# Patient Record
Sex: FEMALE | Race: BLACK OR AFRICAN AMERICAN | Employment: UNEMPLOYED | URBAN - METROPOLITAN AREA
[De-identification: names, ages, dates, MRNs, and addresses within clinical notes are randomized per-mention and may not be internally consistent; named-entity substitution may affect disease eponyms.]

---

## 2017-05-17 ENCOUNTER — APPOINTMENT (OUTPATIENT)
Dept: GENERAL RADIOLOGY | Age: 52
End: 2017-05-17
Payer: MEDICARE

## 2017-05-17 ENCOUNTER — HOSPITAL ENCOUNTER (EMERGENCY)
Age: 52
Discharge: HOME | End: 2017-05-17
Attending: EMERGENCY MEDICINE
Payer: MEDICARE

## 2017-05-17 VITALS
TEMPERATURE: 98.3 F | BODY MASS INDEX: 33.86 KG/M2 | OXYGEN SATURATION: 100 % | HEIGHT: 62 IN | DIASTOLIC BLOOD PRESSURE: 94 MMHG | RESPIRATION RATE: 17 BRPM | HEART RATE: 100 BPM | SYSTOLIC BLOOD PRESSURE: 154 MMHG | WEIGHT: 184 LBS

## 2017-05-17 DIAGNOSIS — S62.366A CLOSED NONDISPLACED FRACTURE OF NECK OF FIFTH METACARPAL BONE OF RIGHT HAND, INITIAL ENCOUNTER: Primary | ICD-10-CM

## 2017-05-17 PROCEDURE — 99284 EMERGENCY DEPT VISIT MOD MDM: CPT

## 2017-05-17 PROCEDURE — 29125 APPL SHORT ARM SPLINT STATIC: CPT

## 2017-05-17 PROCEDURE — 6370000000 HC RX 637 (ALT 250 FOR IP): Performed by: EMERGENCY MEDICINE

## 2017-05-17 PROCEDURE — 96372 THER/PROPH/DIAG INJ SC/IM: CPT

## 2017-05-17 PROCEDURE — 73130 X-RAY EXAM OF HAND: CPT

## 2017-05-17 PROCEDURE — 6360000002 HC RX W HCPCS: Performed by: EMERGENCY MEDICINE

## 2017-05-17 RX ORDER — ACETAMINOPHEN 325 MG/1
650 TABLET ORAL ONCE
Status: DISCONTINUED | OUTPATIENT
Start: 2017-05-17 | End: 2017-05-17

## 2017-05-17 RX ORDER — ACETAMINOPHEN 500 MG
500 TABLET ORAL EVERY 6 HOURS PRN
Qty: 120 TABLET | Refills: 3 | Status: SHIPPED | OUTPATIENT
Start: 2017-05-17 | End: 2017-12-02 | Stop reason: ALTCHOICE

## 2017-05-17 RX ORDER — FENTANYL CITRATE 50 UG/ML
50 INJECTION, SOLUTION INTRAMUSCULAR; INTRAVENOUS ONCE
Status: COMPLETED | OUTPATIENT
Start: 2017-05-17 | End: 2017-05-17

## 2017-05-17 RX ORDER — TRAMADOL HYDROCHLORIDE 50 MG/1
50 TABLET ORAL EVERY 8 HOURS PRN
Qty: 20 TABLET | Refills: 0 | Status: SHIPPED | OUTPATIENT
Start: 2017-05-17 | End: 2017-05-22 | Stop reason: ALTCHOICE

## 2017-05-17 RX ORDER — TRAMADOL HYDROCHLORIDE 50 MG/1
50 TABLET ORAL ONCE
Status: COMPLETED | OUTPATIENT
Start: 2017-05-17 | End: 2017-05-17

## 2017-05-17 RX ADMIN — TRAMADOL HYDROCHLORIDE 50 MG: 50 TABLET, FILM COATED ORAL at 15:41

## 2017-05-17 RX ADMIN — FENTANYL CITRATE 50 MCG: 50 INJECTION, SOLUTION INTRAMUSCULAR; INTRAVENOUS at 16:42

## 2017-05-17 ASSESSMENT — ENCOUNTER SYMPTOMS
BLOOD IN STOOL: 0
CHEST TIGHTNESS: 0
EYE DISCHARGE: 0
CONSTIPATION: 0
EYE PAIN: 0
ABDOMINAL DISTENTION: 0
ABDOMINAL PAIN: 0
RHINORRHEA: 0
STRIDOR: 0
SORE THROAT: 0
DIARRHEA: 0
CHOKING: 0
VOICE CHANGE: 0
BACK PAIN: 0
COUGH: 0
NAUSEA: 0
TROUBLE SWALLOWING: 0
PHOTOPHOBIA: 0
VOMITING: 0
APNEA: 0
SHORTNESS OF BREATH: 0
WHEEZING: 0
COLOR CHANGE: 0
EYE REDNESS: 0

## 2017-05-17 ASSESSMENT — PAIN DESCRIPTION - ORIENTATION
ORIENTATION: RIGHT
ORIENTATION: RIGHT

## 2017-05-17 ASSESSMENT — PAIN SCALES - GENERAL
PAINLEVEL_OUTOF10: 8
PAINLEVEL_OUTOF10: 7
PAINLEVEL_OUTOF10: 8
PAINLEVEL_OUTOF10: 10

## 2017-05-17 ASSESSMENT — PAIN DESCRIPTION - PAIN TYPE
TYPE: ACUTE PAIN
TYPE: ACUTE PAIN

## 2017-05-17 ASSESSMENT — PAIN DESCRIPTION - LOCATION
LOCATION: HAND
LOCATION: HAND

## 2017-05-22 ENCOUNTER — OFFICE VISIT (OUTPATIENT)
Dept: ORTHOPEDIC SURGERY | Age: 52
End: 2017-05-22
Payer: MEDICARE

## 2017-05-22 VITALS — HEIGHT: 62 IN | WEIGHT: 188.49 LBS | BODY MASS INDEX: 34.69 KG/M2

## 2017-05-22 DIAGNOSIS — S62.339A FRACTURE OF METACARPAL NECK OF RIGHT HAND, CLOSED, INITIAL ENCOUNTER: Primary | ICD-10-CM

## 2017-05-22 PROCEDURE — 3017F COLORECTAL CA SCREEN DOC REV: CPT | Performed by: ORTHOPAEDIC SURGERY

## 2017-05-22 PROCEDURE — G8417 CALC BMI ABV UP PARAM F/U: HCPCS | Performed by: ORTHOPAEDIC SURGERY

## 2017-05-22 PROCEDURE — 99203 OFFICE O/P NEW LOW 30 MIN: CPT | Performed by: ORTHOPAEDIC SURGERY

## 2017-05-22 PROCEDURE — 1036F TOBACCO NON-USER: CPT | Performed by: ORTHOPAEDIC SURGERY

## 2017-05-22 PROCEDURE — 3014F SCREEN MAMMO DOC REV: CPT | Performed by: ORTHOPAEDIC SURGERY

## 2017-05-22 PROCEDURE — G8427 DOCREV CUR MEDS BY ELIG CLIN: HCPCS | Performed by: ORTHOPAEDIC SURGERY

## 2017-05-22 RX ORDER — HYDROCODONE BITARTRATE AND ACETAMINOPHEN 5; 325 MG/1; MG/1
1 TABLET ORAL EVERY 8 HOURS PRN
Qty: 28 TABLET | Refills: 0 | Status: SHIPPED | OUTPATIENT
Start: 2017-05-22 | End: 2017-08-02 | Stop reason: ALTCHOICE

## 2017-05-22 ASSESSMENT — ENCOUNTER SYMPTOMS
ABDOMINAL PAIN: 0
WHEEZING: 0
SHORTNESS OF BREATH: 0
ABDOMINAL DISTENTION: 0

## 2017-05-31 ENCOUNTER — OFFICE VISIT (OUTPATIENT)
Dept: ORTHOPEDIC SURGERY | Age: 52
End: 2017-05-31
Payer: MEDICARE

## 2017-05-31 VITALS — WEIGHT: 182 LBS | BODY MASS INDEX: 33.49 KG/M2 | HEIGHT: 62 IN

## 2017-05-31 DIAGNOSIS — S62.339D: Primary | ICD-10-CM

## 2017-05-31 PROCEDURE — G8427 DOCREV CUR MEDS BY ELIG CLIN: HCPCS | Performed by: STUDENT IN AN ORGANIZED HEALTH CARE EDUCATION/TRAINING PROGRAM

## 2017-05-31 PROCEDURE — 3017F COLORECTAL CA SCREEN DOC REV: CPT | Performed by: STUDENT IN AN ORGANIZED HEALTH CARE EDUCATION/TRAINING PROGRAM

## 2017-05-31 PROCEDURE — 99213 OFFICE O/P EST LOW 20 MIN: CPT | Performed by: STUDENT IN AN ORGANIZED HEALTH CARE EDUCATION/TRAINING PROGRAM

## 2017-05-31 PROCEDURE — 1036F TOBACCO NON-USER: CPT | Performed by: STUDENT IN AN ORGANIZED HEALTH CARE EDUCATION/TRAINING PROGRAM

## 2017-05-31 PROCEDURE — G8417 CALC BMI ABV UP PARAM F/U: HCPCS | Performed by: STUDENT IN AN ORGANIZED HEALTH CARE EDUCATION/TRAINING PROGRAM

## 2017-05-31 PROCEDURE — 3014F SCREEN MAMMO DOC REV: CPT | Performed by: STUDENT IN AN ORGANIZED HEALTH CARE EDUCATION/TRAINING PROGRAM

## 2017-05-31 ASSESSMENT — ENCOUNTER SYMPTOMS
DIARRHEA: 0
NAUSEA: 0
CHOKING: 0
ABDOMINAL PAIN: 0
CHEST TIGHTNESS: 0
VOMITING: 0
WHEEZING: 0
EYE DISCHARGE: 0

## 2017-08-02 ENCOUNTER — APPOINTMENT (OUTPATIENT)
Dept: GENERAL RADIOLOGY | Age: 52
End: 2017-08-02
Payer: COMMERCIAL

## 2017-08-02 ENCOUNTER — HOSPITAL ENCOUNTER (EMERGENCY)
Age: 52
Discharge: HOME | End: 2017-08-02
Attending: EMERGENCY MEDICINE
Payer: COMMERCIAL

## 2017-08-02 VITALS
TEMPERATURE: 98.5 F | OXYGEN SATURATION: 100 % | RESPIRATION RATE: 16 BRPM | SYSTOLIC BLOOD PRESSURE: 136 MMHG | HEART RATE: 92 BPM | DIASTOLIC BLOOD PRESSURE: 93 MMHG

## 2017-08-02 DIAGNOSIS — S92.402A CLOSED NON-PHYSEAL FRACTURE OF PHALANX OF LEFT GREAT TOE, UNSPECIFIED PHALANX, INITIAL ENCOUNTER: Primary | ICD-10-CM

## 2017-08-02 PROCEDURE — 73630 X-RAY EXAM OF FOOT: CPT

## 2017-08-02 PROCEDURE — 6370000000 HC RX 637 (ALT 250 FOR IP): Performed by: PHYSICIAN ASSISTANT

## 2017-08-02 PROCEDURE — 99283 EMERGENCY DEPT VISIT LOW MDM: CPT

## 2017-08-02 RX ORDER — HYDROCODONE BITARTRATE AND ACETAMINOPHEN 5; 325 MG/1; MG/1
1 TABLET ORAL EVERY 8 HOURS PRN
Qty: 15 TABLET | Refills: 0 | Status: SHIPPED | OUTPATIENT
Start: 2017-08-02 | End: 2017-12-02 | Stop reason: ALTCHOICE

## 2017-08-02 RX ORDER — HYDROCODONE BITARTRATE AND ACETAMINOPHEN 5; 325 MG/1; MG/1
2 TABLET ORAL ONCE
Status: COMPLETED | OUTPATIENT
Start: 2017-08-02 | End: 2017-08-02

## 2017-08-02 RX ADMIN — HYDROCODONE BITARTRATE AND ACETAMINOPHEN 2 TABLET: 5; 325 TABLET ORAL at 14:51

## 2017-08-02 ASSESSMENT — ENCOUNTER SYMPTOMS
RESPIRATORY NEGATIVE: 1
EYES NEGATIVE: 1
ALLERGIC/IMMUNOLOGIC NEGATIVE: 1
GASTROINTESTINAL NEGATIVE: 1

## 2017-08-02 ASSESSMENT — PAIN DESCRIPTION - LOCATION: LOCATION: TOE (COMMENT WHICH ONE)

## 2017-08-02 ASSESSMENT — PAIN DESCRIPTION - PAIN TYPE: TYPE: ACUTE PAIN

## 2017-08-02 ASSESSMENT — PAIN SCALES - GENERAL
PAINLEVEL_OUTOF10: 10
PAINLEVEL_OUTOF10: 10

## 2017-08-02 ASSESSMENT — PAIN DESCRIPTION - FREQUENCY: FREQUENCY: CONTINUOUS

## 2017-08-02 ASSESSMENT — PAIN DESCRIPTION - ORIENTATION: ORIENTATION: LEFT

## 2017-08-02 ASSESSMENT — PAIN DESCRIPTION - DESCRIPTORS: DESCRIPTORS: ACHING

## 2017-12-02 ENCOUNTER — APPOINTMENT (OUTPATIENT)
Dept: GENERAL RADIOLOGY | Age: 52
End: 2017-12-02
Payer: COMMERCIAL

## 2017-12-02 ENCOUNTER — HOSPITAL ENCOUNTER (EMERGENCY)
Age: 52
Discharge: HOME | End: 2017-12-02
Attending: EMERGENCY MEDICINE
Payer: COMMERCIAL

## 2017-12-02 VITALS
OXYGEN SATURATION: 98 % | BODY MASS INDEX: 35.7 KG/M2 | WEIGHT: 194 LBS | TEMPERATURE: 97.3 F | HEIGHT: 62 IN | DIASTOLIC BLOOD PRESSURE: 84 MMHG | SYSTOLIC BLOOD PRESSURE: 137 MMHG | HEART RATE: 92 BPM | RESPIRATION RATE: 18 BRPM

## 2017-12-02 DIAGNOSIS — M79.671 RIGHT FOOT PAIN: ICD-10-CM

## 2017-12-02 DIAGNOSIS — K08.89 PAIN, DENTAL: Primary | ICD-10-CM

## 2017-12-02 DIAGNOSIS — R03.0 ELEVATED BLOOD PRESSURE READING: ICD-10-CM

## 2017-12-02 DIAGNOSIS — R82.998 FOAMY URINE: ICD-10-CM

## 2017-12-02 LAB
BILIRUBIN URINE: NEGATIVE
COLOR: YELLOW
COMMENT UA: NORMAL
GLUCOSE URINE: NEGATIVE
KETONES, URINE: NEGATIVE
LEUKOCYTE ESTERASE, URINE: NEGATIVE
NITRITE, URINE: NEGATIVE
PH UA: 7 (ref 5–8)
PROTEIN UA: NEGATIVE
SPECIFIC GRAVITY UA: 1.01 (ref 1–1.03)
TURBIDITY: CLEAR
URINE HGB: NEGATIVE
UROBILINOGEN, URINE: NORMAL

## 2017-12-02 PROCEDURE — 73630 X-RAY EXAM OF FOOT: CPT

## 2017-12-02 PROCEDURE — 81003 URINALYSIS AUTO W/O SCOPE: CPT

## 2017-12-02 PROCEDURE — 6370000000 HC RX 637 (ALT 250 FOR IP): Performed by: PHYSICIAN ASSISTANT

## 2017-12-02 PROCEDURE — 99283 EMERGENCY DEPT VISIT LOW MDM: CPT

## 2017-12-02 RX ORDER — PENICILLIN V POTASSIUM 250 MG/1
500 TABLET ORAL ONCE
Status: COMPLETED | OUTPATIENT
Start: 2017-12-02 | End: 2017-12-02

## 2017-12-02 RX ORDER — ACETAMINOPHEN 325 MG/1
650 TABLET ORAL EVERY 6 HOURS PRN
Qty: 30 TABLET | Refills: 0 | Status: SHIPPED | OUTPATIENT
Start: 2017-12-02 | End: 2018-02-07 | Stop reason: SDUPTHER

## 2017-12-02 RX ORDER — ACETAMINOPHEN 325 MG/1
650 TABLET ORAL ONCE
Status: COMPLETED | OUTPATIENT
Start: 2017-12-02 | End: 2017-12-02

## 2017-12-02 RX ORDER — PENICILLIN V POTASSIUM 500 MG/1
500 TABLET ORAL 4 TIMES DAILY
Qty: 28 TABLET | Refills: 0 | Status: SHIPPED | OUTPATIENT
Start: 2017-12-02 | End: 2017-12-09

## 2017-12-02 RX ADMIN — PENICILLIN V POTASSIUM 500 MG: 250 TABLET ORAL at 15:35

## 2017-12-02 RX ADMIN — ACETAMINOPHEN 650 MG: 325 TABLET ORAL at 15:35

## 2017-12-02 ASSESSMENT — PAIN DESCRIPTION - PAIN TYPE: TYPE: ACUTE PAIN

## 2017-12-02 ASSESSMENT — PAIN SCALES - GENERAL: PAINLEVEL_OUTOF10: 10

## 2017-12-02 ASSESSMENT — ENCOUNTER SYMPTOMS
BACK PAIN: 0
EYE ITCHING: 0
SORE THROAT: 0
COUGH: 0
EYE DISCHARGE: 0
VOMITING: 0
RHINORRHEA: 0
COLOR CHANGE: 0
NAUSEA: 0
EYE PAIN: 0
WHEEZING: 0

## 2017-12-02 ASSESSMENT — PAIN DESCRIPTION - DESCRIPTORS
DESCRIPTORS: ACHING;CONSTANT;THROBBING
DESCRIPTORS: ACHING

## 2017-12-02 ASSESSMENT — PAIN DESCRIPTION - LOCATION: LOCATION: MOUTH

## 2017-12-02 ASSESSMENT — PAIN DESCRIPTION - ONSET: ONSET: PROGRESSIVE

## 2017-12-02 ASSESSMENT — PAIN DESCRIPTION - PROGRESSION: CLINICAL_PROGRESSION: GRADUALLY WORSENING

## 2017-12-02 ASSESSMENT — PAIN DESCRIPTION - ORIENTATION: ORIENTATION: RIGHT
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
## 2018-02-07 ENCOUNTER — HOSPITAL ENCOUNTER (EMERGENCY)
Age: 53
Discharge: HOME | End: 2018-02-07
Attending: EMERGENCY MEDICINE
Payer: COMMERCIAL

## 2018-02-07 ENCOUNTER — APPOINTMENT (OUTPATIENT)
Dept: GENERAL RADIOLOGY | Age: 53
End: 2018-02-07
Payer: COMMERCIAL

## 2018-02-07 VITALS
TEMPERATURE: 97.3 F | WEIGHT: 184 LBS | SYSTOLIC BLOOD PRESSURE: 140 MMHG | HEIGHT: 62 IN | DIASTOLIC BLOOD PRESSURE: 105 MMHG | OXYGEN SATURATION: 100 % | BODY MASS INDEX: 33.86 KG/M2 | RESPIRATION RATE: 16 BRPM | HEART RATE: 85 BPM

## 2018-02-07 DIAGNOSIS — M25.521 RIGHT ELBOW PAIN: Primary | ICD-10-CM

## 2018-02-07 PROCEDURE — 6370000000 HC RX 637 (ALT 250 FOR IP): Performed by: EMERGENCY MEDICINE

## 2018-02-07 PROCEDURE — 73090 X-RAY EXAM OF FOREARM: CPT

## 2018-02-07 PROCEDURE — 73080 X-RAY EXAM OF ELBOW: CPT

## 2018-02-07 PROCEDURE — 73060 X-RAY EXAM OF HUMERUS: CPT

## 2018-02-07 PROCEDURE — G0383 LEV 4 HOSP TYPE B ED VISIT: HCPCS

## 2018-02-07 RX ORDER — TRAMADOL HYDROCHLORIDE 50 MG/1
50 TABLET ORAL ONCE
Status: COMPLETED | OUTPATIENT
Start: 2018-02-07 | End: 2018-02-07

## 2018-02-07 RX ORDER — ACETAMINOPHEN 325 MG/1
650 TABLET ORAL ONCE
Status: COMPLETED | OUTPATIENT
Start: 2018-02-07 | End: 2018-02-07

## 2018-02-07 RX ORDER — ACETAMINOPHEN 325 MG/1
650 TABLET ORAL EVERY 6 HOURS PRN
Qty: 50 TABLET | Refills: 0 | Status: SHIPPED | OUTPATIENT
Start: 2018-02-07

## 2018-02-07 RX ADMIN — TRAMADOL HYDROCHLORIDE 50 MG: 50 TABLET, FILM COATED ORAL at 15:37

## 2018-02-07 RX ADMIN — ACETAMINOPHEN 650 MG: 325 TABLET ORAL at 15:37

## 2018-02-07 ASSESSMENT — PAIN DESCRIPTION - LOCATION: LOCATION: ELBOW

## 2018-02-07 ASSESSMENT — ENCOUNTER SYMPTOMS
SHORTNESS OF BREATH: 0
COUGH: 0
VOMITING: 0
BACK PAIN: 0
ABDOMINAL PAIN: 0
NAUSEA: 0

## 2018-02-07 ASSESSMENT — PAIN DESCRIPTION - ORIENTATION: ORIENTATION: RIGHT

## 2018-02-07 ASSESSMENT — PAIN SCALES - GENERAL
PAINLEVEL_OUTOF10: 6
PAINLEVEL_OUTOF10: 8
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
## 2018-02-07 NOTE — ED PROVIDER NOTES
[facility]     Emergency Department     Faculty Attestation    I performed a history and physical examination of the patient and discussed management with the resident. I reviewed the residents note and agree with the documented findings and plan of care. Any areas of disagreement are noted on the chart. I was personally present for the key portions of any procedures. I have documented in the chart those procedures where I was not present during the key portions. I have reviewed the emergency nurses triage note. I agree with the chief complaint, past medical history, past surgical history, allergies, medications, social and family history as documented unless otherwise noted below. Documentation of the HPI, Physical Exam and Medical Decision Making performed by medical students or scribes is based on my personal performance of the HPI, PE and MDM. For Physician Assistant/ Nurse Practitioner cases/documentation I have personally evaluated this patient and have completed at least one if not all key elements of the E/M (history, physical exam, and MDM). Additional findings are as noted. Vital signs:   Vitals:    02/07/18 1448   BP: (!) 144/105   Pulse: 94   Resp: 18   Temp: 96.1 °F (35.6 °C)   SpO2: 80     A 1year-old female presents after a fall. She not start her head. No loss conscious. She complains of right elbow pain. She is most tender over the radial head. She does have full range of motion of the elbow. Her hand is a little swollen as well. No stroller tenderness.   We'll obtain x-rays of the elbow, radius, ulna      Ember Mckeon M.D,  Attending Emergency  Physician            Ember Mckeon MD  02/07/18 1270

## 2023-02-18 ENCOUNTER — HOSPITAL ENCOUNTER (INPATIENT)
Age: 58
LOS: 3 days | Discharge: HOME HEALTH | DRG: 696 | End: 2023-02-21
Attending: EMERGENCY MEDICINE | Admitting: INTERNAL MEDICINE
Payer: COMMERCIAL

## 2023-02-18 ENCOUNTER — APPOINTMENT (OUTPATIENT)
Dept: CT IMAGING | Age: 58
DRG: 696 | End: 2023-02-18
Payer: COMMERCIAL

## 2023-02-18 DIAGNOSIS — E11.9 NEW ONSET TYPE 2 DIABETES MELLITUS (HCC): ICD-10-CM

## 2023-02-18 DIAGNOSIS — R33.9 URINARY RETENTION: Primary | ICD-10-CM

## 2023-02-18 PROBLEM — I10 HYPERTENSION: Status: ACTIVE | Noted: 2023-02-18

## 2023-02-18 PROBLEM — E87.1 HYPONATREMIA: Status: ACTIVE | Noted: 2023-02-18

## 2023-02-18 PROBLEM — N17.9 AKI (ACUTE KIDNEY INJURY) (HCC): Status: ACTIVE | Noted: 2023-02-18

## 2023-02-18 PROBLEM — F41.9 ANXIETY: Status: ACTIVE | Noted: 2023-02-18

## 2023-02-18 PROBLEM — R73.9 HYPERGLYCEMIA: Status: ACTIVE | Noted: 2023-02-18

## 2023-02-18 PROBLEM — R33.8 ACUTE URINARY RETENTION: Status: ACTIVE | Noted: 2023-02-18

## 2023-02-18 LAB
ABSOLUTE EOS #: <0.03 K/UL (ref 0–0.44)
ABSOLUTE IMMATURE GRANULOCYTE: 0.1 K/UL (ref 0–0.3)
ABSOLUTE LYMPH #: 1.73 K/UL (ref 1.1–3.7)
ABSOLUTE MONO #: 1.3 K/UL (ref 0.1–1.2)
ACETAMINOPHEN LEVEL: <5 UG/ML (ref 10–30)
AMPHETAMINE SCREEN URINE: NEGATIVE
ANION GAP SERPL CALCULATED.3IONS-SCNC: 12 MMOL/L (ref 9–17)
ANION GAP SERPL CALCULATED.3IONS-SCNC: 18 MMOL/L (ref 9–17)
BARBITURATE SCREEN URINE: NEGATIVE
BASOPHILS # BLD: 0 % (ref 0–2)
BASOPHILS ABSOLUTE: 0.04 K/UL (ref 0–0.2)
BENZODIAZEPINE SCREEN, URINE: POSITIVE
BETA-HYDROXYBUTYRATE: 0.77 MMOL/L (ref 0.02–0.27)
BILIRUBIN URINE: NEGATIVE
BUN SERPL-MCNC: 27 MG/DL (ref 6–20)
CALCIUM SERPL-MCNC: 9 MG/DL (ref 8.6–10.4)
CANNABINOID SCREEN URINE: NEGATIVE
CARBOXYHEMOGLOBIN: 1.1 % (ref 0–5)
CASTS UA: ABNORMAL /LPF (ref 0–2)
CASTS UA: ABNORMAL /LPF (ref 0–2)
CHLORIDE SERPL-SCNC: 102 MMOL/L (ref 98–107)
CHLORIDE SERPL-SCNC: 93 MMOL/L (ref 98–107)
CO2 SERPL-SCNC: 19 MMOL/L (ref 20–31)
CO2 SERPL-SCNC: 19 MMOL/L (ref 20–31)
COCAINE METABOLITE, URINE: NEGATIVE
COLOR: YELLOW
CREAT SERPL-MCNC: 1.6 MG/DL (ref 0.5–0.9)
EOSINOPHILS RELATIVE PERCENT: 0 % (ref 1–4)
EPITHELIAL CELLS UA: ABNORMAL /HPF (ref 0–5)
ETHANOL PERCENT: <0.01 %
ETHANOL: <10 MG/DL
FENTANYL URINE: NEGATIVE
FIO2: ABNORMAL
GFR SERPL CREATININE-BSD FRML MDRD: 37 ML/MIN/1.73M2
GLUCOSE BLD-MCNC: 291 MG/DL (ref 65–105)
GLUCOSE SERPL-MCNC: 383 MG/DL (ref 70–99)
GLUCOSE UR STRIP.AUTO-MCNC: ABNORMAL MG/DL
HCO3 VENOUS: 22.1 MMOL/L (ref 24–30)
HCT VFR BLD AUTO: 38.5 % (ref 36.3–47.1)
HGB BLD-MCNC: 12.6 G/DL (ref 11.9–15.1)
IMMATURE GRANULOCYTES: 1 %
KETONES UR STRIP.AUTO-MCNC: ABNORMAL MG/DL
LACTIC ACID, SEPSIS WHOLE BLOOD: 3.1 MMOL/L (ref 0.5–1.9)
LACTIC ACID, SEPSIS WHOLE BLOOD: 4 MMOL/L (ref 0.5–1.9)
LEUKOCYTE ESTERASE UR QL STRIP.AUTO: NEGATIVE
LYMPHOCYTES # BLD: 12 % (ref 24–43)
MAGNESIUM SERPL-MCNC: 1.9 MG/DL (ref 1.6–2.6)
MCH RBC QN AUTO: 27.9 PG (ref 25.2–33.5)
MCHC RBC AUTO-ENTMCNC: 32.7 G/DL (ref 28.4–34.8)
MCV RBC AUTO: 85.4 FL (ref 82.6–102.9)
METHADONE SCREEN, URINE: NEGATIVE
MONOCYTES # BLD: 9 % (ref 3–12)
MUCUS: ABNORMAL
NEGATIVE BASE EXCESS, VEN: 3.1 MMOL/L (ref 0–2)
NITRITE UR QL STRIP.AUTO: NEGATIVE
NRBC AUTOMATED: 0 PER 100 WBC
O2 SAT, VEN: 51.8 % (ref 60–85)
OPIATES, URINE: NEGATIVE
OSMOLALITY URINE: 506 MOSM/KG (ref 80–1300)
OXYCODONE SCREEN URINE: NEGATIVE
PATIENT TEMP: 37
PCO2, VEN: 42.4 MM HG (ref 39–55)
PDW BLD-RTO: 13.5 % (ref 11.8–14.4)
PH VENOUS: 7.34 (ref 7.32–7.42)
PHENCYCLIDINE, URINE: NEGATIVE
PLATELET # BLD AUTO: 390 K/UL (ref 138–453)
PMV BLD AUTO: 10.4 FL (ref 8.1–13.5)
PO2, VEN: 28.1 MM HG (ref 30–50)
POTASSIUM SERPL-SCNC: 4.2 MMOL/L (ref 3.7–5.3)
POTASSIUM SERPL-SCNC: 5.1 MMOL/L (ref 3.7–5.3)
PROT UR STRIP.AUTO-MCNC: 8 MG/DL (ref 5–8)
PROT UR STRIP.AUTO-MCNC: ABNORMAL MG/DL
RBC # BLD: 4.51 M/UL (ref 3.95–5.11)
RBC CLUMPS #/AREA URNS AUTO: ABNORMAL /HPF (ref 0–2)
SALICYLATE LEVEL: <1 MG/DL (ref 3–10)
SEG NEUTROPHILS: 79 % (ref 36–65)
SEGMENTED NEUTROPHILS ABSOLUTE COUNT: 11.58 K/UL (ref 1.5–8.1)
SERUM OSMOLALITY: 304 MOSM/KG (ref 275–295)
SODIUM SERPL-SCNC: 130 MMOL/L (ref 135–144)
SODIUM SERPL-SCNC: 133 MMOL/L (ref 135–144)
SODIUM,UR: 83 MMOL/L
SPECIFIC GRAVITY UA: 1.02 (ref 1–1.03)
TEST INFORMATION: ABNORMAL
TOXIC TRICYCLIC SC,BLOOD: NEGATIVE
TSH SERPL-ACNC: 0.71 UIU/ML (ref 0.3–5)
TURBIDITY: CLEAR
URINE HGB: ABNORMAL
UROBILINOGEN, URINE: NORMAL
WBC # BLD AUTO: 14.8 K/UL (ref 3.5–11.3)
WBC UA: ABNORMAL /HPF (ref 0–5)

## 2023-02-18 PROCEDURE — 83735 ASSAY OF MAGNESIUM: CPT

## 2023-02-18 PROCEDURE — 70450 CT HEAD/BRAIN W/O DYE: CPT

## 2023-02-18 PROCEDURE — 82947 ASSAY GLUCOSE BLOOD QUANT: CPT

## 2023-02-18 PROCEDURE — 6370000000 HC RX 637 (ALT 250 FOR IP): Performed by: STUDENT IN AN ORGANIZED HEALTH CARE EDUCATION/TRAINING PROGRAM

## 2023-02-18 PROCEDURE — 83930 ASSAY OF BLOOD OSMOLALITY: CPT

## 2023-02-18 PROCEDURE — 87086 URINE CULTURE/COLONY COUNT: CPT

## 2023-02-18 PROCEDURE — 3209999900 CT LUMBAR SPINE TRAUMA RECONSTRUCTION

## 2023-02-18 PROCEDURE — 80143 DRUG ASSAY ACETAMINOPHEN: CPT

## 2023-02-18 PROCEDURE — 87040 BLOOD CULTURE FOR BACTERIA: CPT

## 2023-02-18 PROCEDURE — 2580000003 HC RX 258: Performed by: INTERNAL MEDICINE

## 2023-02-18 PROCEDURE — 80307 DRUG TEST PRSMV CHEM ANLYZR: CPT

## 2023-02-18 PROCEDURE — 99223 1ST HOSP IP/OBS HIGH 75: CPT | Performed by: INTERNAL MEDICINE

## 2023-02-18 PROCEDURE — 2580000003 HC RX 258: Performed by: STUDENT IN AN ORGANIZED HEALTH CARE EDUCATION/TRAINING PROGRAM

## 2023-02-18 PROCEDURE — 72125 CT NECK SPINE W/O DYE: CPT

## 2023-02-18 PROCEDURE — 83036 HEMOGLOBIN GLYCOSYLATED A1C: CPT

## 2023-02-18 PROCEDURE — 84443 ASSAY THYROID STIM HORMONE: CPT

## 2023-02-18 PROCEDURE — 99285 EMERGENCY DEPT VISIT HI MDM: CPT

## 2023-02-18 PROCEDURE — 80179 DRUG ASSAY SALICYLATE: CPT

## 2023-02-18 PROCEDURE — 1200000000 HC SEMI PRIVATE

## 2023-02-18 PROCEDURE — 80048 BASIC METABOLIC PNL TOTAL CA: CPT

## 2023-02-18 PROCEDURE — 85025 COMPLETE CBC W/AUTO DIFF WBC: CPT

## 2023-02-18 PROCEDURE — 81001 URINALYSIS AUTO W/SCOPE: CPT

## 2023-02-18 PROCEDURE — 6370000000 HC RX 637 (ALT 250 FOR IP): Performed by: INTERNAL MEDICINE

## 2023-02-18 PROCEDURE — G0480 DRUG TEST DEF 1-7 CLASSES: HCPCS

## 2023-02-18 PROCEDURE — 36415 COLL VENOUS BLD VENIPUNCTURE: CPT

## 2023-02-18 PROCEDURE — 83935 ASSAY OF URINE OSMOLALITY: CPT

## 2023-02-18 PROCEDURE — 71250 CT THORAX DX C-: CPT

## 2023-02-18 PROCEDURE — 80051 ELECTROLYTE PANEL: CPT

## 2023-02-18 PROCEDURE — 6360000002 HC RX W HCPCS: Performed by: INTERNAL MEDICINE

## 2023-02-18 PROCEDURE — 82805 BLOOD GASES W/O2 SATURATION: CPT

## 2023-02-18 PROCEDURE — 82010 KETONE BODYS QUAN: CPT

## 2023-02-18 PROCEDURE — 84300 ASSAY OF URINE SODIUM: CPT

## 2023-02-18 PROCEDURE — 3209999900 CT THORACIC SPINE TRAUMA RECONSTRUCTION

## 2023-02-18 PROCEDURE — 83605 ASSAY OF LACTIC ACID: CPT

## 2023-02-18 RX ORDER — DEXTROSE MONOHYDRATE 100 MG/ML
INJECTION, SOLUTION INTRAVENOUS CONTINUOUS PRN
Status: DISCONTINUED | OUTPATIENT
Start: 2023-02-18 | End: 2023-02-21 | Stop reason: HOSPADM

## 2023-02-18 RX ORDER — GABAPENTIN 100 MG/1
100 CAPSULE ORAL 2 TIMES DAILY
Status: DISCONTINUED | OUTPATIENT
Start: 2023-02-18 | End: 2023-02-21 | Stop reason: HOSPADM

## 2023-02-18 RX ORDER — PROPRANOLOL HYDROCHLORIDE 10 MG/1
10 TABLET ORAL 2 TIMES DAILY
Status: DISCONTINUED | OUTPATIENT
Start: 2023-02-18 | End: 2023-02-21 | Stop reason: HOSPADM

## 2023-02-18 RX ORDER — POTASSIUM CHLORIDE 7.45 MG/ML
10 INJECTION INTRAVENOUS PRN
Status: DISCONTINUED | OUTPATIENT
Start: 2023-02-18 | End: 2023-02-21 | Stop reason: HOSPADM

## 2023-02-18 RX ORDER — BISACODYL 10 MG
10 SUPPOSITORY, RECTAL RECTAL DAILY PRN
Status: DISCONTINUED | OUTPATIENT
Start: 2023-02-18 | End: 2023-02-21 | Stop reason: HOSPADM

## 2023-02-18 RX ORDER — ALPRAZOLAM 1 MG/1
1 TABLET ORAL DAILY
Status: DISCONTINUED | OUTPATIENT
Start: 2023-02-18 | End: 2023-02-21 | Stop reason: HOSPADM

## 2023-02-18 RX ORDER — INSULIN LISPRO 100 [IU]/ML
0-4 INJECTION, SOLUTION INTRAVENOUS; SUBCUTANEOUS NIGHTLY
Status: DISCONTINUED | OUTPATIENT
Start: 2023-02-18 | End: 2023-02-21 | Stop reason: HOSPADM

## 2023-02-18 RX ORDER — AMLODIPINE BESYLATE 5 MG/1
5 TABLET ORAL DAILY
Status: DISCONTINUED | OUTPATIENT
Start: 2023-02-18 | End: 2023-02-20

## 2023-02-18 RX ORDER — ACETAMINOPHEN 325 MG/1
650 TABLET ORAL EVERY 6 HOURS PRN
Status: DISCONTINUED | OUTPATIENT
Start: 2023-02-18 | End: 2023-02-21 | Stop reason: HOSPADM

## 2023-02-18 RX ORDER — SODIUM CHLORIDE 9 MG/ML
INJECTION, SOLUTION INTRAVENOUS CONTINUOUS
Status: DISCONTINUED | OUTPATIENT
Start: 2023-02-18 | End: 2023-02-21

## 2023-02-18 RX ORDER — ACETAMINOPHEN 500 MG
1000 TABLET ORAL ONCE
Status: COMPLETED | OUTPATIENT
Start: 2023-02-18 | End: 2023-02-18

## 2023-02-18 RX ORDER — ONDANSETRON 4 MG/1
4 TABLET, ORALLY DISINTEGRATING ORAL EVERY 8 HOURS PRN
Status: DISCONTINUED | OUTPATIENT
Start: 2023-02-18 | End: 2023-02-21 | Stop reason: HOSPADM

## 2023-02-18 RX ORDER — SODIUM CHLORIDE 0.9 % (FLUSH) 0.9 %
5-40 SYRINGE (ML) INJECTION PRN
Status: DISCONTINUED | OUTPATIENT
Start: 2023-02-18 | End: 2023-02-21 | Stop reason: HOSPADM

## 2023-02-18 RX ORDER — ACETAMINOPHEN 650 MG/1
650 SUPPOSITORY RECTAL EVERY 6 HOURS PRN
Status: DISCONTINUED | OUTPATIENT
Start: 2023-02-18 | End: 2023-02-21 | Stop reason: HOSPADM

## 2023-02-18 RX ORDER — HYDROCODONE BITARTRATE AND ACETAMINOPHEN 5; 325 MG/1; MG/1
1 TABLET ORAL EVERY 6 HOURS PRN
Status: DISCONTINUED | OUTPATIENT
Start: 2023-02-18 | End: 2023-02-21 | Stop reason: HOSPADM

## 2023-02-18 RX ORDER — POTASSIUM CHLORIDE 20 MEQ/1
40 TABLET, EXTENDED RELEASE ORAL PRN
Status: DISCONTINUED | OUTPATIENT
Start: 2023-02-18 | End: 2023-02-21 | Stop reason: HOSPADM

## 2023-02-18 RX ORDER — 0.9 % SODIUM CHLORIDE 0.9 %
1000 INTRAVENOUS SOLUTION INTRAVENOUS ONCE
Status: COMPLETED | OUTPATIENT
Start: 2023-02-18 | End: 2023-02-18

## 2023-02-18 RX ORDER — TAMSULOSIN HYDROCHLORIDE 0.4 MG/1
0.4 CAPSULE ORAL DAILY
Status: DISCONTINUED | OUTPATIENT
Start: 2023-02-18 | End: 2023-02-21 | Stop reason: HOSPADM

## 2023-02-18 RX ORDER — INSULIN LISPRO 100 [IU]/ML
0-4 INJECTION, SOLUTION INTRAVENOUS; SUBCUTANEOUS
Status: DISCONTINUED | OUTPATIENT
Start: 2023-02-18 | End: 2023-02-21 | Stop reason: HOSPADM

## 2023-02-18 RX ORDER — SODIUM CHLORIDE 9 MG/ML
INJECTION, SOLUTION INTRAVENOUS PRN
Status: DISCONTINUED | OUTPATIENT
Start: 2023-02-18 | End: 2023-02-21 | Stop reason: HOSPADM

## 2023-02-18 RX ORDER — ONDANSETRON 2 MG/ML
4 INJECTION INTRAMUSCULAR; INTRAVENOUS EVERY 6 HOURS PRN
Status: DISCONTINUED | OUTPATIENT
Start: 2023-02-18 | End: 2023-02-21 | Stop reason: HOSPADM

## 2023-02-18 RX ORDER — ENOXAPARIN SODIUM 100 MG/ML
40 INJECTION SUBCUTANEOUS DAILY
Status: DISCONTINUED | OUTPATIENT
Start: 2023-02-18 | End: 2023-02-21 | Stop reason: HOSPADM

## 2023-02-18 RX ORDER — PROPRANOLOL HYDROCHLORIDE 10 MG/1
10 TABLET ORAL ONCE
Status: COMPLETED | OUTPATIENT
Start: 2023-02-18 | End: 2023-02-18

## 2023-02-18 RX ORDER — POLYETHYLENE GLYCOL 3350 17 G/17G
17 POWDER, FOR SOLUTION ORAL DAILY PRN
Status: DISCONTINUED | OUTPATIENT
Start: 2023-02-18 | End: 2023-02-21 | Stop reason: HOSPADM

## 2023-02-18 RX ORDER — SODIUM CHLORIDE 0.9 % (FLUSH) 0.9 %
5-40 SYRINGE (ML) INJECTION EVERY 12 HOURS SCHEDULED
Status: DISCONTINUED | OUTPATIENT
Start: 2023-02-18 | End: 2023-02-21 | Stop reason: HOSPADM

## 2023-02-18 RX ADMIN — ACETAMINOPHEN 1000 MG: 500 TABLET ORAL at 17:34

## 2023-02-18 RX ADMIN — SODIUM CHLORIDE: 9 INJECTION, SOLUTION INTRAVENOUS at 20:22

## 2023-02-18 RX ADMIN — GABAPENTIN 100 MG: 100 CAPSULE ORAL at 20:35

## 2023-02-18 RX ADMIN — PROPRANOLOL HYDROCHLORIDE 10 MG: 10 TABLET ORAL at 20:35

## 2023-02-18 RX ADMIN — ENOXAPARIN SODIUM 40 MG: 100 INJECTION SUBCUTANEOUS at 20:19

## 2023-02-18 RX ADMIN — INSULIN LISPRO 2 UNITS: 100 INJECTION, SOLUTION INTRAVENOUS; SUBCUTANEOUS at 20:32

## 2023-02-18 RX ADMIN — TAMSULOSIN HYDROCHLORIDE 0.4 MG: 0.4 CAPSULE ORAL at 20:37

## 2023-02-18 RX ADMIN — ALPRAZOLAM 1 MG: 1 TABLET ORAL at 20:17

## 2023-02-18 RX ADMIN — LURASIDONE HYDROCHLORIDE 80 MG: 40 TABLET, FILM COATED ORAL at 20:35

## 2023-02-18 RX ADMIN — SODIUM CHLORIDE, PRESERVATIVE FREE 10 ML: 5 INJECTION INTRAVENOUS at 20:36

## 2023-02-18 RX ADMIN — HYDROCODONE BITARTRATE AND ACETAMINOPHEN 1 TABLET: 5; 325 TABLET ORAL at 20:17

## 2023-02-18 RX ADMIN — SODIUM CHLORIDE 1000 ML: 9 INJECTION, SOLUTION INTRAVENOUS at 15:44

## 2023-02-18 RX ADMIN — PROPRANOLOL HYDROCHLORIDE 10 MG: 10 TABLET ORAL at 18:25

## 2023-02-18 RX ADMIN — AMLODIPINE BESYLATE 5 MG: 10 TABLET ORAL at 20:17

## 2023-02-18 ASSESSMENT — PAIN SCALES - GENERAL
PAINLEVEL_OUTOF10: 7
PAINLEVEL_OUTOF10: 9
PAINLEVEL_OUTOF10: 9

## 2023-02-18 ASSESSMENT — PAIN DESCRIPTION - DESCRIPTORS
DESCRIPTORS: PRESSURE
DESCRIPTORS: THROBBING

## 2023-02-18 ASSESSMENT — PAIN - FUNCTIONAL ASSESSMENT
PAIN_FUNCTIONAL_ASSESSMENT: ACTIVITIES ARE NOT PREVENTED
PAIN_FUNCTIONAL_ASSESSMENT: 0-10

## 2023-02-18 ASSESSMENT — PAIN DESCRIPTION - LOCATION
LOCATION: PELVIS
LOCATION: BACK

## 2023-02-18 ASSESSMENT — ENCOUNTER SYMPTOMS
ABDOMINAL PAIN: 1
ABDOMINAL DISTENTION: 1
BACK PAIN: 0

## 2023-02-18 NOTE — ED PROVIDER NOTES
[facility] ED  Emergency Department Encounter  Emergency Medicine Resident     Pt Екатерина Curling Dena Antu  MRN: [id number]  [place] 1965  Date of evaluation: 23  PCP:  Jeffrey Machado MD  Note Started: 12:40 PM EST      CHIEF COMPLAINT       Chief Complaint   Patient presents with    Urinary Retention     Starting last night        HISTORY OF PRESENT ILLNESS  (Location/Symptom, Timing/Onset, Context/Setting, Quality, Duration, Modifying Factors, Severity.)      Greg Wright is a 61 y.o. female who presents with severe bladder pain. Patient states has been unable to urinate since last evening with a consistent stream.  States that every time that she has is only mild dribbling and it does burn. Patient denies other symptoms, states that she feels like her heart is beating fast but thinks this is because of the pain. PAST MEDICAL / SURGICAL / SOCIAL / FAMILY HISTORY      has a past medical history of Anxiety, Chronic back pain, Hypertension, and Mitral valve prolapse.       has a past surgical history that includes  section; Hysterectomy (); and Tonsillectomy.       Social History     Socioeconomic History    Marital status: Legally      Spouse name: Not on file    Number of children: Not on file    Years of education: Not on file    Highest education level: Not on file   Occupational History    Not on file   Tobacco Use    Smoking status: Never    Smokeless tobacco: Not on file   Substance and Sexual Activity    Alcohol use: No    Drug use: No    Sexual activity: Not on file   Other Topics Concern    Not on file   Social History Narrative    Not on file     Social Determinants of Health     Financial Resource Strain: Not on file   Food Insecurity: Not on file   Transportation Needs: Not on file   Physical Activity: Not on file   Stress: Not on file   Social Connections: Not on file   Intimate Partner Violence: Not on file   Housing Stability: Not on file History reviewed. No pertinent family history. Allergies:  Motrin [ibuprofen micronized]    Home Medications:  Prior to Admission medications    Medication Sig Start Date End Date Taking? Authorizing Provider   lurasidone (LATUDA) 80 MG TABS tablet Take 80 mg by mouth daily    Historical Provider, MD   acetaminophen (TYLENOL) 325 MG tablet Take 2 tablets by mouth every 6 hours as needed for Pain 2/7/18   Ying Oswald,    propranolol (INDERAL) 10 MG tablet Take 10 mg by mouth 2 times daily. Historical Provider, MD   gabapentin (NEURONTIN) 100 MG capsule Take 100 mg by mouth 2 times daily. Historical Provider, MD   ALPRAZolam Aleah Migdalia) 1 MG tablet Take 1 mg by mouth daily. Historical Provider, MD   Elastic Bandages & Supports (LUMBAR BACK BRACE/SUPPORT PAD) MISC 1 each by Does not apply route daily as needed. 5/4/14   Jimmy Johns,    hydrochlorothiazide (MICROZIDE) 12.5 MG capsule Take 12.5 mg by mouth daily. Historical Provider, MD   amLODIPine-benazepril (LOTREL) 10-20 MG per capsule Take 1 capsule by mouth daily. Historical Provider, MD         REVIEW OF SYSTEMS       Review of Systems   Constitutional:  Negative for activity change, appetite change and fever. Cardiovascular:  Positive for palpitations. Gastrointestinal:  Positive for abdominal distention and abdominal pain. Genitourinary:  Positive for decreased urine volume and dysuria. Musculoskeletal:  Negative for arthralgias, back pain and gait problem. Skin: Negative. PHYSICAL EXAM      INITIAL VITALS:   BP (!) 168/109   Pulse (!) 122   Temp 97 °F (36.1 °C) (Oral)   Resp 18   SpO2 98%     Physical Exam  Vitals reviewed. Constitutional:       Appearance: Normal appearance. HENT:      Head: Normocephalic and atraumatic. Mouth/Throat:      Mouth: Mucous membranes are moist.      Pharynx: Oropharynx is clear. Eyes:      Extraocular Movements: Extraocular movements intact.       Conjunctiva/sclera: Conjunctivae normal.      Pupils: Pupils are equal, round, and reactive to light. Cardiovascular:      Rate and Rhythm: Normal rate and regular rhythm. Pulses: Normal pulses. Heart sounds: Normal heart sounds. Pulmonary:      Effort: Pulmonary effort is normal.      Breath sounds: Normal breath sounds. Abdominal:      General: Abdomen is flat. There is no distension. Palpations: Abdomen is soft. Tenderness: There is abdominal tenderness. Musculoskeletal:         General: Normal range of motion. Cervical back: Normal range of motion and neck supple. Skin:     General: Skin is warm and dry. Neurological:      Mental Status: She is alert. DDX/DIAGNOSTIC RESULTS / EMERGENCY DEPARTMENT COURSE / MDM     Medical Decision Making  Patient is here with severe lateral pain. Bladder scanner showing over 999 mL in the bladder. We will have nursing staff place a Medina catheter, send off urine culture and urinalysis. Patient did have an event that took place after writer left the room. Patient rolled over to check her phone and fell out of the bed. This was witnessed by 2 of the nursing staff were getting ready to place her Medina catheter. Patient not hit her head, lose consciousness and has no residual pain. Patient was initially placed in a c-collar and on a backboard lifted back up into her bed. After thorough evaluation, no significant findings, no new pain and no neck tenderness. Neck is considered cleared. Will await the results of urinalysis and contact urology. Amount and/or Complexity of Data Reviewed  Labs: ordered. Radiology: ordered. EKG      All EKG's are interpreted by the Emergency Department Physician who either signs or Co-signs this chart in the absence of a cardiologist.    EMERGENCY DEPARTMENT COURSE:      ED Course as of 02/18/23 1509   Sat Feb 18, 2023   1507 Given patient's altered mental status tachycardia, adding pan scan.   We will await results of pan scan for definitive treatment. Patient signed out to nighttime resident. [ES]      ED Course User Index  [ES] Columba Bunn MD       PROCEDURES:      CONSULTS:  None    CRITICAL CARE:  There was significant risk of life threatening deterioration of patient's condition requiring my direct management. Critical care time 0 minutes, excluding any documented procedures. FINAL IMPRESSION      1. Urinary retention          DISPOSITION / PLAN     DISPOSITION        PATIENT REFERRED TO:  No follow-up provider specified.     DISCHARGE MEDICATIONS:  New Prescriptions    No medications on file       Columba Bnun MD  Emergency Medicine Resident    (Please note that portions of thisnote were completed with a voice recognition program.  Efforts were made to edit the dictations but occasionally words are mis-transcribed.)        Columba Bunn MD  Resident  02/18/23 5125

## 2023-02-18 NOTE — ED PROVIDER NOTES
[facility] ED     Emergency Department     Faculty Attestation        I performed a history and physical examination of the patient and discussed management with the resident. I reviewed the residents note and agree with the documented findings and plan of care. Any areas of disagreement are noted on the chart. I was personally present for the key portions of any procedures. I have documented in the chart those procedures where I was not present during the key portions. I have reviewed the emergency nurses triage note. I agree with the chief complaint, past medical history, past surgical history, allergies, medications, social and family history as documented unless otherwise noted below. For Physician Assistant/ Nurse Practitioner cases/documentation I have personally evaluated this patient and have completed at least one if not all key elements of the E/M (history, physical exam, and MDM). Additional findings are as noted. Vital Signs: BP: (!) 168/109  Heart Rate: (!) 122  Resp: 18  Temp: 97 °F (36.1 °C) SpO2: 98 %  PCP:  Bret Vizcaino MD    Pertinent Comments:         Critical Care  None      (Please note that portions of this note were completed with a voice recognition program. Efforts were made to edit the dictations but occasionally words are mis-transcribed.  Whenever words are used in this note in any gender, they shall be construed as though they were used in the gender appropriate to the circumstances; and whenever words are used in this note in the singular or plural form, they shall be construed as though they were used in the form appropriate to the circumstances.)    Sidney Soulier, MD [facility] [employer]  Attending Emergency Medicine Physician            Maggi Stone MD  02/18/23 7835

## 2023-02-18 NOTE — ED NOTES
Pt updated on plan of care, denies needs, NAD noted, non labored RR, aox4.       Nancy Alejandre, RN  02/18/23 4431

## 2023-02-18 NOTE — ED PROVIDER NOTES
[facility] [facility]  Emergency Department  Emergency Medicine Resident Sign-out     Care of Elise Newman was assumed from Dr. Peewee Acuna and is being seen for Urinary Retention (Starting last night )  . The patient's initial evaluation and plan have been discussed with the prior provider who initially evaluated the patient. EMERGENCY DEPARTMENT COURSE / MEDICAL DECISION MAKING:       MEDICATIONS GIVEN:  No orders of the defined types were placed in this encounter. LABS / RADIOLOGY:     Labs Reviewed   URINALYSIS WITH MICROSCOPIC - Abnormal; Notable for the following components:       Result Value    Glucose, Ur 2+ (*)     Ketones, Urine TRACE (*)     Urine Hgb SMALL (*)     Protein, UA NEGATIVE  Verified by sulfosalicylic acid test. (*)     Mucus, UA 1+ (*)     All other components within normal limits   CBC WITH AUTO DIFFERENTIAL - Abnormal; Notable for the following components:    WBC 14.8 (*)     Seg Neutrophils 79 (*)     Lymphocytes 12 (*)     Eosinophils % 0 (*)     Immature Granulocytes 1 (*)     Segs Absolute 11.58 (*)     Absolute Mono # 1.30 (*)     All other components within normal limits   BASIC METABOLIC PANEL - Abnormal; Notable for the following components:    Glucose 383 (*)     BUN 27 (*)     Creatinine 1.60 (*)     Est, Glom Filt Rate 37 (*)     Sodium 130 (*)     Chloride 93 (*)     CO2 19 (*)     Anion Gap 18 (*)     All other components within normal limits   TOX SCR, BLD, ED - Abnormal; Notable for the following components:    Acetaminophen Level <5 (*)     Salicylate Lvl <1 (*)     All other components within normal limits   URINE DRUG SCREEN - Abnormal; Notable for the following components:    Benzodiazepine Screen, Urine POSITIVE (*)     All other components within normal limits   CULTURE, URINE   MAGNESIUM       No results found. RECENT VITALS:     Temp: 97 °F (36.1 °C),  Heart Rate: (!) 122, Resp: 18, BP: (!) 168/109, SpO2: 98 %      This patient is a 61 y.o. Female with urinary retention. Patient endorses that she was unable to urinate since last evening and every time that she is tries only gotten minimal urine without burning sensation. Bladder scan at bedside showed over 999 mL in her bladder which is the maximum bladder tenderness can show. While getting prepped to have her Medina placed patient just fell out of bed. This was witnessed by both nursing staff in the room. Patient did not lose consciousness, hit her head or hurt her neck. Patient was transferred over to a backboard and neck collar put back up onto her bed and had her C-spine cleared. Patient not complaining of any more pain at this time or any new pain after the incident. Medina catheter was placed with over 4000 mL drained. Patient who is initially tachycardic in the 140s came down to the 120s but has been persistent. Patient has new GUERO secondary most likely to the urinary retention. Still acting strange in the room. Decision was due to altered mental status work-up including head CT, CT cervical spine, thoracic and lumbar reconstructions and CT chest abdomen pelvis without contrast patient's ED tox and urine drug screen to be completed without any acute findings. Patient's imaging was negative. No abnormality in the CT T scan of the head. CT scan of the abdomen pelvis also did not show any cause for obstructive uropathy. Patient was discussed with urology who agreed with plan. Will follow patient but current recommendation at this time is to maintain Medina catheter. At this time, suspect patient's urinary retention may be secondary to benzodiazepine use versus other intoxicant. Patient's labs showed no evidence of a toxic ingestion. She was treated with IV fluids for tachycardia and GUERO. Given the patient's continued tachycardia and respiratory rates in the 20, sepsis labs such as lactic and blood cultures were added on.   I do not have a source for infection, especially since the patient's urine is negative, will therefore hold off on antibiotic treatment at this time. Suspect patient's tachycardia is due to her not taking her propranolol. Patient states she ran out of her propranolol. We will give her a one-time dose in the emergency department. Discussed the patient with [facility] who accepts her for admission. ED Course as of 02/18/23 1514   Sat Feb 18, 2023   1507 Given patient's altered mental status tachycardia, adding pan scan. We will await results of pan scan for definitive treatment. Patient signed out to nighttime resident. [ES]      ED Course User Index  [ES] Paulino George MD       OUTSTANDING TASKS / RECOMMENDATIONS:    CT scans  VBG, beta hydroxybuturate  Admit for GUERO  ? Urology     FINAL IMPRESSION:     1. Urinary retention        DISPOSITION:         DISPOSITION:  []  Discharge   []  Transfer -    [x]  Admission -  [facility]   []  Against Medical Advice   []  [facility]   FOLLOW-UP: No follow-up provider specified.    DISCHARGE MEDICATIONS: New Prescriptions    No medications on file          Brennon Rolle DO  Emergency Medicine Resident  [facility]        [facility]  Resident  02/18/23 1996

## 2023-02-18 NOTE — H&P
[facility]  Office: [address], DO, Leelee Mcpherson, DO, Pari Mueller, DO, Roxy Everett, DO, My Oliva MD, Mc Oropeza MD, Gianluca Hanley MD, Chang Choudhary MD,  Todd Sinha MD, Khurram Sandra MD, Paulino Medina, DO, Deya Mulligan MD,  Bib Avila MD, Abraham Corona MD, Lucia Caballero, DO, Virginia Okeefe MD, Jodee Rubi MD, Serge Mares DO, Jeanette Escobar MD, Kathleen Quevedo MD, Jerri Salazar MD, Jenna Magallanes MD, Andree Dickerson DO, Bruna Mathews MD, Olga Nye MD, Emily Jeffers, CNP,  Sera Chapman, CNP, Lisa Sharpe, CNP, Perry Evangelista, CNP,  Janet Castano, [facility], Eyad Baker, CNP, Oneil Ceja, CNP, Jose Eduardo Stanford, CNP, Wilfred Dominguez, CNP, Nolan Fontenot, [facility], Scotty Santos, PA-C, Angel Yanes, CNS, Truman Carlsons, CNP, Hailey Taoist, [facility]    HISTORY AND PHYSICAL EXAMINATION            Date:   2/18/2023  Patient name:  Alisson Barger  Date of admission:  2/18/2023 12:24 PM  MRN:   [id number]  Account:  [de-identified]  YOB: 1965  PCP:    Danielle Anders MD  Room:   26/26  Code Status:    No Order    Chief Complaint:     Chief Complaint   Patient presents with    Urinary Retention     Starting last night        History Obtained From:     patient    History of Present Illness:     Alisson Barger is a 61 y.o. Non- / non  female who presents with Urinary Retention (Starting last night )   and is admitted to the hospital for the management of Acute urinary retention. This is a 28-year-old female that presents with complaints of inability to urinate. Symptoms started last evening with lower abdominal pain and inability to urinate. Her pain continued to worsen overnight and ultimately she presented to the emergency room today for further evaluation.   She denies any nausea or vomiting, fevers or chills, constipation or other associated symptoms. She denies any history of urinary retention, incontinence, dribbling or other urinary symptoms. Bladder scan in the emergency room showed greater than a liter of urine, 1400 mL were drained after Medina catheter placed. Past Medical History:     Past Medical History:   Diagnosis Date    Anxiety     Chronic back pain     Hypertension     Mitral valve prolapse         Past Surgical History:     Past Surgical History:   Procedure Laterality Date     SECTION      HYSTERECTOMY (CERVIX STATUS UNKNOWN)      TONSILLECTOMY          Medications Prior to Admission:     Prior to Admission medications    Medication Sig Start Date End Date Taking? Authorizing Provider   lurasidone (LATUDA) 80 MG TABS tablet Take 80 mg by mouth daily    Historical Provider, MD   acetaminophen (TYLENOL) 325 MG tablet Take 2 tablets by mouth every 6 hours as needed for Pain 18   Isaías Rodriguez,    propranolol (INDERAL) 10 MG tablet Take 10 mg by mouth 2 times daily. Historical Provider, MD   gabapentin (NEURONTIN) 100 MG capsule Take 100 mg by mouth 2 times daily. Historical Provider, MD   ALPRAZolam Ozjulianne Mayhead) 1 MG tablet Take 1 mg by mouth daily. Historical Provider, MD   Elastic Bandages & Supports (LUMBAR BACK BRACE/SUPPORT PAD) MISC 1 each by Does not apply route daily as needed. 14   Alda Domínguez, DO   hydrochlorothiazide (MICROZIDE) 12.5 MG capsule Take 12.5 mg by mouth daily. Historical Provider, MD   amLODIPine-benazepril (LOTREL) 10-20 MG per capsule Take 1 capsule by mouth daily. Historical Provider, MD        Allergies:     Motrin [ibuprofen micronized]    Social History:     Tobacco:    reports that she has never smoked. She does not have any smokeless tobacco history on file. Alcohol:      reports no history of alcohol use. Drug Use:  reports no history of drug use.     Family History:     Family History   Problem Relation Age of Onset    Heart Disease Mother        Review of Systems:     Positive and Negative as described in HPI. CONSTITUTIONAL:  negative for fevers, chills, sweats, fatigue, weight loss  HEENT:  negative for vision, hearing changes, runny nose, throat pain  RESPIRATORY:  negative for shortness of breath, cough, congestion, wheezing  CARDIOVASCULAR:  negative for chest pain, palpitations  GASTROINTESTINAL:  negative for nausea, vomiting, diarrhea, constipation, change in bowel habits, positive for lower abdominal pain   GENITOURINARY:  negative for burning with urination, frequency, positive for difficulty with urination  INTEGUMENT:  negative for rash, skin lesions, easy bruising   HEMATOLOGIC/LYMPHATIC:  negative for swelling/edema   ALLERGIC/IMMUNOLOGIC:  negative for urticaria , itching  ENDOCRINE:  negative increase in drinking, increase in urination, hot or cold intolerance  MUSCULOSKELETAL:  negative joint pains, muscle aches, swelling of joints, positive for low back pain  NEUROLOGICAL:  negative for headaches, dizziness, lightheadedness, numbness, pain, tingling extremities  BEHAVIOR/PSYCH:  negative for depression, anxiety    Physical Exam:   /81   Pulse (!) 125   Temp 97 °F (36.1 °C) (Oral)   Resp 16   SpO2 96%   Temp (24hrs), Av °F (36.1 °C), Min:97 °F (36.1 °C), Max:97 °F (36.1 °C)    No results for input(s): POCGLU in the last 72 hours.     Intake/Output Summary (Last 24 hours) at 2023 1810  Last data filed at 2023 1304  Gross per 24 hour   Intake --   Output 1200 ml   Net -1200 ml       General Appearance: alert, well appearing, and in no acute distress  Mental status: oriented to person, place, and time  Head: normocephalic, atraumatic  Eye: no icterus, redness, pupils equal and reactive, extraocular eye movements intact, conjunctiva clear  Ear: normal external ear, no discharge, hearing intact  Nose: no drainage noted  Mouth: mucous membranes moist  Neck: supple, no carotid bruits, thyroid not palpable  Lungs: Bilateral equal air entry, clear to ausculation, no wheezing, rales or rhonchi, normal effort  Cardiovascular: normal rate, regular rhythm, no murmur, gallop, rub  Abdomen: Soft, nontender, nondistended, normal bowel sounds, no hepatomegaly or splenomegaly  Neurologic: There are no new focal motor or sensory deficits, normal muscle tone and bulk, no abnormal sensation, normal speech, cranial nerves II through XII grossly intact  Skin: No gross lesions, rashes, bruising or bleeding on exposed skin area  Extremities: peripheral pulses palpable, no pedal edema or calf pain with palpation  Psych: normal affect    Investigations:      Laboratory Testing:  Recent Results (from the past 24 hour(s))   Urinalysis with Microscopic    Collection Time: 02/18/23 12:49 PM   Result Value Ref Range    Color, UA Yellow Yellow    Turbidity UA Clear Clear    Glucose, Ur 2+ (A) NEGATIVE    Bilirubin Urine NEGATIVE NEGATIVE    Ketones, Urine TRACE (A) NEGATIVE    Specific Gravity, UA 1.020 1.005 - 1.030    Urine Hgb SMALL (A) NEGATIVE    pH, UA 8.0 5.0 - 8.0    Protein, UA NEGATIVE  Verified by sulfosalicylic acid test. (A) NEGATIVE    Urobilinogen, Urine Normal Normal    Nitrite, Urine NEGATIVE NEGATIVE    Leukocyte Esterase, Urine NEGATIVE NEGATIVE    WBC, UA None 0 - 5 /HPF    RBC, UA 2 TO 5 0 - 2 /HPF    Casts UA HYALINE 0 - 2 /LPF    Casts UA 5 TO 10 0 - 2 /LPF    Epithelial Cells UA 0 TO 2 0 - 5 /HPF    Mucus, UA 1+ (A) None   Urine Drug Screen    Collection Time: 02/18/23 12:49 PM   Result Value Ref Range    Amphetamine Screen, Ur NEGATIVE NEGATIVE    Barbiturate Screen, Ur NEGATIVE NEGATIVE    Benzodiazepine Screen, Urine POSITIVE (A) NEGATIVE    Cocaine Metabolite, Urine NEGATIVE NEGATIVE    Methadone Screen, Urine NEGATIVE NEGATIVE    Opiates, Urine NEGATIVE NEGATIVE    Phencyclidine, Urine NEGATIVE NEGATIVE    Cannabinoid Scrn, Ur NEGATIVE NEGATIVE    Oxycodone Screen, Ur NEGATIVE NEGATIVE    Fentanyl, Ur NEGATIVE NEGATIVE    Test Information       Assay provides medical screening only. The absence of expected drug(s) and/or metabolite(s) may indicate diluted or adulterated urine, limitations of testing or timing of collection.    CBC with Auto Differential    Collection Time: 02/18/23  1:21 PM   Result Value Ref Range    WBC 14.8 (H) 3.5 - 11.3 k/uL    RBC 4.51 3.95 - 5.11 m/uL    Hemoglobin 12.6 11.9 - 15.1 g/dL    Hematocrit 38.5 36.3 - 47.1 %    MCV 85.4 82.6 - 102.9 fL    MCH 27.9 25.2 - 33.5 pg    MCHC 32.7 28.4 - 34.8 g/dL    RDW 13.5 11.8 - 14.4 %    Platelets 241 422 - 277 k/uL    MPV 10.4 8.1 - 13.5 fL    NRBC Automated 0.0 0.0 per 100 WBC    Seg Neutrophils 79 (H) 36 - 65 %    Lymphocytes 12 (L) 24 - 43 %    Monocytes 9 3 - 12 %    Eosinophils % 0 (L) 1 - 4 %    Basophils 0 0 - 2 %    Immature Granulocytes 1 (H) 0 %    Segs Absolute 11.58 (H) 1.50 - 8.10 k/uL    Absolute Lymph # 1.73 1.10 - 3.70 k/uL    Absolute Mono # 1.30 (H) 0.10 - 1.20 k/uL    Absolute Eos # <0.03 0.00 - 0.44 k/uL    Basophils Absolute 0.04 0.00 - 0.20 k/uL    Absolute Immature Granulocyte 0.10 0.00 - 0.30 k/uL   BMP    Collection Time: 02/18/23  1:21 PM   Result Value Ref Range    Glucose 383 (H) 70 - 99 mg/dL    BUN 27 (H) 6 - 20 mg/dL    Creatinine 1.60 (H) 0.50 - 0.90 mg/dL    Est, Glom Filt Rate 37 (L) >60 mL/min/1.73m2    Calcium 9.0 8.6 - 10.4 mg/dL    Sodium 130 (L) 135 - 144 mmol/L    Potassium 5.1 3.7 - 5.3 mmol/L    Chloride 93 (L) 98 - 107 mmol/L    CO2 19 (L) 20 - 31 mmol/L    Anion Gap 18 (H) 9 - 17 mmol/L   Magnesium    Collection Time: 02/18/23  1:21 PM   Result Value Ref Range    Magnesium 1.9 1.6 - 2.6 mg/dL   TOX SCR, BLD, ED    Collection Time: 02/18/23  1:21 PM   Result Value Ref Range    Acetaminophen Level <5 (L) 10 - 30 ug/mL    Ethanol <10 <10 mg/dL    Ethanol percent <1.533 <6.899 %    Salicylate Lvl <1 (L) 3 - 10 mg/dL    Toxic Tricyclic Sc,Blood NEGATIVE NEGATIVE   Beta-Hydroxybutyrate    Collection Time: 02/18/23  1:21 PM   Result Value Ref Range    Beta-Hydroxybutyrate 0.77 (H) 0.02 - 0.27 mmol/L   BLOOD GAS, VENOUS    Collection Time: 02/18/23  3:39 PM   Result Value Ref Range    pH, Michael 7.337 7.320 - 7.420    pCO2, Michael 42.4 39 - 55 mm Hg    pO2, Michael 28.1 (L) 30 - 50 mm Hg    HCO3, Venous 22.1 (L) 24 - 30 mmol/L    Negative Base Excess, Michael 3.1 (H) 0.0 - 2.0 mmol/L    O2 Sat, Michael 51.8 (L) 60.0 - 85.0 %    Carboxyhemoglobin 1.1 0 - 5 %    Pt Temp 37.0     FIO2 INFORMATION NOT PROVIDED        Imaging/Diagnostics:  CT HEAD WO CONTRAST    Result Date: 2/18/2023  No acute abnormality of the head and cervical spine. CT CERVICAL SPINE WO CONTRAST    Result Date: 2/18/2023  No acute abnormality of the head and cervical spine. CT CHEST ABDOMEN PELVIS WO CONTRAST Additional Contrast? None    Result Date: 2/18/2023  No post-traumatic findings within the chest, abdomen, and pelvis or within the thoracolumbar spine. Mild dilatation of the renal collecting systems could be due to reported history of urinary retention and a Medina catheter has been placed. CT LUMBAR SPINE TRAUMA RECONSTRUCTION    Result Date: 2/18/2023  No post-traumatic findings within the chest, abdomen, and pelvis or within the thoracolumbar spine. Mild dilatation of the renal collecting systems could be due to reported history of urinary retention and a Medina catheter has been placed. CT THORACIC SPINE TRAUMA RECONSTRUCTION    Result Date: 2/18/2023  No post-traumatic findings within the chest, abdomen, and pelvis or within the thoracolumbar spine. Mild dilatation of the renal collecting systems could be due to reported history of urinary retention and a Medina catheter has been placed.        Assessment :      Hospital Problems             Last Modified POA    * (Principal) Acute urinary retention 2/18/2023 Yes    Primary hypertension 2/18/2023 Yes    Mitral valve prolapse 2/18/2023 Yes    Anxiety 2/18/2023 Yes    Hyperglycemia 2/18/2023 Yes    Hyponatremia 2/18/2023 Yes    GUERO (acute kidney injury) ([facility] [address] 2/18/2023 Yes       Plan:     Patient status inpatient in the Med/Surge    Admit inpatient  Medina catheter placement, urology will follow and manage  Monitor renal function, avoid nephrotoxic agents. Hydrochlorothiazide and her ACE inhibitor are on hold  Continue amlodipine and Inderal for blood pressure, adjust as needed  Check hemoglobin A1c, no history of diabetes with blood sugar greater than 300 at presentation. Likely has new onset type 2 diabetes  Insulin scale  Consider trial of metformin pending renal recovery versus other oral agents. Diabetic diet  Will need outpatient eye exam and close follow-up for new onset diabetes  Avoid NSAIDs, Tylenol and Norco for pain  IV hydration, monitor for postobstructive diuresis  Check urine and serum Osmo, check urine sodium  Check TSH, added onto current labs  See orders for details    Consultations:   IP CONSULT TO HOSPITALIST  IP CONSULT TO UROLOGY     Patient is admitted as inpatient status because of co-morbidities listed above, severity of signs and symptoms as outlined, requirement for current medical therapies and most importantly because of direct risk to patient if care not provided in a hospital setting. Expected length of stay > 48 hours.     Flavia Pyle DO  2/18/2023  6:10 PM    Copy sent to Dr. Kassie Valverde MD

## 2023-02-18 NOTE — ED NOTES
Family at bedside, updated on plan of care, pt resting on cot, aox4, non labored RR. Denies needs.       Kal Gillette, RN  02/18/23 0800

## 2023-02-18 NOTE — ED NOTES
Phlebotomy called, requesting blood cultures and lactic, spoke to her on the phone ext [phone], reports she will be coming down for lab draws.       Judy Jones RN  02/18/23 2044

## 2023-02-18 NOTE — ED NOTES
Pt provided box lunch, resident at bedside to update patient on plan of care, notified patient did not take home meds today.       Cody Fine RN  02/18/23 6526

## 2023-02-18 NOTE — CONSULTS
Margherita Goltz Chipper Aver, MD [address]    Urology Consultation      Patient:  Jacki Espinosa  MRN: [id number]  YOB: 1965    CHIEF COMPLAINT:  Urinary retention    HISTORY OF PRESENT ILLNESS:   The patient is a 61 y.o. female who presents with urinary retention, morocho placed for 1.4L. Last evening, patient started having inability to urinate, also started having lower abdominal pain. Pain became progressively worse. No history of urinary retention, incontinence, dribbling, dysuria or other urinary symptoms. Patient endorses having some constipation however last bowel movement was yesterday. Denies any fevers, chills, nausea, or vomiting. Endorses having bilateral flank pain as well. Denies any UTIs or urological history. Patient is ambulating. WBC 14.8, Cr 1.60 (unknown baseline, was 0.71 in 2017). UA negative nitrites, negative leukocyte esterase. CT abdomen pelvis shows moderate bilateral hydroureteronephrosis down to bladder. Patient's old records, notes and chart reviewed and summarized above.     Past Medical History:    Past Medical History:   Diagnosis Date    Anxiety     Chronic back pain     Hypertension     Mitral valve prolapse        Past Surgical History:    Past Surgical History:   Procedure Laterality Date     SECTION      HYSTERECTOMY (CERVIX STATUS UNKNOWN)      TONSILLECTOMY         Medications:      Current Facility-Administered Medications:     ALPRAZolam (XANAX) tablet 1 mg, 1 mg, Oral, Daily, Sherri Goldberg Orlop, DO, 1 mg at 23    gabapentin (NEURONTIN) capsule 100 mg, 100 mg, Oral, BID, Sherri Goldberg Orlop, DO, 100 mg at 23    lurasidone (LATUDA) tablet 80 mg, 80 mg, Oral, Daily, Sherri Goldberg Orlop, DO, 80 mg at 23    propranolol (INDERAL) tablet 10 mg, 10 mg, Oral, BID, Sherri Goldberg Orlop, DO, 10 mg at 23    amLODIPine (NORVASC) tablet 5 mg, 5 mg, Oral, Daily, Sherri Goldberg Orlop, DO, 5 mg at 23    tamsulosin (FLOMAX) capsule 0.4 mg, 0.4 mg, Oral, Daily, Rubina Whitman Orlop, DO, 0.4 mg at 02/18/23 2037    sodium chloride flush 0.9 % injection 5-40 mL, 5-40 mL, IntraVENous, 2 times per day, Rubina Poncho Orlop, DO, 10 mL at 02/18/23 2036    sodium chloride flush 0.9 % injection 5-40 mL, 5-40 mL, IntraVENous, PRN, Rubina Whitman Orlop, DO    0.9 % sodium chloride infusion, , IntraVENous, PRN, Rubina Whitman Orlop, DO    potassium chloride (KLOR-CON M) extended release tablet 40 mEq, 40 mEq, Oral, PRN **OR** potassium bicarb-citric acid (EFFER-K) effervescent tablet 40 mEq, 40 mEq, Oral, PRN **OR** potassium chloride 10 mEq/100 mL IVPB (Peripheral Line), 10 mEq, IntraVENous, PRN, Rubina Whitman Orlop, DO    enoxaparin (LOVENOX) injection 40 mg, 40 mg, SubCUTAneous, Daily, Todd Yousseflop, DO, 40 mg at 02/18/23 2019    ondansetron (ZOFRAN-ODT) disintegrating tablet 4 mg, 4 mg, Oral, Q8H PRN **OR** ondansetron (ZOFRAN) injection 4 mg, 4 mg, IntraVENous, Q6H PRN, Rubina Pondelfina Orlop, DO    polyethylene glycol (GLYCOLAX) packet 17 g, 17 g, Oral, Daily PRN, Rubina Whitman Orlop, DO    bisacodyl (DULCOLAX) suppository 10 mg, 10 mg, Rectal, Daily PRN, Rubina Whitman Orlop, DO    acetaminophen (TYLENOL) tablet 650 mg, 650 mg, Oral, Q6H PRN **OR** acetaminophen (TYLENOL) suppository 650 mg, 650 mg, Rectal, Q6H PRN, Todd Yousseflop, DO    0.9 % sodium chloride infusion, , IntraVENous, Continuous, Rubina Whitman Orlop, DO, Last Rate: 125 mL/hr at 02/18/23 2022, New Bag at 02/18/23 2022    insulin lispro (HUMALOG) injection vial 0-4 Units, 0-4 Units, SubCUTAneous, TID WC, Rubina Smith, , 2 Units at 02/18/23 2032    insulin lispro (HUMALOG) injection vial 0-4 Units, 0-4 Units, SubCUTAneous, Nightly, Todd Smith DO    glucose chewable tablet 16 g, 4 tablet, Oral, PRN, Todd Smith, DO    dextrose bolus 10% 125 mL, 125 mL, IntraVENous, PRN **OR** dextrose bolus 10% 250 mL, 250 mL, IntraVENous, PRN, Todd Smith, DO    glucagon (rDNA) injection 1 mg, 1 mg, SubCUTAneous, PRN, Todd Smith, DO    dextrose 10 % infusion, , IntraVENous, Continuous PRN, Yeny Yousseflop, DO    HYDROcodone-acetaminophen (NORCO) 5-325 MG per tablet 1 tablet, 1 tablet, Oral, Q6H PRN, Yeny Smith DO, 1 tablet at 02/18/23 2017    Current Outpatient Medications:     lurasidone (LATUDA) 80 MG TABS tablet, Take 80 mg by mouth daily, Disp: , Rfl:     acetaminophen (TYLENOL) 325 MG tablet, Take 2 tablets by mouth every 6 hours as needed for Pain, Disp: 50 tablet, Rfl: 0    propranolol (INDERAL) 10 MG tablet, Take 10 mg by mouth 2 times daily. , Disp: , Rfl:     gabapentin (NEURONTIN) 100 MG capsule, Take 100 mg by mouth 2 times daily. , Disp: , Rfl:     ALPRAZolam (XANAX) 1 MG tablet, Take 1 mg by mouth daily. , Disp: , Rfl:     Elastic Bandages & Supports (LUMBAR BACK BRACE/SUPPORT PAD) MISC, 1 each by Does not apply route daily as needed. , Disp: 1 each, Rfl: 0    hydrochlorothiazide (MICROZIDE) 12.5 MG capsule, Take 12.5 mg by mouth daily. , Disp: , Rfl:     amLODIPine-benazepril (LOTREL) 10-20 MG per capsule, Take 1 capsule by mouth daily. , Disp: , Rfl:     Allergies:     Allergies   Allergen Reactions    Motrin [Ibuprofen Micronized] Swelling     States sometimes it swells her eyes up       Social History:   Social History     Socioeconomic History    Marital status: Legally      Spouse name: Not on file    Number of children: Not on file    Years of education: Not on file    Highest education level: Not on file   Occupational History    Not on file   Tobacco Use    Smoking status: Never    Smokeless tobacco: Not on file   Substance and Sexual Activity    Alcohol use: No    Drug use: No    Sexual activity: Not on file   Other Topics Concern    Not on file   Social History Narrative    Not on file     Social Determinants of Health     Financial Resource Strain: Not on file   Food Insecurity: Not on file   Transportation Needs: Not on file   Physical Activity: Not on file   Stress: Not on file   Social Connections: Not on file   Intimate Partner Violence: Not on file   Housing Stability: Not on file       Family History:    Family History   Problem Relation Age of Onset    Heart Disease Mother        REVIEW OF SYSTEMS:  A comprehensive 14 point review of systems was obtained. Constitutional: No fatigue   Eyes: No blurry vision  Ears, nose, mouth, throat, face: No ringing in the ears; no facial droop. Respiratory: No cough or cold. Cardiovascular: No palpitations  Gastrointestinal: No diarrhea or constipation. Genitourinary: No burning with urination  Integument/Skin: No rashes  Hematologic/Lymphatic: No easy bruising  Musculoskeletal: No muscle pains  Neurologic: No weakness in the extremities. Psychiatric: No depression or suicidal thoughts. Endocrine: No heat or cold intolerances. Allergic/Immunologic: No current seasonal allergies; no skin hives. Physical Exam:      This a 61 y.o. female   Vitals:    02/18/23 2256   BP:    Pulse: (!) 101   Resp:    Temp:    SpO2:      Constitutional: Patient in no acute distress. Neuro: alert and oriented to person place and time. Head: Atraumatic and normocephalic. Neck: Trachea midline. Ext: 2+ radial pulses bilaterally. Psych: Mood and affect normal.  Skin: No rashes or bruising present. Lungs: Respiratory effort normal.  Cardiovascular:  Regular rhythm. Abdomen: Soft, non-tender, non-distended. Has bilateral CVA tenderness  Bladder non-tender and not distended. Lymphatics: no palpable lymphadenopathy  Pelvic exam: deferred. Rectal exam not indicated.     Labs:  Recent Labs     02/18/23  1321   WBC 14.8*   HGB 12.6   HCT 38.5   MCV 85.4        Recent Labs     02/18/23  1321 02/18/23  2147   * 133*   K 5.1 4.2   CL 93* 102   CO2 19* 19*   BUN 27*  --    CREATININE 1.60*  --        Recent Labs     02/18/23  1249   COLORU Yellow   PHUR 8.0   WBCUA None   RBCUA 2 TO 5   MUCUS 1+*   SPECGRAV 1.020   LEUKOCYTESUR NEGATIVE   UROBILINOGEN Normal BILIRUBINUR NEGATIVE           -----------------------------------------------------------------  Imaging Results:      CT CHEST ABDOMEN PELVIS WO CONTRAST Additional Contrast? None - independently reviewed and radiology report verified demonstrating:     Result Date: 2/18/2023  EXAMINATION: CT OF THE CHEST, ABDOMEN, AND PELVIS WITHOUT CONTRAST; CT OF THE LUMBAR SPINE WITHOUT CONTRAST; CT OF THE THORACIC SPINE WITHOUT CONTRAST 2/18/2023 2:48 pm TECHNIQUE: CT of the chest, abdomen and pelvis was performed without the administration of intravenous contrast. Multiplanar reformatted images are provided for review. Automated exposure control, iterative reconstruction, and/or weight based adjustment of the mA/kV was utilized to reduce the radiation dose to as low as reasonably achievable. Reformatted images of the thoracic and lumbar spine were obtained. COMPARISON: Abdomen/pelvis CT on 07/08/2012 HISTORY: Altered mental status, tachycardia, severe bladder pain with urinary retention. Witnessed fall. FINDINGS: Image quality mildly degraded by motion artifact. Chest: Mediastinum: Heart is not enlarged. No pericardial effusion. Thoracic aorta is normal caliber. No lymphadenopathy. Thyroid and esophagus are unremarkable. Lungs/pleura: Left lung is clear. Scarring along the right minor fissure. Dependent atelectasis in the right lung. No pleural effusion or pneumothorax. Soft Tissues/Bones: No acute abnormality. Abdomen/Pelvis: Organs: Mild dilatation of the renal collecting systems could be due to reported history of urinary retention as no obstructing stone is seen. Remaining solid organs and the gallbladder are unremarkable. GI/Bowel: No acute abnormality. Pelvis: A Medina catheter has been placed and the urinary bladder is decompressed. Uterus may be absent. No lymphadenopathy or free fluid. Peritoneum/Retroperitoneum: No lymphadenopathy or ascites.  Bones/Soft Tissues: Subcutaneous fat stranding along the right lower flank could be due to contusion. Thoracic/Lumbar Spine: No acute fracture. No malalignment. No significant degenerative changes. Chronic compression deformities along the superior endplates of L1 and L2 due to Schmorl's nodes. No post-traumatic findings within the chest, abdomen, and pelvis or within the thoracolumbar spine. Mild dilatation of the renal collecting systems could be due to reported history of urinary retention and a Morocho catheter has been placed. Assessment and Plan   Impression:  60 yo female with  Urinary retention, morocho in for 1.4L  Mild bilateral hydroureteronephrosis, likely due to retention  Acute kidney injury, likely due to retention  Patient Active Problem List   Diagnosis    Displacement of lumbar intervertebral disc without myelopathy    Acute urinary retention    Primary hypertension    Mitral valve prolapse    Anxiety    Hyperglycemia    Hyponatremia    GUERO (acute kidney injury) ([facility] [address]       Plan:   Continue morocho catheter, will need morocho catheter upon discharge  Avoid constipation, scheduled bowel regimen if constipated  Avoid narcotics or anticholinergics   Encourage ambulation/out of bed as able   Monitor creatinine    Abram Oquendo MD  Urology Resident, PGY-3  I have reviewed the history above and agree. I have repeated the key portions of the physical exam and concur with the resident's findings. I have reviewed all laboratory findings and imaging reports/films. I agree with the plan as noted above.     Electronically signed by Angelina Tucker MD on 2/19/2023 at 10:46 AM

## 2023-02-18 NOTE — ED NOTES
Writekaren and Constantine Williamson RN entered room for morocho catheter, patient has right side rail up. Patient was reaching over her left side, reaching for bedside chair. Patient had cell phone on bedside chair. Patient then instantly rolled out of bed onto the floor. This was witnessed by Latoya Lopez and . Patient did not hit head, did not hit neck, did not lose consciousness. Staff assist called to bedside. Dr. Yunior Daniel at bedside. Patient is aox4, denies any pain. Dr. Yunior Daniel at bedside, clear CTLS, patient rolled onto backboard and safety assisted back into bed via backboard. Lluvia Simmons RN, charge nurse at bedside as well. Patient remains aox4, denies pain or injury.       Deyanira Dolan RN  02/18/23 0169

## 2023-02-19 ENCOUNTER — APPOINTMENT (OUTPATIENT)
Dept: ULTRASOUND IMAGING | Age: 58
DRG: 696 | End: 2023-02-19
Payer: COMMERCIAL

## 2023-02-19 PROBLEM — N13.30 BILATERAL HYDRONEPHROSIS: Status: ACTIVE | Noted: 2023-02-19

## 2023-02-19 LAB
ABSOLUTE EOS #: 0.08 K/UL (ref 0–0.44)
ABSOLUTE IMMATURE GRANULOCYTE: 0.09 K/UL (ref 0–0.3)
ABSOLUTE LYMPH #: 2.51 K/UL (ref 1.1–3.7)
ABSOLUTE MONO #: 1.01 K/UL (ref 0.1–1.2)
ANION GAP SERPL CALCULATED.3IONS-SCNC: 14 MMOL/L (ref 9–17)
BASOPHILS # BLD: 1 % (ref 0–2)
BASOPHILS ABSOLUTE: 0.06 K/UL (ref 0–0.2)
BUN SERPL-MCNC: 13 MG/DL (ref 6–20)
CALCIUM SERPL-MCNC: 8.5 MG/DL (ref 8.6–10.4)
CHLORIDE SERPL-SCNC: 103 MMOL/L (ref 98–107)
CO2 SERPL-SCNC: 20 MMOL/L (ref 20–31)
CREAT SERPL-MCNC: 0.56 MG/DL (ref 0.5–0.9)
EOSINOPHILS RELATIVE PERCENT: 1 % (ref 1–4)
EST. AVERAGE GLUCOSE BLD GHB EST-MCNC: 260 MG/DL
GFR SERPL CREATININE-BSD FRML MDRD: >60 ML/MIN/1.73M2
GLUCOSE BLD-MCNC: 265 MG/DL (ref 65–105)
GLUCOSE BLD-MCNC: 269 MG/DL (ref 65–105)
GLUCOSE BLD-MCNC: 286 MG/DL (ref 65–105)
GLUCOSE BLD-MCNC: 299 MG/DL (ref 65–105)
GLUCOSE BLD-MCNC: 302 MG/DL (ref 65–105)
GLUCOSE SERPL-MCNC: 322 MG/DL (ref 70–99)
HBA1C MFR BLD: 10.7 % (ref 4–6)
HCT VFR BLD AUTO: 34 % (ref 36.3–47.1)
HGB BLD-MCNC: 10.9 G/DL (ref 11.9–15.1)
IMMATURE GRANULOCYTES: 1 %
LYMPHOCYTES # BLD: 23 % (ref 24–43)
MCH RBC QN AUTO: 27.9 PG (ref 25.2–33.5)
MCHC RBC AUTO-ENTMCNC: 32.1 G/DL (ref 28.4–34.8)
MCV RBC AUTO: 87 FL (ref 82.6–102.9)
MICROORGANISM SPEC CULT: NO GROWTH
MONOCYTES # BLD: 9 % (ref 3–12)
NRBC AUTOMATED: 0 PER 100 WBC
PDW BLD-RTO: 13.5 % (ref 11.8–14.4)
PLATELET # BLD AUTO: 323 K/UL (ref 138–453)
PMV BLD AUTO: 10.3 FL (ref 8.1–13.5)
POTASSIUM SERPL-SCNC: 4 MMOL/L (ref 3.7–5.3)
RBC # BLD: 3.91 M/UL (ref 3.95–5.11)
SEG NEUTROPHILS: 65 % (ref 36–65)
SEGMENTED NEUTROPHILS ABSOLUTE COUNT: 7.22 K/UL (ref 1.5–8.1)
SODIUM SERPL-SCNC: 137 MMOL/L (ref 135–144)
SPECIMEN DESCRIPTION: NORMAL
WBC # BLD AUTO: 11 K/UL (ref 3.5–11.3)

## 2023-02-19 PROCEDURE — 76770 US EXAM ABDO BACK WALL COMP: CPT

## 2023-02-19 PROCEDURE — 36415 COLL VENOUS BLD VENIPUNCTURE: CPT

## 2023-02-19 PROCEDURE — 51798 US URINE CAPACITY MEASURE: CPT

## 2023-02-19 PROCEDURE — 2580000003 HC RX 258: Performed by: INTERNAL MEDICINE

## 2023-02-19 PROCEDURE — 80048 BASIC METABOLIC PNL TOTAL CA: CPT

## 2023-02-19 PROCEDURE — 85025 COMPLETE CBC W/AUTO DIFF WBC: CPT

## 2023-02-19 PROCEDURE — 6370000000 HC RX 637 (ALT 250 FOR IP): Performed by: INTERNAL MEDICINE

## 2023-02-19 PROCEDURE — 97530 THERAPEUTIC ACTIVITIES: CPT

## 2023-02-19 PROCEDURE — 93005 ELECTROCARDIOGRAM TRACING: CPT | Performed by: INTERNAL MEDICINE

## 2023-02-19 PROCEDURE — 1200000000 HC SEMI PRIVATE

## 2023-02-19 PROCEDURE — 6360000002 HC RX W HCPCS: Performed by: INTERNAL MEDICINE

## 2023-02-19 PROCEDURE — 97161 PT EVAL LOW COMPLEX 20 MIN: CPT

## 2023-02-19 PROCEDURE — 82947 ASSAY GLUCOSE BLOOD QUANT: CPT

## 2023-02-19 PROCEDURE — 51701 INSERT BLADDER CATHETER: CPT

## 2023-02-19 PROCEDURE — 99232 SBSQ HOSP IP/OBS MODERATE 35: CPT | Performed by: INTERNAL MEDICINE

## 2023-02-19 RX ADMIN — ALPRAZOLAM 1 MG: 1 TABLET ORAL at 09:31

## 2023-02-19 RX ADMIN — LURASIDONE HYDROCHLORIDE 80 MG: 40 TABLET, FILM COATED ORAL at 09:31

## 2023-02-19 RX ADMIN — TAMSULOSIN HYDROCHLORIDE 0.4 MG: 0.4 CAPSULE ORAL at 09:31

## 2023-02-19 RX ADMIN — PROPRANOLOL HYDROCHLORIDE 10 MG: 10 TABLET ORAL at 21:07

## 2023-02-19 RX ADMIN — INSULIN LISPRO 2 UNITS: 100 INJECTION, SOLUTION INTRAVENOUS; SUBCUTANEOUS at 17:39

## 2023-02-19 RX ADMIN — HYDROCODONE BITARTRATE AND ACETAMINOPHEN 1 TABLET: 5; 325 TABLET ORAL at 04:35

## 2023-02-19 RX ADMIN — ENOXAPARIN SODIUM 40 MG: 100 INJECTION SUBCUTANEOUS at 09:31

## 2023-02-19 RX ADMIN — GABAPENTIN 100 MG: 100 CAPSULE ORAL at 09:32

## 2023-02-19 RX ADMIN — HYDROCODONE BITARTRATE AND ACETAMINOPHEN 1 TABLET: 5; 325 TABLET ORAL at 19:10

## 2023-02-19 RX ADMIN — INSULIN LISPRO 2 UNITS: 100 INJECTION, SOLUTION INTRAVENOUS; SUBCUTANEOUS at 09:33

## 2023-02-19 RX ADMIN — GABAPENTIN 100 MG: 100 CAPSULE ORAL at 21:07

## 2023-02-19 RX ADMIN — INSULIN LISPRO 2 UNITS: 100 INJECTION, SOLUTION INTRAVENOUS; SUBCUTANEOUS at 12:51

## 2023-02-19 RX ADMIN — SODIUM CHLORIDE: 9 INJECTION, SOLUTION INTRAVENOUS at 03:40

## 2023-02-19 RX ADMIN — INSULIN LISPRO 4 UNITS: 100 INJECTION, SOLUTION INTRAVENOUS; SUBCUTANEOUS at 21:08

## 2023-02-19 ASSESSMENT — PAIN DESCRIPTION - PAIN TYPE
TYPE: ACUTE PAIN

## 2023-02-19 ASSESSMENT — PAIN DESCRIPTION - ORIENTATION
ORIENTATION: RIGHT;LEFT;LOWER
ORIENTATION: LEFT;RIGHT
ORIENTATION: MID;LOWER
ORIENTATION: LOWER

## 2023-02-19 ASSESSMENT — PAIN DESCRIPTION - DESCRIPTORS
DESCRIPTORS: ACHING;DULL
DESCRIPTORS: ACHING;DISCOMFORT
DESCRIPTORS: ACHING;DISCOMFORT
DESCRIPTORS: DISCOMFORT
DESCRIPTORS: DULL;ACHING
DESCRIPTORS: ACHING;DULL

## 2023-02-19 ASSESSMENT — PAIN - FUNCTIONAL ASSESSMENT
PAIN_FUNCTIONAL_ASSESSMENT: ACTIVITIES ARE NOT PREVENTED
PAIN_FUNCTIONAL_ASSESSMENT: PREVENTS OR INTERFERES SOME ACTIVE ACTIVITIES AND ADLS
PAIN_FUNCTIONAL_ASSESSMENT: ACTIVITIES ARE NOT PREVENTED
PAIN_FUNCTIONAL_ASSESSMENT: PREVENTS OR INTERFERES SOME ACTIVE ACTIVITIES AND ADLS
PAIN_FUNCTIONAL_ASSESSMENT: PREVENTS OR INTERFERES SOME ACTIVE ACTIVITIES AND ADLS

## 2023-02-19 ASSESSMENT — PAIN SCALES - GENERAL
PAINLEVEL_OUTOF10: 5
PAINLEVEL_OUTOF10: 7
PAINLEVEL_OUTOF10: 2
PAINLEVEL_OUTOF10: 4
PAINLEVEL_OUTOF10: 7
PAINLEVEL_OUTOF10: 7

## 2023-02-19 ASSESSMENT — PAIN DESCRIPTION - FREQUENCY
FREQUENCY: CONTINUOUS

## 2023-02-19 ASSESSMENT — PAIN DESCRIPTION - ONSET
ONSET: ON-GOING

## 2023-02-19 ASSESSMENT — PAIN DESCRIPTION - LOCATION
LOCATION: BACK;FLANK
LOCATION: BACK
LOCATION: BACK
LOCATION: FLANK;BACK
LOCATION: BACK
LOCATION: BACK

## 2023-02-19 NOTE — ED NOTES
The following labs were labeled with appropriate pt sticker and tubed to lab:     [] Blue     [] Lavender   [] on ice  [x] Green/yellow  [x] Green/black [] on ice  [] Willma Economy  [] on ice  [] Yellow  [] Red  [] Type/ Screen  [] ABG  [] VBG    [] COVID-19 swab    [] Rapid  [] PCR  [] Flu swab  [] Peds Viral Panel     [] Urine Sample  [] Fecal Sample  [] Pelvic Cultures  [] Blood Cultures  [] X 2  [] STREP Cultures         Fabio Zhou RN  02/18/23 1262

## 2023-02-19 NOTE — PROGRESS NOTES
[facility]  Office: [address], DO, Veronique Gallo, DO, Nito Caryberto, DO, Chandler Soria Blood, DO, Ivy Estevez MD, Kevin Brumfield MD, Rj Calderon MD, Sacha Wise MD,  Samina Beltre MD, Singh Evans MD, Emerita Cotton, DO, Corrie Yip MD,  Walter Middleton MD, Janice West MD, Chadd Guadalupe, DO, Marisela Linares MD, Lera Mcardle, MD, Alysha Brooks, DO, Russell Oleary MD, Adin Urias MD, Keith Lombard, MD, Margarita Diaz MD, Kayley Berkowitz, DO, Amber Dela Cruz MD, Sameer Degroot MD, Kristin Mary, Macarena Leon, CNP, Tanvi Schmidt, CNP, Geovanni Reno, CNP,  Lauren Stovall, [facility], Jonah Pelayo, CNP, Adonay Borja, CNP, Yasmani Chavez, CNP, Pedro Luis rOo, CNP, Haley Timmons, CNP, Shanique Stein PA-C, Sagar Mortensen, [facility], Mikey Gram, CNP, Ashli Brooks, [facility]    Progress Note    2/19/2023    2:17 PM    Name:   Harmeet Mack  MRN:     [id number]     [place]:      [de-identified]   Room:   [address] [place] [address] Day:  1  Admit Date:  2/18/2023 12:24 PM    PCP:   Cristiano De Jesus MD  Code Status:  Full Code    Subjective:     C/C:   Chief Complaint   Patient presents with    Urinary Retention     Starting last night      Interval History Status: improved. Patient continues have complaints of low back pain but otherwise denies any acute complaints. Medina cath remains in place, apparently urology plans for void trial today. Denies any chest pain, shortness of breath, nausea vomiting, fevers or chills. Brief History: This is a 53-year-old female who presents with a complaint of inability to urinate. She apparently has not been able to urinate for several hours and presented to the emergency room with increasing pain and was found to have acute retention of greater than 1 L urine.   Medina catheter has been placed and urology consult    Review of Systems: Constitutional:  negative for chills, fevers, sweats  Respiratory:  negative for cough, dyspnea on exertion, shortness of breath, wheezing  Cardiovascular:  negative for chest pain, chest pressure/discomfort, lower extremity edema, palpitations  Gastrointestinal:  negative for abdominal pain, constipation, diarrhea, nausea, vomiting  Neurological:  negative for dizziness, headache    Medications: Allergies: Allergies   Allergen Reactions    Motrin [Ibuprofen Micronized] Swelling     States sometimes it swells her eyes up       Current Meds:   Scheduled Meds:    ALPRAZolam  1 mg Oral Daily    gabapentin  100 mg Oral BID    lurasidone  80 mg Oral Daily    propranolol  10 mg Oral BID    amLODIPine  5 mg Oral Daily    tamsulosin  0.4 mg Oral Daily    sodium chloride flush  5-40 mL IntraVENous 2 times per day    enoxaparin  40 mg SubCUTAneous Daily    insulin lispro  0-4 Units SubCUTAneous TID WC    insulin lispro  0-4 Units SubCUTAneous Nightly     Continuous Infusions:    sodium chloride      sodium chloride 50 mL/hr at 02/19/23 0943    dextrose       PRN Meds: sodium chloride flush, sodium chloride, potassium chloride **OR** potassium alternative oral replacement **OR** potassium chloride, ondansetron **OR** ondansetron, polyethylene glycol, bisacodyl, acetaminophen **OR** acetaminophen, glucose, dextrose bolus **OR** dextrose bolus, glucagon (rDNA), dextrose, HYDROcodone 5 mg - acetaminophen    Data:     Past Medical History:   has a past medical history of Anxiety, Chronic back pain, Hypertension, and Mitral valve prolapse. Social History:   reports that she has never smoked. She does not have any smokeless tobacco history on file. She reports that she does not drink alcohol and does not use drugs.      Family History:   Family History   Problem Relation Age of Onset    Heart Disease Mother        Vitals:  /71   Pulse 97   Temp 98.6 °F (37 °C) (Oral)   Resp 16   Ht 5' 2\" (1.575 m)   Wt 163 lb 2.3 oz (74 kg)   SpO2 94%   BMI 29.84 kg/m²   Temp (24hrs), Av.6 °F (37 °C), Min:98.5 °F (36.9 °C), Max:98.6 °F (37 °C)    Recent Labs     23  0857 23  1221   POCGLU 291* 286* 265*       I/O (24Hr): Intake/Output Summary (Last 24 hours) at 2023 1417  Last data filed at 2023 1019  Gross per 24 hour   Intake 2248.63 ml   Output 2230 ml   Net 18.63 ml       Labs:  Hematology:  Recent Labs     23  13223  0632   WBC 14.8* 11.0   RBC 4.51 3.91*   HGB 12.6 10.9*   HCT 38.5 34.0*   MCV 85.4 87.0   MCH 27.9 27.9   MCHC 32.7 32.1   RDW 13.5 13.5    323   MPV 10.4 10.3     Chemistry:  Recent Labs     23  1321 237 23  0632   * 133* 137   K 5.1 4.2 4.0   CL 93* 102 103   CO2 19* 19* 20   GLUCOSE 383*  --  322*   BUN 27*  --  13   CREATININE 1.60*  --  0.56   MG 1.9  --   --    ANIONGAP 18* 12 14   LABGLOM 37*  --  >60   CALCIUM 9.0  --  8.5*     Recent Labs     23  0857 23  1221   TSH 0.71  --   --   --    POCGLU  --  291* 286* 265*     ABG:  Lab Results   Component Value Date/Time    FIO2 INFORMATION NOT PROVIDED 2023 03:39 PM     Lab Results   Component Value Date/Time    SPECIAL RT HAND 11ML 2023 07:35 PM     Lab Results   Component Value Date/Time    CULTURE NO GROWTH 12 HOURS 2023 07:35 PM       Radiology:  CT HEAD WO CONTRAST    Result Date: 2023  No acute abnormality of the head and cervical spine. CT CERVICAL SPINE WO CONTRAST    Result Date: 2023  No acute abnormality of the head and cervical spine. CT CHEST ABDOMEN PELVIS WO CONTRAST Additional Contrast? None    Result Date: 2023  No post-traumatic findings within the chest, abdomen, and pelvis or within the thoracolumbar spine. Mild dilatation of the renal collecting systems could be due to reported history of urinary retention and a Medina catheter has been placed.      CT LUMBAR SPINE TRAUMA RECONSTRUCTION    Result Date: 2/18/2023  No post-traumatic findings within the chest, abdomen, and pelvis or within the thoracolumbar spine. Mild dilatation of the renal collecting systems could be due to reported history of urinary retention and a Medina catheter has been placed. CT THORACIC SPINE TRAUMA RECONSTRUCTION    Result Date: 2/18/2023  No post-traumatic findings within the chest, abdomen, and pelvis or within the thoracolumbar spine. Mild dilatation of the renal collecting systems could be due to reported history of urinary retention and a Medina catheter has been placed. Physical Examination:        General appearance:  alert, cooperative and no distress  Mental Status:  oriented to person, place and time and normal affect  Lungs:  clear to auscultation bilaterally, normal effort  Heart:  regular rate and rhythm, no murmur  Abdomen:  soft, nontender, nondistended, normal bowel sounds, no masses, hepatomegaly, splenomegaly  Extremities:  no edema, redness, tenderness in the calves  Skin:  no gross lesions, rashes, induration    Assessment:        Hospital Problems             Last Modified POA    * (Principal) Acute urinary retention 2/18/2023 Yes    Primary hypertension 2/18/2023 Yes    Mitral valve prolapse 2/18/2023 Yes    Anxiety 2/18/2023 Yes    Hyperglycemia 2/18/2023 Yes    Hyponatremia 2/18/2023 Yes    GUERO (acute kidney injury) ([address] 2/18/2023 Yes    Bilateral hydronephrosis 2/19/2023 Yes       Plan:        IV hydration  Medina management per urology  Trend glucose, await hemoglobin A1c.   Correct electrolytes as needed, discontinue every 6 hour labs as sodium is corrected  Avoid nephrotoxic agents, renal function improved status post Medina catheter placement  Activity as tolerated  GI DVT prophylaxis    Sherrie Sosa DO  2/19/2023  2:17 PM

## 2023-02-19 NOTE — PLAN OF CARE
Problem: Pain  Goal: Verbalizes/displays adequate comfort level or baseline comfort level  Outcome: Progressing     Problem: Skin/Tissue Integrity  Goal: Absence of new skin breakdown  Description: 1. Monitor for areas of redness and/or skin breakdown  2. Assess vascular access sites hourly  3. Every 4-6 hours minimum:  Change oxygen saturation probe site  4. Every 4-6 hours:  If on nasal continuous positive airway pressure, respiratory therapy assess nares and determine need for appliance change or resting period.   Outcome: Progressing     Problem: Safety - Adult  Goal: Free from fall injury  Outcome: Progressing     Problem: ABCDS Injury Assessment  Goal: Absence of physical injury  Outcome: Progressing  live male infant, apgars 9, 9 over intact perineum.  Clear AF.  Baby's nose and mouth bulb suctioned at perineum.  Shoulders delivered intact.  RML and 3rd deg lace made and repaired with 2-0, 3-0 vicryl as well as rectal sphincter reapprox with 0 vicryl. .  Uterus, cervix, vagina, sphincter and rectum intact.  Pt with  in stable condition.    Geraldine Carbajal M.D.

## 2023-02-19 NOTE — PLAN OF CARE
Discussed with Dr. Ana Ardon morocho catheter   Check PVR every 6 hours and straight cath for PVR > 400 cc   Will order US to assess for hydronephrosis resolution     Viktor Durant   PGY5 URO

## 2023-02-19 NOTE — CARE COORDINATION
Case Management Assessment  Initial Evaluation    Date/Time of Evaluation: 2/19/2023 8:48 AM  Assessment Completed by: Aádn Haque    If patient is discharged prior to next notation, then this note serves as note for discharge by case management. Patient Name: Antony Kumar                   YOB: 1965  Diagnosis: Urinary retention [R33.9]  Acute urinary retention [R33.8]                   Date / Time: 2/18/2023 12:24 PM    Patient Admission Status: Inpatient   Readmission Risk (Low < 19, Mod (19-27), High > 27): Readmission Risk Score: 8.3    Current PCP: Shan Jeffrey MD  PCP verified by CM? No (does not have PCP. List provided.)    History Provided by:    Patient Orientation: Alert and Oriented    Patient Cognition: Alert    Hospitalization in the last 30 days (Readmission):  No    If yes, Readmission Assessment in CM Navigator will be completed. Advance Directives:      Code Status: Full Code   Patient's Primary Decision Maker is: Legal Next of Kin (sister Aamir Pruett)      Discharge Planning:    Patient lives with: Alone Type of Home: Apartment  Primary Care Giver: Self  Patient Support Systems include: None   Current Financial resources: [employer] [place], Medicaid, Medicare  Current community resources: Transportation ([employer] transportation)  Current services prior to admission: None            Current DME:              Type of Home Care services:  None    ADLS  Prior functional level: Independent in ADLs/IADLs  Current functional level: Independent in ADLs/IADLs    PT AM-PAC:   /24  OT AM-PAC:   /24    Family can provide assistance at DC: No  Would you like Case Management to discuss the discharge plan with any other family members/significant others, and if so, who?  No  Plans to Return to Present Housing: Yes  Other Identified Issues/Barriers to RETURNING to current housing: none    Potential Assistance needed at discharge:              Potential DME:    Patient expects to discharge to:    Plan for transportation at discharge: Other (see comment) ([facility])    Financial    Payor: Stefanie Neumann / Plan: Clyde Schmidt / Product Type: *No Product type* /     Does insurance require precert for SNF: Yes    Potential assistance Purchasing Medications: No  Meds-to-Beds request:        [address] [place], [place] [address] [facility] [phone]  [facility] [address]  Phone: [phone] Fax: [phone]      Notes:    Factors facilitating achievement of predicted outcomes: Motivated    Barriers to discharge: No family support    Additional Case Management Notes: Pt lives alone in 1st level apartment. She uses no DME. Plan is to home. She requests assistance at d/c with [employer]. The Plan for Transition of Care is related to the following treatment goals of Urinary retention [R33.9]  Acute urinary retention [W41.4]    IF APPLICABLE: The Patient and/or patient representative Fred Grimes and her family were provided with a choice of provider and agrees with the discharge plan.  Freedom of choice list with basic dialogue that supports the patient's individualized plan of care/goals and shares the quality data associated with the providers was provided to:     Patient Representative Name:       The Patient and/or Patient Representative Agree with the Discharge Plan? yes     Aggie Arndt  Case Management Department  Ph: [phone]

## 2023-02-19 NOTE — PLAN OF CARE
Problem: Pain  Goal: Verbalizes/displays adequate comfort level or baseline comfort level  2/19/2023 1832 by Kiana Mehta RN  Outcome: Progressing  2/19/2023 0542 by Philip Lambert RN  Outcome: Progressing     Problem: Skin/Tissue Integrity  Goal: Absence of new skin breakdown  Description: 1. Monitor for areas of redness and/or skin breakdown  2. Assess vascular access sites hourly  3. Every 4-6 hours minimum:  Change oxygen saturation probe site  4. Every 4-6 hours:  If on nasal continuous positive airway pressure, respiratory therapy assess nares and determine need for appliance change or resting period.   2/19/2023 1832 by Kiana Mehta RN  Outcome: Progressing  2/19/2023 0542 by Philip Lambert RN  Outcome: Progressing     Problem: Safety - Adult  Goal: Free from fall injury  2/19/2023 1832 by Kiana Mehta RN  Outcome: Progressing  2/19/2023 0542 by Philip Lambert RN  Outcome: Progressing     Problem: ABCDS Injury Assessment  Goal: Absence of physical injury  2/19/2023 1832 by Kiana Mehta RN  Outcome: Progressing  2/19/2023 0542 by Philip Lambert RN  Outcome: Progressing
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
## 2023-02-19 NOTE — ED NOTES
Phleb contacted for blood samples. Stated she will be here shortly.      Ricardo Trent RN  02/18/23 2122

## 2023-02-20 LAB
EKG ATRIAL RATE: 119 BPM
EKG P AXIS: 50 DEGREES
EKG P-R INTERVAL: 138 MS
EKG Q-T INTERVAL: 314 MS
EKG QRS DURATION: 74 MS
EKG QTC CALCULATION (BAZETT): 441 MS
EKG R AXIS: -7 DEGREES
EKG T AXIS: -3 DEGREES
EKG VENTRICULAR RATE: 119 BPM
GLUCOSE BLD-MCNC: 202 MG/DL (ref 65–105)
GLUCOSE BLD-MCNC: 257 MG/DL (ref 65–105)
GLUCOSE BLD-MCNC: 259 MG/DL (ref 65–105)
GLUCOSE BLD-MCNC: 278 MG/DL (ref 65–105)
GLUCOSE BLD-MCNC: 314 MG/DL (ref 65–105)

## 2023-02-20 PROCEDURE — G0108 DIAB MANAGE TRN  PER INDIV: HCPCS

## 2023-02-20 PROCEDURE — 6360000002 HC RX W HCPCS: Performed by: INTERNAL MEDICINE

## 2023-02-20 PROCEDURE — 99232 SBSQ HOSP IP/OBS MODERATE 35: CPT | Performed by: INTERNAL MEDICINE

## 2023-02-20 PROCEDURE — 51701 INSERT BLADDER CATHETER: CPT

## 2023-02-20 PROCEDURE — 1200000000 HC SEMI PRIVATE

## 2023-02-20 PROCEDURE — 93005 ELECTROCARDIOGRAM TRACING: CPT | Performed by: INTERNAL MEDICINE

## 2023-02-20 PROCEDURE — 82947 ASSAY GLUCOSE BLOOD QUANT: CPT

## 2023-02-20 PROCEDURE — 2580000003 HC RX 258: Performed by: INTERNAL MEDICINE

## 2023-02-20 PROCEDURE — 51798 US URINE CAPACITY MEASURE: CPT

## 2023-02-20 PROCEDURE — 6370000000 HC RX 637 (ALT 250 FOR IP): Performed by: INTERNAL MEDICINE

## 2023-02-20 RX ORDER — ALOGLIPTIN 12.5 MG/1
25 TABLET, FILM COATED ORAL DAILY
Status: DISCONTINUED | OUTPATIENT
Start: 2023-02-20 | End: 2023-02-21 | Stop reason: HOSPADM

## 2023-02-20 RX ORDER — METOPROLOL TARTRATE 5 MG/5ML
5 INJECTION INTRAVENOUS EVERY 6 HOURS PRN
Status: DISCONTINUED | OUTPATIENT
Start: 2023-02-20 | End: 2023-02-21 | Stop reason: HOSPADM

## 2023-02-20 RX ORDER — GLUCOSAMINE HCL/CHONDROITIN SU 500-400 MG
CAPSULE ORAL
Qty: 100 STRIP | Refills: 0 | Status: SHIPPED | OUTPATIENT
Start: 2023-02-20

## 2023-02-20 RX ORDER — LANCETS 30 GAUGE
1 EACH MISCELLANEOUS 2 TIMES DAILY
Qty: 100 EACH | Refills: 5 | Status: SHIPPED | OUTPATIENT
Start: 2023-02-20

## 2023-02-20 RX ORDER — AMLODIPINE BESYLATE AND BENAZEPRIL HYDROCHLORIDE 10; 20 MG/1; MG/1
1 CAPSULE ORAL DAILY
Status: DISCONTINUED | OUTPATIENT
Start: 2023-02-20 | End: 2023-02-20

## 2023-02-20 RX ORDER — LISINOPRIL 20 MG/1
20 TABLET ORAL DAILY
Status: DISCONTINUED | OUTPATIENT
Start: 2023-02-20 | End: 2023-02-21 | Stop reason: HOSPADM

## 2023-02-20 RX ORDER — ALOGLIPTIN 25 MG/1
25 TABLET, FILM COATED ORAL DAILY
Qty: 30 TABLET | Refills: 1 | Status: SHIPPED | OUTPATIENT
Start: 2023-02-21

## 2023-02-20 RX ORDER — AMLODIPINE BESYLATE 10 MG/1
10 TABLET ORAL DAILY
Status: DISCONTINUED | OUTPATIENT
Start: 2023-02-20 | End: 2023-02-21 | Stop reason: HOSPADM

## 2023-02-20 RX ADMIN — PROPRANOLOL HYDROCHLORIDE 10 MG: 10 TABLET ORAL at 08:30

## 2023-02-20 RX ADMIN — ALPRAZOLAM 1 MG: 1 TABLET ORAL at 08:30

## 2023-02-20 RX ADMIN — INSULIN LISPRO 2 UNITS: 100 INJECTION, SOLUTION INTRAVENOUS; SUBCUTANEOUS at 16:23

## 2023-02-20 RX ADMIN — SODIUM CHLORIDE, PRESERVATIVE FREE 10 ML: 5 INJECTION INTRAVENOUS at 08:31

## 2023-02-20 RX ADMIN — ALOGLIPTIN 25 MG: 12.5 TABLET, FILM COATED ORAL at 10:13

## 2023-02-20 RX ADMIN — HYDROCODONE BITARTRATE AND ACETAMINOPHEN 1 TABLET: 5; 325 TABLET ORAL at 22:00

## 2023-02-20 RX ADMIN — LISINOPRIL 20 MG: 20 TABLET ORAL at 10:13

## 2023-02-20 RX ADMIN — AMLODIPINE BESYLATE 10 MG: 10 TABLET ORAL at 10:12

## 2023-02-20 RX ADMIN — INSULIN LISPRO 1 UNITS: 100 INJECTION, SOLUTION INTRAVENOUS; SUBCUTANEOUS at 13:04

## 2023-02-20 RX ADMIN — INSULIN LISPRO 4 UNITS: 100 INJECTION, SOLUTION INTRAVENOUS; SUBCUTANEOUS at 20:41

## 2023-02-20 RX ADMIN — GABAPENTIN 100 MG: 100 CAPSULE ORAL at 20:30

## 2023-02-20 RX ADMIN — GABAPENTIN 100 MG: 100 CAPSULE ORAL at 08:31

## 2023-02-20 RX ADMIN — ENOXAPARIN SODIUM 40 MG: 100 INJECTION SUBCUTANEOUS at 08:30

## 2023-02-20 RX ADMIN — PROPRANOLOL HYDROCHLORIDE 10 MG: 10 TABLET ORAL at 20:28

## 2023-02-20 RX ADMIN — TAMSULOSIN HYDROCHLORIDE 0.4 MG: 0.4 CAPSULE ORAL at 08:30

## 2023-02-20 RX ADMIN — INSULIN LISPRO 2 UNITS: 100 INJECTION, SOLUTION INTRAVENOUS; SUBCUTANEOUS at 10:10

## 2023-02-20 RX ADMIN — HYDROCODONE BITARTRATE AND ACETAMINOPHEN 1 TABLET: 5; 325 TABLET ORAL at 08:30

## 2023-02-20 RX ADMIN — HYDROCODONE BITARTRATE AND ACETAMINOPHEN 1 TABLET: 5; 325 TABLET ORAL at 15:47

## 2023-02-20 ASSESSMENT — PAIN DESCRIPTION - ONSET
ONSET: SUDDEN
ONSET: ON-GOING

## 2023-02-20 ASSESSMENT — PAIN DESCRIPTION - DESCRIPTORS
DESCRIPTORS: DISCOMFORT
DESCRIPTORS: ACHING
DESCRIPTORS: SHARP

## 2023-02-20 ASSESSMENT — PAIN DESCRIPTION - LOCATION
LOCATION: BACK

## 2023-02-20 ASSESSMENT — PAIN SCALES - GENERAL
PAINLEVEL_OUTOF10: 7
PAINLEVEL_OUTOF10: 7
PAINLEVEL_OUTOF10: 5
PAINLEVEL_OUTOF10: 4
PAINLEVEL_OUTOF10: 7
PAINLEVEL_OUTOF10: 2

## 2023-02-20 ASSESSMENT — PAIN DESCRIPTION - ORIENTATION
ORIENTATION: RIGHT;LEFT;LOWER
ORIENTATION: LOWER;RIGHT;LEFT

## 2023-02-20 ASSESSMENT — PAIN DESCRIPTION - FREQUENCY: FREQUENCY: INTERMITTENT

## 2023-02-20 ASSESSMENT — PAIN DESCRIPTION - PAIN TYPE
TYPE: ACUTE PAIN
TYPE: ACUTE PAIN

## 2023-02-20 ASSESSMENT — PAIN - FUNCTIONAL ASSESSMENT
PAIN_FUNCTIONAL_ASSESSMENT: PREVENTS OR INTERFERES SOME ACTIVE ACTIVITIES AND ADLS
PAIN_FUNCTIONAL_ASSESSMENT: ACTIVITIES ARE NOT PREVENTED
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
## 2023-02-20 NOTE — PROGRESS NOTES
[facility]  Office: [address], DO, Evelyne Males, DO, Dusty Loop, DO, Mele Pena Blood, DO, Freddy Coats MD, Matthew Silveira MD, Tai Mariano MD, Qi Squires MD,  Claire Lozano MD, Irasema Gomez MD, Dorothea Reno, DO, Susan Ochoa MD,  Pastor Franks MD, Columba Juarez MD, Israel Kim, DO, Moises Gruber MD, Paco Aguilera MD, Jerry Donaldson, DO, Vianey Castle MD, Guy Webster MD, Sheron Chaney MD, Kristi Lew MD, Isaura Hawthorne, DO, Sami Pagan MD, Suly Tsai MD, Pina Langford, Jennifer Maier, CNP, Godwin Lema, CNP, Swetha Naranjo, CNP,  Hero Cochran, [facility], Lupe Garcias, CNP, Fernand Cockayne, CNP, Yissel Parker, CNP, Albert Alonzo, CNP, Emiliano Becker, CNP, HORACIO PowersC, Leanna Salamanca, CNS, Galen Graham, CNP, Yoseph Holden, [facility]    Progress Note    2/20/2023    10:30 AM    Name:   Lela Rodrigez  MRN:     [id number]     Acct:      [de-identified]   Room:   [address] [place] [address] Day:  2  Admit Date:  2/18/2023 12:24 PM    PCP:   Rodo Jeffries MD  Code Status:  Full Code    Subjective:     C/C:   Chief Complaint   Patient presents with    Urinary Retention     Starting last night      Interval History Status: improved. Patient continues have low back pain. Recurrent episode of retention lasting requiring straight catheterization, approximate liter removed compliant with catheterization. Denies any chest pain, shortness of breath, nausea vomiting, fevers or chills. Brief History: This is a 53-year-old female who presents with a complaint of inability to urinate. She apparently has not been able to urinate for several hours and presented to the emergency room with increasing pain and was found to have acute retention of greater than 1 L urine.   Medina catheter has been placed and urology consult    Review of Systems: Constitutional:  negative for chills, fevers, sweats  Respiratory:  negative for cough, dyspnea on exertion, shortness of breath, wheezing  Cardiovascular:  negative for chest pain, chest pressure/discomfort, lower extremity edema, palpitations  Gastrointestinal:  negative for abdominal pain, constipation, diarrhea, nausea, vomiting  Neurological:  negative for dizziness, headache    Medications: Allergies: Allergies   Allergen Reactions    Motrin [Ibuprofen Micronized] Swelling     States sometimes it swells her eyes up       Current Meds:   Scheduled Meds:    amLODIPine  10 mg Oral Daily    And    lisinopril  20 mg Oral Daily    alogliptin  25 mg Oral Daily    ALPRAZolam  1 mg Oral Daily    gabapentin  100 mg Oral BID    lurasidone  80 mg Oral Daily    propranolol  10 mg Oral BID    tamsulosin  0.4 mg Oral Daily    sodium chloride flush  5-40 mL IntraVENous 2 times per day    enoxaparin  40 mg SubCUTAneous Daily    insulin lispro  0-4 Units SubCUTAneous TID WC    insulin lispro  0-4 Units SubCUTAneous Nightly     Continuous Infusions:    sodium chloride      sodium chloride 50 mL/hr at 02/19/23 1858    dextrose       PRN Meds: metoprolol, sodium chloride flush, sodium chloride, potassium chloride **OR** potassium alternative oral replacement **OR** potassium chloride, ondansetron **OR** ondansetron, polyethylene glycol, bisacodyl, acetaminophen **OR** acetaminophen, glucose, dextrose bolus **OR** dextrose bolus, glucagon (rDNA), dextrose, HYDROcodone 5 mg - acetaminophen    Data:     Past Medical History:   has a past medical history of Anxiety, Chronic back pain, Hypertension, and Mitral valve prolapse. Social History:   reports that she has never smoked. She does not have any smokeless tobacco history on file. She reports that she does not drink alcohol and does not use drugs.      Family History:   Family History   Problem Relation Age of Onset    Heart Disease Mother        Vitals:  BP (!) 136/99 Pulse (!) 114   Temp 98.9 °F (37.2 °C) (Oral)   Resp 17   Ht 5' 2\" (1.575 m)   Wt 163 lb 2.3 oz (74 kg)   SpO2 98%   BMI 29.84 kg/m²   Temp (24hrs), Av.9 °F (37.2 °C), Min:98.6 °F (37 °C), Max:99.2 °F (37.3 °C)    Recent Labs     23  0828   POCGLU 269* 299* 302* 278*       I/O (24Hr): Intake/Output Summary (Last 24 hours) at 2023 1030  Last data filed at 2023 2330  Gross per 24 hour   Intake 1316.61 ml   Output 1908 ml   Net -591.39 ml       Labs:  Hematology:  Recent Labs     23  0632   WBC 14.8* 11.0   RBC 4.51 3.91*   HGB 12.6 10.9*   HCT 38.5 34.0*   MCV 85.4 87.0   MCH 27.9 27.9   MCHC 32.7 32.1   RDW 13.5 13.5    323   MPV 10.4 10.3     Chemistry:  Recent Labs     23  0632   * 133* 137   K 5.1 4.2 4.0   CL 93* 102 103   CO2 * 19* 20   GLUCOSE 383*  --  322*   BUN 27*  --  13   CREATININE 1.60*  --  0.56   MG 1.9  --   --    ANIONGAP 18* 12 14   LABGLOM 37*  --  >60   CALCIUM 9.0  --  8.5*     Recent Labs     23  0857 23  1221 23  0828   LABA1C 10.7*  --   --   --   --   --   --   --    TSH 0.71  --   --   --   --   --   --   --    POCGLU  --    < > 286* 265* 269* 299* 302* 278*    < > = values in this interval not displayed. ABG:  Lab Results   Component Value Date/Time    FIO2 INFORMATION NOT PROVIDED 2023 03:39 PM     Lab Results   Component Value Date/Time    SPECIAL RT HAND 11ML 2023 07:35 PM     Lab Results   Component Value Date/Time    CULTURE NO GROWTH 1 DAY 2023 07:35 PM       Radiology:  CT HEAD WO CONTRAST    Result Date: 2023  No acute abnormality of the head and cervical spine. CT CERVICAL SPINE WO CONTRAST    Result Date: 2023  No acute abnormality of the head and cervical spine.      US RENAL COMPLETE    Result Date: 2/19/2023  1. Mild left-sided hydronephrosis. Enlarged left renal pelvis measuring 1.5 cm. 2. No right-sided hydronephrosis. 3. Thickening of the posterior urinary bladder wall measuring 1.4 cm which is nonspecific and can be seen with cystitis. Correlate with urinalysis. Chronic bladder outlet obstruction, neoplasm/malignancy also a differential considerations. 4. Bilateral ureteral jets visualized. Patient had no urge to void during the exam. RECOMMENDATIONS: Unavailable     CT CHEST ABDOMEN PELVIS WO CONTRAST Additional Contrast? None    Result Date: 2/19/2023  No post-traumatic findings within the chest, abdomen, and pelvis or within the thoracolumbar spine. Mild dilatation of the renal collecting systems could be due to reported history of urinary retention and a Medina catheter has been placed. CT LUMBAR SPINE TRAUMA RECONSTRUCTION    Result Date: 2/19/2023  No post-traumatic findings within the chest, abdomen, and pelvis or within the thoracolumbar spine. Mild dilatation of the renal collecting systems could be due to reported history of urinary retention and a Medina catheter has been placed. CT THORACIC SPINE TRAUMA RECONSTRUCTION    Result Date: 2/19/2023  No post-traumatic findings within the chest, abdomen, and pelvis or within the thoracolumbar spine. Mild dilatation of the renal collecting systems could be due to reported history of urinary retention and a Medina catheter has been placed.        Physical Examination:        General appearance:  alert, cooperative and no distress  Mental Status:  oriented to person, place and time and normal affect  Lungs:  clear to auscultation bilaterally, normal effort  Heart:  regular rate and rhythm, no murmur  Abdomen:  soft, nontender, nondistended, normal bowel sounds, no masses, hepatomegaly, splenomegaly  Extremities:  no edema, redness, tenderness in the calves  Skin:  no gross lesions, rashes, induration    Assessment:        Hospital Problems            Last Modified POA    * (Principal) Acute urinary retention 2/18/2023 Yes    Primary hypertension 2/18/2023 Yes    Mitral valve prolapse 2/18/2023 Yes    Anxiety 2/18/2023 Yes    New onset type 2 diabetes mellitus (HCC) 2/20/2023 Yes    Hyponatremia 2/18/2023 Yes    GUERO (acute kidney injury) (HCC) 2/18/2023 Yes    Bilateral hydronephrosis 2/19/2023 Yes       Plan:        Resume home dose of Lotrel now that the kidney functions recovered  Continue Inderal  Start alogliptin for new onset diabetes  We will need outpatient eye exam and close follow-up for diabetes with PCP  Straight cath per urology recommendations, had second episode of large volume retention, defer to urology whether indwelling catheter may be appropriate for short-term versus intermittent straight cath at home  PT and OT  Continue efforts for pain control  Diabetic education, initiate here, continue follow-up outpatient    Todd Smith DO  2/20/2023  10:30 AM

## 2023-02-20 NOTE — PROGRESS NOTES
Inpatient Diabetes Education    Rafa Gipson was seen for evaluation and education on new diagnosis of Type 2 diabetes. Patient denies a family history of diabetes and denies that she has ever been told that she has an elevated blood sugar prior to this admission. Lab Results   Component Value Date    LABA1C 10.7 (H) 02/18/2023       Following Survival Skills education topics were covered as appropriate to patient plan of for care:  __X_ Type 2 Diabetes diagnosis as chronic and progressive  __X_ Healthy eating - CHO food groups and need for CHO controlled diet. Elimination of sugary beverages, avoid skipping meal  ___ Role of physical activity - bouts of walking, swimming or low impact aerobics as recommended by care providers- aim for 30 minutes per day  __X_ Blood Glucose Self-Monitoring ( BGSM)  /how to use a BG meter - Demonstrated use of a glucometer  _X__ Blood sugar targets Pre meal 80 - 130 and 2 hours post meal < 180  ___ Hyperglycemia  ( BG over 250) signs and symptoms and treatment   ___ Sick Day Care  ___ Hypoglycemia( BG < than 70) signs and symptoms and treatment \"Rule of 15\"  ___ Keep BG log and take log and meter to follow up HCP appointments  __X_ Medications -  Insulin - patient states that she understands that she has been getting insulin during this hospitalization. Basis teaching about insulin done. Showed syringe vs pen  __X_ Medications - Orals - per progress notes - alogliptin     Following Support materials were provided for patient to take home:  __X_ Educational Booklets as available \"Diabetes and you\"  __X_ Be safe with [place] teaching sheet /  [employer]  __X_ Maddi Mora Diabetes Education brochure or contact card    Patient verbalizes understanding  of survival skills education. Spoke with patient's nurse, Byron Amaya. I let her know that I saw Luis A Nascimento and that she could benefit from support in finding a new PCP.      I will continue to follow Moy's case and continue to work on [employer] education with her. See check list above to see what topics have been covered so far. RECOMMENDATIONS FOR OUTPATIENT PLAN:    Diabetes Self-Monitoring Supplies:  __X_ Preferred / formulary blood glucose meter for BGSM at home use    __X_ Strips and lancets for daily - bid frequency of home BGSM      Diabetes Education / HCP follow -up:   _x_ Would recommend follow -up education at outpatient diabetes education at [facility]. An ordered is needed for this service and can be placed via EHR. Discharge Navigator --- Med Reconciliation -- New orders for discharge tab -- search diabetic ed - REF20 -  [facility]  - review and sign     __X_ Follow -up with HCP / PCP within one week. - Patient states that she is in need of new PCP - I relayed this to her nurse.     DL GAN RN

## 2023-02-20 NOTE — CARE COORDINATION
Patient plans on returning home at Discharge. She is currently needing straight cathed for greater than 400. Awaiting Urology recommendation on morocho vs Straight cath for home.

## 2023-02-21 VITALS
HEIGHT: 62 IN | SYSTOLIC BLOOD PRESSURE: 105 MMHG | RESPIRATION RATE: 16 BRPM | DIASTOLIC BLOOD PRESSURE: 77 MMHG | HEART RATE: 107 BPM | WEIGHT: 163.14 LBS | OXYGEN SATURATION: 96 % | TEMPERATURE: 98.4 F | BODY MASS INDEX: 30.02 KG/M2

## 2023-02-21 LAB
EKG ATRIAL RATE: 113 BPM
EKG P AXIS: 43 DEGREES
EKG P-R INTERVAL: 136 MS
EKG Q-T INTERVAL: 292 MS
EKG QRS DURATION: 72 MS
EKG QTC CALCULATION (BAZETT): 400 MS
EKG R AXIS: -7 DEGREES
EKG T AXIS: -14 DEGREES
EKG VENTRICULAR RATE: 113 BPM
GLUCOSE BLD-MCNC: 176 MG/DL (ref 65–105)
GLUCOSE BLD-MCNC: 255 MG/DL (ref 65–105)

## 2023-02-21 PROCEDURE — 51701 INSERT BLADDER CATHETER: CPT

## 2023-02-21 PROCEDURE — 51798 US URINE CAPACITY MEASURE: CPT

## 2023-02-21 PROCEDURE — G0108 DIAB MANAGE TRN  PER INDIV: HCPCS

## 2023-02-21 PROCEDURE — 6370000000 HC RX 637 (ALT 250 FOR IP): Performed by: INTERNAL MEDICINE

## 2023-02-21 PROCEDURE — 82947 ASSAY GLUCOSE BLOOD QUANT: CPT

## 2023-02-21 PROCEDURE — 99239 HOSP IP/OBS DSCHRG MGMT >30: CPT | Performed by: STUDENT IN AN ORGANIZED HEALTH CARE EDUCATION/TRAINING PROGRAM

## 2023-02-21 PROCEDURE — 6360000002 HC RX W HCPCS: Performed by: INTERNAL MEDICINE

## 2023-02-21 RX ORDER — CATHETER 10 FR
1 EACH MISCELLANEOUS EVERY 4 HOURS
Qty: 5 EACH | Refills: 2 | Status: SHIPPED | OUTPATIENT
Start: 2023-02-21

## 2023-02-21 RX ORDER — OXYCODONE HYDROCHLORIDE AND ACETAMINOPHEN 5; 325 MG/1; MG/1
1 TABLET ORAL EVERY 6 HOURS PRN
Qty: 5 TABLET | Refills: 0 | Status: SHIPPED | OUTPATIENT
Start: 2023-02-21 | End: 2023-02-28

## 2023-02-21 RX ORDER — LANCETS 30 GAUGE
1 EACH MISCELLANEOUS DAILY
Qty: 100 EACH | Refills: 3 | Status: SHIPPED | OUTPATIENT
Start: 2023-02-21

## 2023-02-21 RX ORDER — GLUCOSAMINE HCL/CHONDROITIN SU 500-400 MG
CAPSULE ORAL
Qty: 100 STRIP | Refills: 0 | Status: SHIPPED | OUTPATIENT
Start: 2023-02-21

## 2023-02-21 RX ORDER — BLOOD-GLUCOSE METER
1 KIT MISCELLANEOUS DAILY
Qty: 1 KIT | Refills: 0 | Status: SHIPPED | OUTPATIENT
Start: 2023-02-21

## 2023-02-21 RX ORDER — TAMSULOSIN HYDROCHLORIDE 0.4 MG/1
0.4 CAPSULE ORAL DAILY
Qty: 30 CAPSULE | Refills: 3 | Status: SHIPPED | OUTPATIENT
Start: 2023-02-22

## 2023-02-21 RX ADMIN — INSULIN LISPRO 3 UNITS: 100 INJECTION, SOLUTION INTRAVENOUS; SUBCUTANEOUS at 09:27

## 2023-02-21 RX ADMIN — HYDROCODONE BITARTRATE AND ACETAMINOPHEN 1 TABLET: 5; 325 TABLET ORAL at 15:24

## 2023-02-21 RX ADMIN — ENOXAPARIN SODIUM 40 MG: 100 INJECTION SUBCUTANEOUS at 09:25

## 2023-02-21 RX ADMIN — LISINOPRIL 20 MG: 20 TABLET ORAL at 12:10

## 2023-02-21 RX ADMIN — ALOGLIPTIN 25 MG: 12.5 TABLET, FILM COATED ORAL at 09:25

## 2023-02-21 RX ADMIN — HYDROCODONE BITARTRATE AND ACETAMINOPHEN 1 TABLET: 5; 325 TABLET ORAL at 09:24

## 2023-02-21 RX ADMIN — TAMSULOSIN HYDROCHLORIDE 0.4 MG: 0.4 CAPSULE ORAL at 09:25

## 2023-02-21 RX ADMIN — AMLODIPINE BESYLATE 10 MG: 10 TABLET ORAL at 09:25

## 2023-02-21 RX ADMIN — INSULIN LISPRO 2 UNITS: 100 INJECTION, SOLUTION INTRAVENOUS; SUBCUTANEOUS at 13:53

## 2023-02-21 RX ADMIN — HYDROCODONE BITARTRATE AND ACETAMINOPHEN 1 TABLET: 5; 325 TABLET ORAL at 04:33

## 2023-02-21 RX ADMIN — ALPRAZOLAM 1 MG: 1 TABLET ORAL at 09:25

## 2023-02-21 RX ADMIN — PROPRANOLOL HYDROCHLORIDE 10 MG: 10 TABLET ORAL at 09:25

## 2023-02-21 RX ADMIN — LURASIDONE HYDROCHLORIDE 80 MG: 40 TABLET, FILM COATED ORAL at 09:25

## 2023-02-21 RX ADMIN — GABAPENTIN 100 MG: 100 CAPSULE ORAL at 09:24

## 2023-02-21 ASSESSMENT — ENCOUNTER SYMPTOMS
COLOR CHANGE: 0
EYE DISCHARGE: 0
APNEA: 0
ABDOMINAL PAIN: 0
FACIAL SWELLING: 0
CHEST TIGHTNESS: 0
BACK PAIN: 0
CONSTIPATION: 0
DIARRHEA: 0
ABDOMINAL DISTENTION: 0
EYE ITCHING: 0

## 2023-02-21 ASSESSMENT — PAIN SCALES - GENERAL
PAINLEVEL_OUTOF10: 7
PAINLEVEL_OUTOF10: 8
PAINLEVEL_OUTOF10: 8

## 2023-02-21 ASSESSMENT — PAIN DESCRIPTION - LOCATION: LOCATION: BACK

## 2023-02-21 ASSESSMENT — PAIN DESCRIPTION - DESCRIPTORS: DESCRIPTORS: ACHING

## 2023-02-21 NOTE — PROGRESS NOTES
[facility]  Office: [address], DO, Radhashadia Parker, DO, Steven Jessica, DO, Judy Everett, DO, Kevin Montero MD, Elidia Miller MD, Pamela Santoyo MD, James Orozco MD,  Mariia Lange MD, Nick Zuniga MD, Alvarado Rutledge, DO, Viky Hernandes MD,  Ivan Guzmán MD, Pina Jones MD, Carlyn Sevilla, DO, Ryder Nava MD, Pb Narvaez MD, French Shafer, DO, Leila Grover MD, Tye Barcenas MD, Oliver Beltre MD, Rod Paez MD, Jerson Noriega DO, Ailyn Avila MD, Rafi Groves MD, Romayne Muscat, Alberto Mehta, CNP, Adarsh Sandoval, CNP, Bennett Blas, CNP,  Alea Marrero, [facility], Anthony Chamorro, CNP, Darline Abbott, CNP, Mariel Perkins, CNP, Margarita Buchanan, CNP, Caroline Anne, CNP, Lulú Mariano PATonoC, Steve Cantu, [facility], Karsten Claros, CNP, Bertin King, [facility]    Progress Note    2/21/2023    11:14 AM    Name:   Radhames Garibay  MRN:     [id number]     [place]:      [de-identified]   Room:   [address] [place] [address] Day:  3  Admit Date:  2/18/2023 12:24 PM    PCP:   Linda Lozada MD  Code Status:  Full Code    Subjective:     C/C:   Chief Complaint   Patient presents with    Urinary Retention     Starting last night      Interval History Status: not changed. Patient seen examined at bedside. Had been having elevated bladder scans prior to postvoid residual being measured. Some confusion over urine retention this morning and required Medina placement. Did discuss with patient not interested in Medina catheter at this point but would be willing to try intermittent straight cath. Brief History:     48-year-old female past medical history of back pain, hypertension, mitral valve prolapse, anxiety found to have new onset diabetes type 2 acute urinary retention and bilateral hydronephrosis requiring Medina care.   Did discuss with urology plan to try intermittent catheterization and patient to follow-up with urology as outpatient. Patient was started on diabetes mellitus medications and to follow-up with diabetes education as outpatient. Glucometer lancets and test strips were ordered for patient. Patient also encouraged to follow-up with pain management as outpatient. Review of Systems:     Review of Systems   Constitutional:  Negative for activity change, appetite change, chills and fever. HENT:  Negative for congestion, ear pain, facial swelling and mouth sores. Eyes:  Negative for discharge and itching. Respiratory:  Negative for apnea and chest tightness. Cardiovascular:  Negative for chest pain and leg swelling. Gastrointestinal:  Negative for abdominal distention, abdominal pain, constipation and diarrhea. Endocrine: Negative for cold intolerance, polyphagia and polyuria. Genitourinary:  Negative for difficulty urinating, dysuria and flank pain. Musculoskeletal:  Negative for arthralgias, back pain and joint swelling. Skin:  Negative for color change and wound. Neurological:  Negative for dizziness, seizures, light-headedness and headaches. Psychiatric/Behavioral:  Negative for agitation, behavioral problems and self-injury. Medications: Allergies:     Allergies   Allergen Reactions    Motrin [Ibuprofen Micronized] Swelling     States sometimes it swells her eyes up       Current Meds:   Scheduled Meds:    amLODIPine  10 mg Oral Daily    And    lisinopril  20 mg Oral Daily    alogliptin  25 mg Oral Daily    ALPRAZolam  1 mg Oral Daily    gabapentin  100 mg Oral BID    lurasidone  80 mg Oral Daily    propranolol  10 mg Oral BID    tamsulosin  0.4 mg Oral Daily    sodium chloride flush  5-40 mL IntraVENous 2 times per day    enoxaparin  40 mg SubCUTAneous Daily    insulin lispro  0-4 Units SubCUTAneous TID WC    insulin lispro  0-4 Units SubCUTAneous Nightly     Continuous Infusions:    sodium chloride      dextrose       PRN Meds: metoprolol, sodium chloride flush, sodium chloride, potassium chloride **OR** potassium alternative oral replacement **OR** potassium chloride, ondansetron **OR** ondansetron, polyethylene glycol, bisacodyl, acetaminophen **OR** acetaminophen, glucose, dextrose bolus **OR** dextrose bolus, glucagon (rDNA), dextrose, HYDROcodone 5 mg - acetaminophen    Data:     Past Medical History:   has a past medical history of Anxiety, Chronic back pain, Hypertension, and Mitral valve prolapse.    Social History:   reports that she has never smoked. She does not have any smokeless tobacco history on file. She reports that she does not drink alcohol and does not use drugs.     Family History:   Family History   Problem Relation Age of Onset    Heart Disease Mother        Vitals:  BP (!) 128/93   Pulse (!) 101   Temp 97.9 °F (36.6 °C) (Oral)   Resp 16   Ht 5' 2\" (1.575 m)   Wt 163 lb 2.3 oz (74 kg)   SpO2 99%   BMI 29.84 kg/m²   Temp (24hrs), Av.1 °F (36.7 °C), Min:97.9 °F (36.6 °C), Max:98.2 °F (36.8 °C)    Recent Labs     23  1241 23  1549 23  1658 23  2032   POCGLU 202* 259* 257* 314*       I/O (24Hr):    Intake/Output Summary (Last 24 hours) at 2023 1114  Last data filed at 2023 0629  Gross per 24 hour   Intake 2923.47 ml   Output 3418 ml   Net -494.53 ml       Labs:  Hematology:  Recent Labs     23  1321 23  0632   WBC 14.8* 11.0   RBC 4.51 3.91*   HGB 12.6 10.9*   HCT 38.5 34.0*   MCV 85.4 87.0   MCH 27.9 27.9   MCHC 32.7 32.1   RDW 13.5 13.5    323   MPV 10.4 10.3     Chemistry:  Recent Labs     23  1321 23  2147 23  0632   * 133* 137   K 5.1 4.2 4.0   CL 93* 102 103   CO2 19* 19* 20   GLUCOSE 383*  --  322*   BUN 27*  --  13   CREATININE 1.60*  --  0.56   MG 1.9  --   --    ANIONGAP 18* 12 14   LABGLOM 37*  --  >60   CALCIUM 9.0  --  8.5*     Recent Labs     23  1321 23  2024 23  2106 23  0828 23  1241 23  1549  02/20/23  1658 02/20/23 2032   LABA1C 10.7*  --   --   --   --   --   --   --    TSH 0.71  --   --   --   --   --   --   --    POCGLU  --    < > 302* 278* 202* 259* 257* 314*    < > = values in this interval not displayed. ABG:  Lab Results   Component Value Date/Time    FIO2 INFORMATION NOT PROVIDED 02/18/2023 03:39 PM     Lab Results   Component Value Date/Time    SPECIAL RT HAND 11ML 02/18/2023 07:35 PM     Lab Results   Component Value Date/Time    CULTURE NO GROWTH 2 DAYS 02/18/2023 07:35 PM       Radiology:  CT HEAD WO CONTRAST    Result Date: 2/18/2023  No acute abnormality of the head and cervical spine. CT CERVICAL SPINE WO CONTRAST    Result Date: 2/18/2023  No acute abnormality of the head and cervical spine. US RENAL COMPLETE    Result Date: 2/19/2023  1. Mild left-sided hydronephrosis. Enlarged left renal pelvis measuring 1.5 cm. 2. No right-sided hydronephrosis. 3. Thickening of the posterior urinary bladder wall measuring 1.4 cm which is nonspecific and can be seen with cystitis. Correlate with urinalysis. Chronic bladder outlet obstruction, neoplasm/malignancy also a differential considerations. 4. Bilateral ureteral jets visualized. Patient had no urge to void during the exam. RECOMMENDATIONS: Unavailable     CT CHEST ABDOMEN PELVIS WO CONTRAST Additional Contrast? None    Result Date: 2/19/2023  No post-traumatic findings within the chest, abdomen, and pelvis or within the thoracolumbar spine. Mild dilatation of the renal collecting systems could be due to reported history of urinary retention and a Medina catheter has been placed. CT LUMBAR SPINE TRAUMA RECONSTRUCTION    Result Date: 2/19/2023  No post-traumatic findings within the chest, abdomen, and pelvis or within the thoracolumbar spine. Mild dilatation of the renal collecting systems could be due to reported history of urinary retention and a Medina catheter has been placed.      CT THORACIC SPINE TRAUMA RECONSTRUCTION    Result Date: 2/19/2023  No post-traumatic findings within the chest, abdomen, and pelvis or within the thoracolumbar spine. Mild dilatation of the renal collecting systems could be due to reported history of urinary retention and a Medina catheter has been placed. Physical Examination:        Physical Exam  Constitutional:       Appearance: She is obese. She is not ill-appearing. HENT:      Head: Normocephalic. Right Ear: External ear normal.      Left Ear: External ear normal.      Nose: Nose normal.      Mouth/Throat:      Mouth: Mucous membranes are moist.   Eyes:      Pupils: Pupils are equal, round, and reactive to light. Cardiovascular:      Rate and Rhythm: Normal rate and regular rhythm. Pulses: Normal pulses. Heart sounds: No murmur heard. Pulmonary:      Effort: Pulmonary effort is normal.   Abdominal:      General: There is no distension. Palpations: Abdomen is soft. Tenderness: There is no right CVA tenderness or left CVA tenderness. Musculoskeletal:      Cervical back: Neck supple. Right lower leg: No edema. Left lower leg: No edema. Skin:     General: Skin is warm and dry. Capillary Refill: Capillary refill takes less than 2 seconds. Neurological:      General: No focal deficit present. Mental Status: She is alert and oriented to person, place, and time. Psychiatric:         Mood and Affect: Mood normal.         Behavior: Behavior normal.       Assessment:        Hospital Problems             Last Modified POA    * (Principal) Acute urinary retention 2/18/2023 Yes    Primary hypertension 2/18/2023 Yes    Mitral valve prolapse 2/18/2023 Yes    Anxiety 2/18/2023 Yes    New onset type 2 diabetes mellitus ([address] 2/20/2023 Yes    Hyponatremia 2/18/2023 Yes    GUERO (acute kidney injury) ([address] 2/18/2023 Yes    Bilateral hydronephrosis 2/19/2023 Yes       Plan:        Discussed with patient.   She is not interested in keeping a Medina catheter and wants it removed but is willing to try intermittent catheter. Diabetes type 2 continue oral hypoglycemics appreciate diabetes educationAssistance  Did discuss with urology appreciate assistance  Discharge home with home care order placed. Encourage patient to follow-up with pain management as outpatient.     Kyree Palafox MD  2/21/2023  11:14 AM

## 2023-02-21 NOTE — PROGRESS NOTES
CLINICAL PHARMACY NOTE: MEDS TO BEDS    Total # of Prescriptions Filled: 1   The following medications were delivered to the patient:  flomax    Additional Documentation:

## 2023-02-21 NOTE — DISCHARGE SUMMARY
[facility]  Office: [address], DO, Sri Jefferson, DO, Cata Lu, DO, Kristie Weber Blood, DO, Loco Manriquez MD, Chandler Torres MD, Viridiana Pretty MD, Mohini Sharp MD,  Mirza Julian MD, Rodney Valenzuela MD, Tyler Avendaño, DO, Jeramy Mitchell MD,  Can Pennington, DO, Cassidy Merida MD, Kevan Napoles MD, Jameson Dawn, DO, Isaías Daniel MD, Robyn Jimenez MD, Tyrell Wheeler DO, Shakeel Kim MD, Jacob Siegel MD, Kat Mark MD, Keke Winters MD, Teetee Moraes, DO, Fior Whipple MD, Perla Mart MD, Jose Alfredo Carrasco, CNP,  Concepcion Nye, CNP, Chelsy Napier, CNP, Monty Barbosa, CNP,  Latoya Kitchen, [facility], Maegan Murrieta, CNP, Glenn Tinsley, CNP, Alber Cannon, CNP, Fadumo Faith, CNP, Karla Hyman, CNP, Jonny Umaña PA-C, Lisandra Mcdonnell, CNS, Javon Loera, CNP, Brooks Munoz, [facility]    Discharge Summary     Patient ID: China Canada  :  1965   MRN: [id number]     ACCOUNT:  [de-identified]   Patient's PCP: Thuy Toribio MD  Admit Date: 2023   Discharge Date: 2023     Length of Stay: 3  Code Status:  Full Code  Admitting Physician: Jaimee Real DO  Discharge Physician: Jeramy Mitchell MD     Active Discharge Diagnoses:     Hospital Problem Lists:  Principal Problem:    Acute urinary retention  Active Problems:    Primary hypertension    Mitral valve prolapse    Anxiety    New onset type 2 diabetes mellitus [address]    Hyponatremia    GUERO (acute kidney injury) [facility])    Bilateral hydronephrosis  Resolved Problems:    * No resolved hospital problems.  *      Admission Condition:  stable     Discharged Condition: stable    Hospital Stay:     Hospital Course:  China Canada is a 61 y.o. female who was admitted for the management of   Acute urinary retention , presented to ER with Urinary Retention (Starting last night )    54-year-old female past medical history of back pain, hypertension, mitral valve prolapse, anxiety found to have new onset diabetes type 2 acute urinary retention and bilateral hydronephrosis requiring Medina care. Did discuss with urology plan to try intermittent catheterization and patient to follow-up with urology as outpatient. Patient was started on diabetes mellitus medications and to follow-up with diabetes education as outpatient. Glucometer lancets and test strips were ordered for patient. Patient also encouraged to follow-up with pain management as outpatient. Diabetes medications were changed as alogliptin was not covered and changed to metformin. Significant therapeutic interventions: see above    Significant Diagnostic Studies:   Labs / Micro:  CBC:   Lab Results   Component Value Date/Time    WBC 11.0 02/19/2023 06:32 AM    RBC 3.91 02/19/2023 06:32 AM    RBC 4.01 12/12/2011 03:19 AM    HGB 10.9 02/19/2023 06:32 AM    HCT 34.0 02/19/2023 06:32 AM    MCV 87.0 02/19/2023 06:32 AM    MCH 27.9 02/19/2023 06:32 AM    MCHC 32.1 02/19/2023 06:32 AM    RDW 13.5 02/19/2023 06:32 AM     02/19/2023 06:32 AM     12/12/2011 03:19 AM     BMP:    Lab Results   Component Value Date/Time    GLUCOSE 322 02/19/2023 06:32 AM    GLUCOSE 160 12/12/2011 03:19 AM     02/19/2023 06:32 AM    K 4.0 02/19/2023 06:32 AM     02/19/2023 06:32 AM    CO2 20 02/19/2023 06:32 AM    ANIONGAP 14 02/19/2023 06:32 AM    BUN 13 02/19/2023 06:32 AM    CREATININE 0.56 02/19/2023 06:32 AM    BUNCRER NOT REPORTED 12/12/2011 03:19 AM    CALCIUM 8.5 02/19/2023 06:32 AM    LABGLOM >60 02/19/2023 06:32 AM    GFRAA >60 12/12/2011 03:19 AM    GFR      12/12/2011 03:19 AM    GFR      12/12/2011 03:19 AM        Radiology:  CT HEAD WO CONTRAST    Result Date: 2/18/2023  No acute abnormality of the head and cervical spine. CT CERVICAL SPINE WO CONTRAST    Result Date: 2/18/2023  No acute abnormality of the head and cervical spine.      US RENAL COMPLETE    Result Date: 2/19/2023  1. Mild left-sided hydronephrosis. Enlarged left renal pelvis measuring 1.5 cm. 2. No right-sided hydronephrosis. 3. Thickening of the posterior urinary bladder wall measuring 1.4 cm which is nonspecific and can be seen with cystitis. Correlate with urinalysis. Chronic bladder outlet obstruction, neoplasm/malignancy also a differential considerations. 4. Bilateral ureteral jets visualized. Patient had no urge to void during the exam. RECOMMENDATIONS: Unavailable     CT CHEST ABDOMEN PELVIS WO CONTRAST Additional Contrast? None    Result Date: 2/19/2023  No post-traumatic findings within the chest, abdomen, and pelvis or within the thoracolumbar spine. Mild dilatation of the renal collecting systems could be due to reported history of urinary retention and a Medina catheter has been placed. CT LUMBAR SPINE TRAUMA RECONSTRUCTION    Result Date: 2/19/2023  No post-traumatic findings within the chest, abdomen, and pelvis or within the thoracolumbar spine. Mild dilatation of the renal collecting systems could be due to reported history of urinary retention and a Medina catheter has been placed. CT THORACIC SPINE TRAUMA RECONSTRUCTION    Result Date: 2/19/2023  No post-traumatic findings within the chest, abdomen, and pelvis or within the thoracolumbar spine. Mild dilatation of the renal collecting systems could be due to reported history of urinary retention and a Medina catheter has been placed. Consultations:    Consults:     Final Specialist Recommendations/Findings:   IP CONSULT TO HOSPITALIST  IP CONSULT TO UROLOGY  IP CONSULT TO SOCIAL WORK  IP CONSULT TO CASE MANAGEMENT  IP CONSULT TO IV TEAM  IP CONSULT TO DIABETES EDUCATOR  IP CONSULT TO HOME CARE NEEDS      The patient was seen and examined on day of discharge and this discharge summary is in conjunction with any daily progress note from day of discharge.     Discharge plan:     Disposition: Home    Physician Follow Up: Virginia Helm MD  [address], [address] [facility]  [phone]    Follow up in 2 week(s)  for urinary retention    [address] [facility]  [phone]  Follow up  We are happy to see Justin Gregory for additional diabetes education as an outpatient. A referral is needed. Requiring Further Evaluation/Follow Up POST HOSPITALIZATION/Incidental Findings: Follow-up urology, follow-up diabetes education, follow-up PCP, encouraged to follow-up with pain management as outpatient    Diet: diabetic diet    Activity: As tolerated    Instructions to Patient: Follow-up urology, follow-up diabetes education, follow-up PCP, encouraged to follow-up with pain management as outpatient    Discharge Medications:      Medication List        START taking these medications      alogliptin 25 MG Tabs tablet  Commonly known as: NESINA  Take 1 tablet by mouth daily     * blood glucose test strips  Test 2 times a day & as needed for symptoms of irregular blood glucose. Dispense sufficient amount for indicated testing frequency plus additional to accommodate PRN testing needs. * blood glucose test strips  Test 4 times a day & as needed for symptoms of irregular blood glucose. Dispense sufficient amount for indicated testing frequency plus additional to accommodate PRN testing needs. glucose monitoring kit  1 kit by Does not apply route daily     InCare Straight 12FR/20CM Misc  1 Units by Does not apply route every 4 hours Straight cath every 4-6 hours for Urinary retention over 400 mL     * Lancets Misc  1 each by Does not apply route 2 times daily     * Lancets Misc  1 each by Does not apply route daily     oxyCODONE-acetaminophen 5-325 MG per tablet  Commonly known as: Percocet  Take 1 tablet by mouth every 6 hours as needed for Pain for up to 7 days. Intended supply: 3 days.  Take lowest dose possible to manage pain Max Daily Amount: 4 tablets     tamsulosin 0.4 MG capsule  Commonly known as: FLOMAX  Take 1 capsule by mouth daily  Start taking on: February 22, 2023           * This list has 4 medication(s) that are the same as other medications prescribed for you. Read the directions carefully, and ask your doctor or other care provider to review them with you. CONTINUE taking these medications      acetaminophen 325 MG tablet  Commonly known as: Tylenol  Take 2 tablets by mouth every 6 hours as needed for Pain     ALPRAZolam 1 MG tablet  Commonly known as: XANAX     gabapentin 100 MG capsule  Commonly known as: NEURONTIN     Latuda 80 MG Tabs tablet  Generic drug: lurasidone     Lotrel 10-20 MG per capsule  Generic drug: amLODIPine-benazepril     Lumbar Back Brace/Support Pad Misc  1 each by Does not apply route daily as needed.      propranolol 10 MG tablet  Commonly known as: INDERAL            STOP taking these medications      hydroCHLOROthiazide 12.5 MG capsule  Commonly known as: Trice Sandoval               Where to Get Your Medications        These medications were sent to [address] [place], [place] [address] [address] [facility] [address]      Phone: [phone]   alogliptin 25 MG Tabs tablet  blood glucose test strips  Lancets Misc  oxyCODONE-acetaminophen 5-325 MG per tablet       These medications were sent to [facility] [address] [facility], [address] [place] [address]      Phone: [phone]   blood glucose test strips  glucose monitoring kit  InCare Straight 12FR/20CM Misc  Lancets Misc  tamsulosin 0.4 MG capsule         Discharge Procedure Orders   [facility]) Medication Mgmt (Diabetes - Clinical Pharmacy) - [address]   Referral Priority: Routine Referral Type: Consult for Advice and Opinion   Referral Reason: Specialty Services Required   Requested Specialty: Pharmacist   Number of Visits Requested: 1 Expiration Date: 02/20/25     [facility] Diabetes Education - [facility]   Referral Priority: Routine Referral Type: Eval and Treat   Referral Reason: Specialty Services Required   Number of Visits Requested: 1     Urinary catheter straight     DME Order for Glucometer as OP   Order Comments: You must complete the order parameters below and add the medical necessity documentation for this DME in a separate note. Home blood glucose monitor    Diagnosis: new onset DM 2    Testing frequency: Check blood sugars and record 2 times daily and prn    Duration: purchase       Time Spent on discharge is  33 mins in patient examination, evaluation, counseling as well as medication reconciliation, prescriptions for required medications, discharge plan and follow up. Electronically signed by   Jocelyn Tee MD  2/21/2023  6:45 PM      Thank you Dr. Kevon Victor MD for the opportunity to be involved in this patient's care.

## 2023-02-21 NOTE — PLAN OF CARE
Problem: Pain  Goal: Verbalizes/displays adequate comfort level or baseline comfort level  2/21/2023 0303 by Crystal Bhakta RN  Outcome: Progressing  2/20/2023 1745 by Jocelyn Jones RN  Outcome: Progressing     Problem: Skin/Tissue Integrity  Goal: Absence of new skin breakdown  Description: 1.  Monitor for areas of redness and/or skin breakdown  2.  Assess vascular access sites hourly  3.  Every 4-6 hours minimum:  Change oxygen saturation probe site  4.  Every 4-6 hours:  If on nasal continuous positive airway pressure, respiratory therapy assess nares and determine need for appliance change or resting period.  2/21/2023 0303 by Crystal Bhakta RN  Outcome: Progressing  2/20/2023 1745 by Jocelyn Jones RN  Outcome: Progressing     Problem: Safety - Adult  Goal: Free from fall injury  2/21/2023 0303 by Crystal Bhakta RN  Outcome: Progressing  2/20/2023 1745 by Jocelyn Jones RN  Outcome: Progressing     Problem: ABCDS Injury Assessment  Goal: Absence of physical injury  2/21/2023 0303 by Crystal Bhakta RN  Outcome: Progressing  Flowsheets (Taken 2/20/2023 2010)  Absence of Physical Injury: Implement safety measures based on patient assessment  2/20/2023 1745 by Jocelyn Jones RN  Outcome: Progressing     Problem: Chronic Conditions and Co-morbidities  Goal: Patient's chronic conditions and co-morbidity symptoms are monitored and maintained or improved  2/21/2023 0303 by Crystal Bhakta RN  Outcome: Progressing  2/20/2023 1745 by Jocelyn Jones RN  Outcome: Progressing

## 2023-02-21 NOTE — PROGRESS NOTES
Please see below - initial Diabetes Education note pulled forward from yesterday for reference. Follow up visit done today. Reinforced Survival Skills with a focus on - hypo and hyperglycemia teaching and keeping BG log. Demonstrated how to use a glucometer again today. I saw prescriptions for glucometer supplies sent to [facility]. I messaged physician and mentioned to nurse about need for order for actual glucometer too (the patient dose not have a glucometer currently). I also mentioned to nurse about patient's need for PCP for follow up on diabetes. Thank you for the referal to diabetes education. Kady Horn, RN  2-21-23          Inpatient Diabetes Education    Shayna Garcia was seen for evaluation and education on new diagnosis of Type 2 diabetes. Patient denies a family history of diabetes and denies that she has ever been told that she has an elevated blood sugar prior to this admission. Lab Results   Component Value Date    LABA1C 10.7 (H) 02/18/2023       Following Survival Skills education topics were covered as appropriate to patient plan of for care:  __X_ Type 2 Diabetes diagnosis as chronic and progressive  __X_ Healthy eating - CHO food groups and need for CHO controlled diet.   Elimination of sugary beverages, avoid skipping meal  ___ Role of physical activity - bouts of walking, swimming or low impact aerobics as recommended by care providers- aim for 30 minutes per day  __X_ Blood Glucose Self-Monitoring ( BGSM)  /how to use a BG meter - Demonstrated use of a glucometer  _X__ Blood sugar targets Pre meal 80 - 130 and 2 hours post meal < 180  ___ Hyperglycemia  ( BG over 250) signs and symptoms and treatment   ___ Sick Day Care  ___ Hypoglycemia( BG < than 70) signs and symptoms and treatment \"Rule of 15\"  ___ Keep BG log and take log and meter to follow up HCP appointments  __X_ Medications -  Insulin - patient states that she understands that she has been getting insulin during this hospitalization. Basis teaching about insulin done. Showed syringe vs pen  __X_ Medications - Orals - per progress notes - alogliptin     Following Support materials were provided for patient to take home:  __X_ Educational Booklets as available \"Diabetes and you\"  __X_ Be safe with [place] teaching sheet /  [employer]  __X_ [address] [facility] Diabetes Education brochure or contact card    Patient verbalizes understanding  of survival skills education. Spoke with patient's nurse, Celsa Downing. I let her know that I saw Anibal Reyes and that she could benefit from support in finding a new PCP. I will continue to follow Moy's case and continue to work on [employer] education with her. See check list above to see what topics have been covered so far. RECOMMENDATIONS FOR OUTPATIENT PLAN:    Diabetes Self-Monitoring Supplies:  __X_ Preferred / formulary blood glucose meter for BGSM at home use    __X_ Strips and lancets for daily - bid frequency of home BGSM      Diabetes Education / HCP follow -up:   _x_ Would recommend follow -up education at outpatient diabetes education at [place] [address]. An ordered is needed for this service and can be placed via EHR. Discharge Navigator --- Med Reconciliation -- New orders for discharge tab -- search diabetic ed - REF20 -  [facility]  - review and sign     __X_ Follow -up with HCP / PCP within one week. - Patient states that she is in need of new PCP - I relayed this to her nurse.     DL GAN RN

## 2023-02-21 NOTE — DISCHARGE INSTR - COC
Continuity of Care Form    Patient Name: Sarath Oshea   :  1965  MRN:  [id number]    [facility] date:  2023  Discharge date:  2023    Code Status Order: Full Code   Advance Directives:     Admitting Physician:  Saulo Calle DO  PCP: Funmilayo Katz MD    Discharging Nurse: [facility] Unit/Room#: 1855/5651-44  Discharging Unit Phone Number: [phone]    Emergency Contact:   Extended Emergency Contact Information  Primary Emergency Contact: [facility]  Address: [address], [address] Phone: [phone]  Work Phone: [phone]  Mobile Phone: [phone]  Relation: Brother/Sister  Hearing or visual needs: None  Other needs: None  Preferred language: English   needed? No  Secondary Emergency Contact: Sandip Geller   [facility] [address] [facility] Phone: [phone]  Work Phone: [phone]  Mobile Phone: [phone]  Relation: Child  Hearing or visual needs: None  Other needs: None  Preferred language: English   needed? No    Past Surgical History:  Past Surgical History:   Procedure Laterality Date     SECTION      HYSTERECTOMY (CERVIX STATUS UNKNOWN)  2008    TONSILLECTOMY         Immunization History: There is no immunization history on file for this patient.     Active Problems:  Patient Active Problem List   Diagnosis Code    Displacement of lumbar intervertebral disc without myelopathy M51.26    Acute urinary retention R33.8    Primary hypertension I10    Mitral valve prolapse I34.1    Anxiety F41.9    New onset type 2 diabetes mellitus ([address] E11.9    Hyponatremia E87.1    GUERO (acute kidney injury) ([address] N17.9    Bilateral hydronephrosis N13.30       Isolation/Infection:   Isolation            No Isolation          Patient Infection Status       None to display            Nurse Assessment:  Last Vital Signs: /75   Pulse 99   Temp 98.3 °F (36.8 °C) (Oral)   Resp 16   Ht 5' 2\" (1.575 m)   Wt 163 lb 2.3 oz (74 kg)   SpO2 98%   BMI 29.84 kg/m²     Last documented pain score (0-10 scale): Pain Level: 8  Last Weight:   Wt Readings from Last 1 Encounters:   02/19/23 163 lb 2.3 oz (74 kg)     Mental Status:  oriented and alert    IV Access:  - None    Nursing Mobility/ADLs:  Walking   Independent  Transfer  Independent  Bathing  Independent  Dressing  Independent  Toileting  Assisted  Feeding  Independent  Med [facility] Delivery   whole    Wound Care Documentation and Therapy:        Elimination:  Continence: Bowel: Yes  Bladder: Yes  Urinary Catheter: None   Colostomy/Ileostomy/Ileal Conduit: No       Date of Last BM: ***    Intake/Output Summary (Last 24 hours) at 2/21/2023 1136  Last data filed at 2/21/2023 1777  Gross per 24 hour   Intake 2923.47 ml   Output 3418 ml   Net -494.53 ml     I/O last 3 completed shifts: In: 3173.5 [P.O.:1550; I.V.:1623.5]  Out: 5326 [ZAESZ:2900]    Safety Concerns:     None    Impairments/Disabilities:      None    Nutrition Therapy:  Current Nutrition Therapy:   - Oral Diet:  Carb Control 4 carbs/meal (1800kcals/day)    Routes of Feeding: Oral  Liquids: No Restrictions  Daily Fluid Restriction: no  Last Modified Barium Swallow with Video (Video Swallowing Test): not done    Treatments at the Time of Hospital Discharge:   Respiratory Treatments: ***  Oxygen Therapy:  is not on home oxygen therapy.   Ventilator:    - No ventilator support    Rehab Therapies: Physical Therapy  Weight Bearing Status/Restrictions: No weight bearing restrictions  Other Medical Equipment (for information only, NOT a DME order):  {EQUIPMENT:[id number]}  Other Treatments: Straight cath as need every 4-6 hours, Skilled nursing, medical education along with diabetic education    Patient's personal belongings (please select all that are sent with patient):  Patient belongings bag    RN SIGNATURE:  Electronically signed by Lucia Meyer RN on 2/21/23 at 3:58 PM EST    CASE MANAGEMENT/SOCIAL WORK SECTION    Inpatient Status Date: ***    Readmission Risk Assessment Score:  Readmission Risk              Risk of Unplanned Readmission:  18           Discharging [facility] Facility[facility] Agency   [facility]  Address:  [address] [place] [address]  Fax:    Dialysis Facility (if applicable)   Name:  Address:  Dialysis Schedule:  Phone:  Fax:    / signature: Electronically signed by Deyanira Saunders RN on 2/21/23 at 4:31 PM EST    PHYSICIAN SECTION    Prognosis: Guarded    Condition at Discharge: Stable    Rehab Potential (if transferring to Rehab): Fair    Recommended Labs or Other Treatments After Discharge: PT, OT, nrusing evaluation and treatment, follow up urology outpateint, straight cath every 4-6 hours for post void >400 mL follow up urology, diabetes education    Physician Certification: I certify the above information and transfer of Sarath Oshea  is necessary for the continuing treatment of the diagnosis listed and that she requires Home Care for less 30 days.      Update Admission H&P: No change in H&P    PHYSICIAN SIGNATURE:  Electronically signed by Melene Boast, MD on 2/21/23 at 11:36 AM EST

## 2023-02-21 NOTE — CARE COORDINATION
Received a call from Negar Jackson at [facility], she tell me that they can accept the patient.       [address] [place] [address] and updated Negar Jackson that the patient would be leaving today, she tell me that she can pull what she need out of epic

## 2023-02-22 NOTE — DISCHARGE SUMMARY
Patient discharged by self with all belongings to private residence. Discharge paperwork provided. One IV removed with no complications and catheter intact. Instructions provided to patient on intermittent straight cath. Patient demonstrated ability to straight cath herself with writer present. Medication provided to patient via meds to beds. Patient ambulated off unit.

## 2023-02-23 LAB
MICROORGANISM SPEC CULT: NORMAL
MICROORGANISM SPEC CULT: NORMAL
SERVICE CMNT-IMP: NORMAL
SERVICE CMNT-IMP: NORMAL
SPECIMEN DESCRIPTION: NORMAL
SPECIMEN DESCRIPTION: NORMAL

## 2023-03-01 ENCOUNTER — TELEPHONE (OUTPATIENT)
Dept: PHARMACY | Age: 58
End: 2023-03-01

## 2023-03-01 NOTE — TELEPHONE ENCOUNTER
Clinic received new referral for Diabetes, called to schedule and was speaking with patient but she had another call coming in and wanted to take that. I asked her to call us back to get appt scheduled.       Edwige Gill [facility], CACP  Clinical Pharmacist Medication Management  3/1/2023  9:27 AM

## 2023-03-23 ENCOUNTER — APPOINTMENT (OUTPATIENT)
Dept: CT IMAGING | Age: 58
DRG: 871 | End: 2023-03-23
Payer: COMMERCIAL

## 2023-03-23 ENCOUNTER — APPOINTMENT (OUTPATIENT)
Dept: GENERAL RADIOLOGY | Age: 58
DRG: 871 | End: 2023-03-23
Payer: COMMERCIAL

## 2023-03-23 ENCOUNTER — HOSPITAL ENCOUNTER (INPATIENT)
Age: 58
LOS: 5 days | Discharge: HOME | DRG: 871 | End: 2023-03-28
Attending: EMERGENCY MEDICINE
Payer: COMMERCIAL

## 2023-03-23 ENCOUNTER — OFFICE VISIT (OUTPATIENT)
Dept: UROLOGY | Age: 58
End: 2023-03-23
Payer: COMMERCIAL

## 2023-03-23 VITALS
HEIGHT: 62 IN | DIASTOLIC BLOOD PRESSURE: 87 MMHG | SYSTOLIC BLOOD PRESSURE: 121 MMHG | BODY MASS INDEX: 30 KG/M2 | WEIGHT: 163 LBS | HEART RATE: 124 BPM

## 2023-03-23 DIAGNOSIS — E87.1 HYPONATREMIA: ICD-10-CM

## 2023-03-23 DIAGNOSIS — E11.00 HYPEROSMOLAR HYPERGLYCEMIC STATE (HHS) (HCC): ICD-10-CM

## 2023-03-23 DIAGNOSIS — F41.9 ANXIETY: ICD-10-CM

## 2023-03-23 DIAGNOSIS — E87.6 HYPOKALEMIA: ICD-10-CM

## 2023-03-23 DIAGNOSIS — E83.51 HYPOCALCEMIA: ICD-10-CM

## 2023-03-23 DIAGNOSIS — A41.9 SEPSIS WITHOUT ACUTE ORGAN DYSFUNCTION, DUE TO UNSPECIFIED ORGANISM (HCC): Primary | ICD-10-CM

## 2023-03-23 DIAGNOSIS — N31.2 BLADDER ATONY: ICD-10-CM

## 2023-03-23 DIAGNOSIS — R33.9 URINARY RETENTION: Primary | ICD-10-CM

## 2023-03-23 LAB
ABSOLUTE EOS #: 0 K/UL (ref 0–0.4)
ABSOLUTE IMMATURE GRANULOCYTE: 1.29 K/UL (ref 0–0.3)
ABSOLUTE LYMPH #: 1.72 K/UL (ref 1–4.8)
ABSOLUTE MONO #: 1.29 K/UL (ref 0.1–0.8)
ALBUMIN SERPL-MCNC: 2.7 G/DL (ref 3.5–5.2)
ALBUMIN/GLOBULIN RATIO: 0.6 (ref 1–2.5)
ALP SERPL-CCNC: 143 U/L (ref 35–104)
ALT SERPL-CCNC: 13 U/L (ref 5–33)
ANION GAP SERPL CALCULATED.3IONS-SCNC: 20 MMOL/L (ref 9–17)
ANION GAP: 17 MMOL/L (ref 7–16)
AST SERPL-CCNC: 14 U/L
BACTERIA: ABNORMAL
BASOPHILS # BLD: 0 % (ref 0–2)
BASOPHILS ABSOLUTE: 0 K/UL (ref 0–0.2)
BETA-HYDROXYBUTYRATE: 3.13 MMOL/L (ref 0.02–0.27)
BILIRUB SERPL-MCNC: 0.3 MG/DL (ref 0.3–1.2)
BILIRUBIN URINE: NEGATIVE
BILIRUBIN, POC: NEGATIVE
BLOOD URINE, POC: ABNORMAL
BUN SERPL-MCNC: 31 MG/DL (ref 6–20)
CALCIUM SERPL-MCNC: 8.9 MG/DL (ref 8.6–10.4)
CARBOXYHEMOGLOBIN: 1.6 % (ref 0–5)
CASTS UA: ABNORMAL /LPF (ref 0–8)
CHLORIDE SERPL-SCNC: 86 MMOL/L (ref 98–107)
CHP ED QC CHECK: YES
CLARITY, POC: ABNORMAL
CO2 SERPL-SCNC: 18 MMOL/L (ref 20–31)
COLOR, POC: YELLOW
COLOR: YELLOW
CREAT SERPL-MCNC: 1.05 MG/DL (ref 0.5–0.9)
EGFR, POC: >60 ML/MIN/1.73M2
EOSINOPHILS RELATIVE PERCENT: 0 % (ref 1–4)
EPITHELIAL CELLS UA: ABNORMAL /HPF (ref 0–5)
FIO2: ABNORMAL
FLUAV AG SPEC QL: NEGATIVE
FLUBV AG SPEC QL: NEGATIVE
GFR SERPL CREATININE-BSD FRML MDRD: >60 ML/MIN/1.73M2
GLUCOSE BLD-MCNC: 340 MG/DL
GLUCOSE BLD-MCNC: 340 MG/DL (ref 65–105)
GLUCOSE BLD-MCNC: 410 MG/DL
GLUCOSE BLD-MCNC: 410 MG/DL (ref 65–105)
GLUCOSE BLD-MCNC: 432 MG/DL
GLUCOSE BLD-MCNC: 432 MG/DL (ref 65–105)
GLUCOSE BLD-MCNC: 581 MG/DL
GLUCOSE BLD-MCNC: 581 MG/DL (ref 65–105)
GLUCOSE BLD-MCNC: 692 MG/DL (ref 74–100)
GLUCOSE BLD-MCNC: NORMAL MG/DL
GLUCOSE SERPL-MCNC: 520 MG/DL (ref 70–99)
GLUCOSE UR STRIP.AUTO-MCNC: ABNORMAL MG/DL
GLUCOSE URINE, POC: NEGATIVE
HCO3 VENOUS: 16.3 MMOL/L (ref 22–29)
HCO3 VENOUS: 19.5 MMOL/L (ref 24–30)
HCT VFR BLD AUTO: 29 % (ref 36.3–47.1)
HGB BLD-MCNC: 9.7 G/DL (ref 11.9–15.1)
IMMATURE GRANULOCYTES: 3 %
KETONES UR STRIP.AUTO-MCNC: ABNORMAL MG/DL
KETONES, POC: ABNORMAL
LACTIC ACID, SEPSIS WHOLE BLOOD: 4.9 MMOL/L (ref 0.5–1.9)
LACTIC ACID, WHOLE BLOOD: 2.7 MMOL/L (ref 0.7–2.1)
LEUKOCYTE EST, POC: NEGATIVE
LEUKOCYTE ESTERASE UR QL STRIP.AUTO: NEGATIVE
LIPASE SERPL-CCNC: 26 U/L (ref 13–60)
LYMPHOCYTES # BLD: 4 % (ref 24–44)
MAGNESIUM SERPL-MCNC: 1.9 MG/DL (ref 1.6–2.6)
MCH RBC QN AUTO: 27.2 PG (ref 25.2–33.5)
MCHC RBC AUTO-ENTMCNC: 33.4 G/DL (ref 28.4–34.8)
MCV RBC AUTO: 81.5 FL (ref 82.6–102.9)
MONOCYTES # BLD: 3 % (ref 1–7)
MORPHOLOGY: NORMAL
NEGATIVE BASE EXCESS, VEN: 4.4 MMOL/L (ref 0–2)
NEGATIVE BASE EXCESS, VEN: 7 (ref 0–2)
NITRITE UR QL STRIP.AUTO: NEGATIVE
NITRITE, POC: NEGATIVE
NRBC AUTOMATED: 0 PER 100 WBC
O2 SAT, VEN: 42 % (ref 60–85)
O2 SAT, VEN: 71.6 % (ref 60–85)
PATIENT TEMP: 37
PCO2, VEN: 27.5 MM HG (ref 41–51)
PCO2, VEN: 33.5 MM HG (ref 39–55)
PDW BLD-RTO: 14.3 % (ref 11.8–14.4)
PH VENOUS: 7.38 (ref 7.32–7.42)
PH VENOUS: 7.38 (ref 7.32–7.43)
PH, POC: 5.5
PLATELET # BLD AUTO: 470 K/UL (ref 138–453)
PMV BLD AUTO: 10.7 FL (ref 8.1–13.5)
PO2, VEN: 23.5 MM HG (ref 30–50)
PO2, VEN: 40.8 MM HG (ref 30–50)
POC BUN: 34 MG/DL (ref 8–26)
POC CHLORIDE: 89 MMOL/L (ref 98–107)
POC CREATININE: 0.89 MG/DL (ref 0.51–1.19)
POC HEMATOCRIT: 46 % (ref 36–46)
POC HEMOGLOBIN: 15.7 G/DL (ref 12–16)
POC IONIZED CALCIUM: 1.06 MMOL/L (ref 1.15–1.33)
POC LACTIC ACID: 3 MMOL/L (ref 0.56–1.39)
POC POTASSIUM: 4.2 MMOL/L (ref 3.5–4.5)
POC SODIUM: 120 MMOL/L (ref 138–146)
POC TCO2: 15 MMOL/L (ref 22–30)
POTASSIUM SERPL-SCNC: 3.7 MMOL/L (ref 3.7–5.3)
PROT SERPL-MCNC: 7.4 G/DL (ref 6.4–8.3)
PROT UR STRIP.AUTO-MCNC: 5 MG/DL (ref 5–8)
PROT UR STRIP.AUTO-MCNC: ABNORMAL MG/DL
PROTEIN, POC: ABNORMAL
RBC # BLD: 3.56 M/UL (ref 3.95–5.11)
RBC CLUMPS #/AREA URNS AUTO: ABNORMAL /HPF (ref 0–4)
SARS-COV-2 RDRP RESP QL NAA+PROBE: NOT DETECTED
SEG NEUTROPHILS: 90 % (ref 36–66)
SEGMENTED NEUTROPHILS ABSOLUTE COUNT: 38.8 K/UL (ref 1.8–7.7)
SERUM OSMOLALITY: 310 MOSM/KG (ref 275–295)
SODIUM SERPL-SCNC: 124 MMOL/L (ref 135–144)
SPECIFIC GRAVITY UA: 1.02 (ref 1–1.03)
SPECIFIC GRAVITY, POC: 1.02
SPECIMEN DESCRIPTION: NORMAL
TROPONIN I SERPL DL<=0.01 NG/ML-MCNC: 10 NG/L (ref 0–14)
TROPONIN I SERPL DL<=0.01 NG/ML-MCNC: 12 NG/L (ref 0–14)
TURBIDITY: ABNORMAL
URINE HGB: ABNORMAL
UROBILINOGEN, POC: 0.2
UROBILINOGEN, URINE: NORMAL
WBC # BLD AUTO: 43.1 K/UL (ref 3.5–11.3)
WBC UA: ABNORMAL /HPF (ref 0–5)

## 2023-03-23 PROCEDURE — 81001 URINALYSIS AUTO W/SCOPE: CPT

## 2023-03-23 PROCEDURE — 80053 COMPREHEN METABOLIC PANEL: CPT

## 2023-03-23 PROCEDURE — 85014 HEMATOCRIT: CPT

## 2023-03-23 PROCEDURE — 87154 CUL TYP ID BLD PTHGN 6+ TRGT: CPT

## 2023-03-23 PROCEDURE — 1036F TOBACCO NON-USER: CPT | Performed by: SPECIALIST

## 2023-03-23 PROCEDURE — 2060000000 HC ICU INTERMEDIATE R&B

## 2023-03-23 PROCEDURE — 87077 CULTURE AEROBIC IDENTIFY: CPT

## 2023-03-23 PROCEDURE — 93005 ELECTROCARDIOGRAM TRACING: CPT | Performed by: EMERGENCY MEDICINE

## 2023-03-23 PROCEDURE — G8427 DOCREV CUR MEDS BY ELIG CLIN: HCPCS | Performed by: SPECIALIST

## 2023-03-23 PROCEDURE — 99285 EMERGENCY DEPT VISIT HI MDM: CPT

## 2023-03-23 PROCEDURE — 87635 SARS-COV-2 COVID-19 AMP PRB: CPT

## 2023-03-23 PROCEDURE — 96361 HYDRATE IV INFUSION ADD-ON: CPT

## 2023-03-23 PROCEDURE — 74177 CT ABD & PELVIS W/CONTRAST: CPT

## 2023-03-23 PROCEDURE — 83605 ASSAY OF LACTIC ACID: CPT

## 2023-03-23 PROCEDURE — 96365 THER/PROPH/DIAG IV INF INIT: CPT

## 2023-03-23 PROCEDURE — 84484 ASSAY OF TROPONIN QUANT: CPT

## 2023-03-23 PROCEDURE — G8484 FLU IMMUNIZE NO ADMIN: HCPCS | Performed by: SPECIALIST

## 2023-03-23 PROCEDURE — 82010 KETONE BODYS QUAN: CPT

## 2023-03-23 PROCEDURE — 96367 TX/PROPH/DG ADDL SEQ IV INF: CPT

## 2023-03-23 PROCEDURE — 1111F DSCHRG MED/CURRENT MED MERGE: CPT | Performed by: SPECIALIST

## 2023-03-23 PROCEDURE — 83036 HEMOGLOBIN GLYCOSYLATED A1C: CPT

## 2023-03-23 PROCEDURE — 82805 BLOOD GASES W/O2 SATURATION: CPT

## 2023-03-23 PROCEDURE — 87186 SC STD MICRODIL/AGAR DIL: CPT

## 2023-03-23 PROCEDURE — 87086 URINE CULTURE/COLONY COUNT: CPT

## 2023-03-23 PROCEDURE — 6360000002 HC RX W HCPCS: Performed by: STUDENT IN AN ORGANIZED HEALTH CARE EDUCATION/TRAINING PROGRAM

## 2023-03-23 PROCEDURE — 6360000004 HC RX CONTRAST MEDICATION: Performed by: STUDENT IN AN ORGANIZED HEALTH CARE EDUCATION/TRAINING PROGRAM

## 2023-03-23 PROCEDURE — 82330 ASSAY OF CALCIUM: CPT

## 2023-03-23 PROCEDURE — 80051 ELECTROLYTE PANEL: CPT

## 2023-03-23 PROCEDURE — 6370000000 HC RX 637 (ALT 250 FOR IP): Performed by: STUDENT IN AN ORGANIZED HEALTH CARE EDUCATION/TRAINING PROGRAM

## 2023-03-23 PROCEDURE — 87040 BLOOD CULTURE FOR BACTERIA: CPT

## 2023-03-23 PROCEDURE — 83735 ASSAY OF MAGNESIUM: CPT

## 2023-03-23 PROCEDURE — 70450 CT HEAD/BRAIN W/O DYE: CPT

## 2023-03-23 PROCEDURE — 3074F SYST BP LT 130 MM HG: CPT | Performed by: SPECIALIST

## 2023-03-23 PROCEDURE — 36415 COLL VENOUS BLD VENIPUNCTURE: CPT

## 2023-03-23 PROCEDURE — 51798 US URINE CAPACITY MEASURE: CPT

## 2023-03-23 PROCEDURE — 82803 BLOOD GASES ANY COMBINATION: CPT

## 2023-03-23 PROCEDURE — 82947 ASSAY GLUCOSE BLOOD QUANT: CPT

## 2023-03-23 PROCEDURE — 3017F COLORECTAL CA SCREEN DOC REV: CPT | Performed by: SPECIALIST

## 2023-03-23 PROCEDURE — 71045 X-RAY EXAM CHEST 1 VIEW: CPT

## 2023-03-23 PROCEDURE — 96372 THER/PROPH/DIAG INJ SC/IM: CPT

## 2023-03-23 PROCEDURE — 84520 ASSAY OF UREA NITROGEN: CPT

## 2023-03-23 PROCEDURE — 2580000003 HC RX 258: Performed by: STUDENT IN AN ORGANIZED HEALTH CARE EDUCATION/TRAINING PROGRAM

## 2023-03-23 PROCEDURE — G8417 CALC BMI ABV UP PARAM F/U: HCPCS | Performed by: SPECIALIST

## 2023-03-23 PROCEDURE — 81003 URINALYSIS AUTO W/O SCOPE: CPT | Performed by: SPECIALIST

## 2023-03-23 PROCEDURE — 99202 OFFICE O/P NEW SF 15 MIN: CPT

## 2023-03-23 PROCEDURE — 83930 ASSAY OF BLOOD OSMOLALITY: CPT

## 2023-03-23 PROCEDURE — 83690 ASSAY OF LIPASE: CPT

## 2023-03-23 PROCEDURE — 3079F DIAST BP 80-89 MM HG: CPT | Performed by: SPECIALIST

## 2023-03-23 PROCEDURE — 87804 INFLUENZA ASSAY W/OPTIC: CPT

## 2023-03-23 PROCEDURE — 99213 OFFICE O/P EST LOW 20 MIN: CPT | Performed by: SPECIALIST

## 2023-03-23 PROCEDURE — 85025 COMPLETE CBC W/AUTO DIFF WBC: CPT

## 2023-03-23 PROCEDURE — 82565 ASSAY OF CREATININE: CPT

## 2023-03-23 PROCEDURE — 87205 SMEAR GRAM STAIN: CPT

## 2023-03-23 PROCEDURE — 2580000003 HC RX 258: Performed by: NURSE PRACTITIONER

## 2023-03-23 RX ORDER — INSULIN LISPRO 100 [IU]/ML
0-4 INJECTION, SOLUTION INTRAVENOUS; SUBCUTANEOUS NIGHTLY
Status: DISCONTINUED | OUTPATIENT
Start: 2023-03-23 | End: 2023-03-24

## 2023-03-23 RX ORDER — TAMSULOSIN HYDROCHLORIDE 0.4 MG/1
0.4 CAPSULE ORAL DAILY
Status: DISCONTINUED | OUTPATIENT
Start: 2023-03-24 | End: 2023-03-28 | Stop reason: HOSPADM

## 2023-03-23 RX ORDER — ACETAMINOPHEN 325 MG/1
650 TABLET ORAL EVERY 6 HOURS PRN
Status: DISCONTINUED | OUTPATIENT
Start: 2023-03-23 | End: 2023-03-28 | Stop reason: HOSPADM

## 2023-03-23 RX ORDER — SODIUM CHLORIDE, SODIUM LACTATE, POTASSIUM CHLORIDE, AND CALCIUM CHLORIDE .6; .31; .03; .02 G/100ML; G/100ML; G/100ML; G/100ML
1000 INJECTION, SOLUTION INTRAVENOUS ONCE
Status: COMPLETED | OUTPATIENT
Start: 2023-03-23 | End: 2023-03-23

## 2023-03-23 RX ORDER — INSULIN LISPRO 100 [IU]/ML
10 INJECTION, SOLUTION INTRAVENOUS; SUBCUTANEOUS ONCE
Status: COMPLETED | OUTPATIENT
Start: 2023-03-23 | End: 2023-03-23

## 2023-03-23 RX ORDER — SODIUM CHLORIDE 0.9 % (FLUSH) 0.9 %
5-40 SYRINGE (ML) INJECTION PRN
Status: DISCONTINUED | OUTPATIENT
Start: 2023-03-23 | End: 2023-03-28 | Stop reason: HOSPADM

## 2023-03-23 RX ORDER — QUETIAPINE FUMARATE 200 MG/1
TABLET, FILM COATED ORAL
Status: ON HOLD | COMMUNITY
Start: 2023-03-22

## 2023-03-23 RX ORDER — INSULIN LISPRO 100 [IU]/ML
0-8 INJECTION, SOLUTION INTRAVENOUS; SUBCUTANEOUS
Status: DISCONTINUED | OUTPATIENT
Start: 2023-03-24 | End: 2023-03-24

## 2023-03-23 RX ORDER — LISINOPRIL 20 MG/1
20 TABLET ORAL DAILY
Status: DISCONTINUED | OUTPATIENT
Start: 2023-03-24 | End: 2023-03-28 | Stop reason: HOSPADM

## 2023-03-23 RX ORDER — SODIUM CHLORIDE 9 MG/ML
INJECTION, SOLUTION INTRAVENOUS CONTINUOUS
Status: DISCONTINUED | OUTPATIENT
Start: 2023-03-23 | End: 2023-03-26

## 2023-03-23 RX ORDER — PROPRANOLOL HYDROCHLORIDE 10 MG/1
10 TABLET ORAL 2 TIMES DAILY
Status: DISCONTINUED | OUTPATIENT
Start: 2023-03-23 | End: 2023-03-28 | Stop reason: HOSPADM

## 2023-03-23 RX ORDER — DEXTROSE MONOHYDRATE 100 MG/ML
INJECTION, SOLUTION INTRAVENOUS CONTINUOUS PRN
Status: DISCONTINUED | OUTPATIENT
Start: 2023-03-23 | End: 2023-03-28 | Stop reason: HOSPADM

## 2023-03-23 RX ORDER — 0.9 % SODIUM CHLORIDE 0.9 %
1000 INTRAVENOUS SOLUTION INTRAVENOUS ONCE
Status: COMPLETED | OUTPATIENT
Start: 2023-03-23 | End: 2023-03-23

## 2023-03-23 RX ORDER — AMLODIPINE BESYLATE AND BENAZEPRIL HYDROCHLORIDE 10; 20 MG/1; MG/1
1 CAPSULE ORAL DAILY
Status: DISCONTINUED | OUTPATIENT
Start: 2023-03-24 | End: 2023-03-23

## 2023-03-23 RX ORDER — ACETAMINOPHEN 650 MG/1
650 SUPPOSITORY RECTAL EVERY 6 HOURS PRN
Status: DISCONTINUED | OUTPATIENT
Start: 2023-03-23 | End: 2023-03-28 | Stop reason: HOSPADM

## 2023-03-23 RX ORDER — ENOXAPARIN SODIUM 100 MG/ML
40 INJECTION SUBCUTANEOUS DAILY
Status: DISCONTINUED | OUTPATIENT
Start: 2023-03-24 | End: 2023-03-24

## 2023-03-23 RX ORDER — SODIUM CHLORIDE 0.9 % (FLUSH) 0.9 %
5-40 SYRINGE (ML) INJECTION EVERY 12 HOURS SCHEDULED
Status: DISCONTINUED | OUTPATIENT
Start: 2023-03-23 | End: 2023-03-28 | Stop reason: HOSPADM

## 2023-03-23 RX ORDER — AMLODIPINE BESYLATE 10 MG/1
10 TABLET ORAL DAILY
Status: DISCONTINUED | OUTPATIENT
Start: 2023-03-24 | End: 2023-03-28

## 2023-03-23 RX ORDER — SODIUM CHLORIDE, SODIUM LACTATE, POTASSIUM CHLORIDE, AND CALCIUM CHLORIDE .6; .31; .03; .02 G/100ML; G/100ML; G/100ML; G/100ML
1000 INJECTION, SOLUTION INTRAVENOUS ONCE
Status: DISCONTINUED | OUTPATIENT
Start: 2023-03-23 | End: 2023-03-24

## 2023-03-23 RX ORDER — SODIUM CHLORIDE 9 MG/ML
INJECTION, SOLUTION INTRAVENOUS PRN
Status: DISCONTINUED | OUTPATIENT
Start: 2023-03-23 | End: 2023-03-28 | Stop reason: HOSPADM

## 2023-03-23 RX ADMIN — SODIUM CHLORIDE, POTASSIUM CHLORIDE, SODIUM LACTATE AND CALCIUM CHLORIDE 1000 ML: 600; 310; 30; 20 INJECTION, SOLUTION INTRAVENOUS at 21:34

## 2023-03-23 RX ADMIN — SODIUM CHLORIDE 1000 ML: 9 INJECTION, SOLUTION INTRAVENOUS at 15:52

## 2023-03-23 RX ADMIN — SODIUM CHLORIDE, POTASSIUM CHLORIDE, SODIUM LACTATE AND CALCIUM CHLORIDE 1000 ML: 600; 310; 30; 20 INJECTION, SOLUTION INTRAVENOUS at 18:11

## 2023-03-23 RX ADMIN — CEFEPIME 2000 MG: 2 INJECTION, POWDER, FOR SOLUTION INTRAVENOUS at 19:40

## 2023-03-23 RX ADMIN — SODIUM CHLORIDE: 9 INJECTION, SOLUTION INTRAVENOUS at 23:43

## 2023-03-23 RX ADMIN — IOPAMIDOL 75 ML: 755 INJECTION, SOLUTION INTRAVENOUS at 20:53

## 2023-03-23 RX ADMIN — INSULIN LISPRO 10 UNITS: 100 INJECTION, SOLUTION INTRAVENOUS; SUBCUTANEOUS at 21:34

## 2023-03-23 RX ADMIN — INSULIN LISPRO 10 UNITS: 100 INJECTION, SOLUTION INTRAVENOUS; SUBCUTANEOUS at 16:34

## 2023-03-23 RX ADMIN — Medication 1000 MG: at 20:17

## 2023-03-23 ASSESSMENT — PAIN DESCRIPTION - LOCATION: LOCATION: ABDOMEN

## 2023-03-23 ASSESSMENT — PAIN - FUNCTIONAL ASSESSMENT: PAIN_FUNCTIONAL_ASSESSMENT: 0-10

## 2023-03-23 ASSESSMENT — PAIN SCALES - GENERAL: PAINLEVEL_OUTOF10: 6

## 2023-03-23 NOTE — LETTER
[facility]    3/23/23    Patient: Thierry Abbott  YOB: 1965    Dear Americo Alcantara MD,    I had the pleasure of seeing one of your patients, Grciel Manzano today in the office today. Below are the relevant portions of my assessment and plan of care. IMPRESSION:  1. Urinary retention      PLAN:  Return in about 3 months (around 6/23/2023). 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 She is currently doing intermittent straight catheterization 3 times a day to empty her bladder. Discussed the options of an indwelling morocho catheter vs Suprapubic (SP) catheter or continued intermittent straight catheterization. For now, she wants to do the latter. Thank you for allowing me to participate in the care of this patient. I will keep you updated on this patient's follow up and I look forward to serving you and your patients again in the future. Tj Shepard MD, FACS

## 2023-03-23 NOTE — ED PROVIDER NOTES
[facility]  Emergency Department  Faculty Attestation     I performed a history and physical examination of the patient and discussed management with the resident. I reviewed the residents note and agree with the documented findings and plan of care. Any areas of disagreement are noted on the chart. I was personally present for the key portions of any procedures. I have documented in the chart those procedures where I was not present during the key portions. I have reviewed the emergency nurses triage note. I agree with the chief complaint, past medical history, past surgical history, allergies, medications, social and family history as documented unless otherwise noted below. For Physician Assistant/ Nurse Practitioner cases/documentation I have personally evaluated this patient and have completed at least one if not all key elements of the E/M (history, physical exam, and MDM). Additional findings are as noted.       Primary Care Physician:  Peyton Song MD    Screenings:  [unfilled]    CHIEF COMPLAINT       Chief Complaint   Patient presents with    Hyperglycemia     Was sent by HCP       RECENT VITALS:   Temp: 98.2 °F (36.8 °C),  Heart Rate: (!) 127, Resp: 16, BP: (!) 139/97    LABS:  Labs Reviewed   ELECTROLYTES PLUS - Abnormal; Notable for the following components:       Result Value    POC Sodium 120 (*)     POC Chloride 89 (*)     POC TCO2 15 (*)     Anion Gap 17 (*)     All other components within normal limits   CALCIUM, IONIC (POC) - Abnormal; Notable for the following components:    POC Ionized Calcium 1.06 (*)     All other components within normal limits   VENOUS BLOOD GAS, POINT OF CARE - Abnormal; Notable for the following components:    pCO2, Michael 27.5 (*)     pO2, Michael 23.5 (*)     HCO3, Venous 16.3 (*)     Negative Base Excess, Michael 7 (*)     O2 Sat, Michael 42 (*)     All other components within normal limits   UREA N (POC) - Abnormal; Notable for the
General: Normal range of motion. Cervical back: Normal range of motion. Comments: Use all extremities equally   Skin:     Capillary Refill: Capillary refill takes less than 2 seconds. Neurological:      Comments: Nonverbal, not following commands         DDX/DIAGNOSTIC RESULTS / EMERGENCY DEPARTMENT COURSE / MDM     Medical Decision Making  28-year-old female with type 2 diabetes presenting with altered mental status with blood glucose too high to read. According to patient's fiancé, she has been altered for the past 3 days and blood glucose have been too high to read since then as well. Vital signs notable for hypertension and tachypnea. Patient is afebrile, saturating 97% on room air. Hypertensive. Patient is participating little in examination or interview. Will admit intermittently answer \"yes\" or \"no\" but does not elaborate. According to patient's fiancé, she has had urinary retention. Physical examination showing tachycardic heart rate, normal heart sounds, lungs clear to auscultation bilaterally. Abdomen soft, nondistended nontender. Dry mucous membranes. Blood glucose in triage greater than 600. Considering HHS versus DKA. Also considering sepsis, will obtain full septic work-up. Also will administer IV fluids, obtain VBG, beta hydroxybutyrate, blood cultures, urinalysis, urine cultures. She is insulin naïve, so will administer low-dose of subcutaneous insulin, will start with 10 units. Patient is maintaining her airway although she is obtunded, does not require intubation at this time. Amount and/or Complexity of Data Reviewed  Labs: ordered. Decision-making details documented in ED Course. Radiology: ordered. ECG/medicine tests: ordered. Risk  Prescription drug management. Decision regarding hospitalization.       Sepsis Times and Checklist  Vital Signs: BP: (!) 158/92  Heart Rate: (!) 142  Resp: 30  Temp: 98.2 F SpO2: 95 %  SIRS (>2) Non Pregnant       Temp > 38.3C

## 2023-03-23 NOTE — PROGRESS NOTES
physician and radiology report verified)    PAST MEDICAL, FAMILY AND SOCIAL HISTORY:  Past Medical History:   Diagnosis Date    Anxiety     Chronic back pain     Hypertension     Mitral valve prolapse      Past Surgical History:   Procedure Laterality Date     SECTION      HYSTERECTOMY (CERVIX STATUS UNKNOWN)      TONSILLECTOMY       Family History   Problem Relation Age of Onset    Heart Disease Mother      Current Outpatient Medications   Medication Sig Dispense Refill    QUEtiapine (SEROQUEL) 200 MG tablet       metFORMIN (GLUCOPHAGE) 1000 MG tablet Take 1 tablet by mouth 2 times daily (with meals) 60 tablet 0    tamsulosin (FLOMAX) 0.4 MG capsule Take 1 capsule by mouth daily 30 capsule 3    Catheters (INCARE STRAIGHT 12FR/20CM) MISC 1 Units by Does not apply route every 4 hours Straight cath every 4-6 hours for Urinary retention over 400 mL 5 each 2    glucose monitoring (FREESTYLE FREEDOM) kit 1 kit by Does not apply route daily 1 kit 0    Lancets MISC 1 each by Does not apply route daily 100 each 3    blood glucose monitor strips Test 4 times a day & as needed for symptoms of irregular blood glucose. Dispense sufficient amount for indicated testing frequency plus additional to accommodate PRN testing needs. 100 strip 0    alogliptin (NESINA) 25 MG TABS tablet Take 1 tablet by mouth daily 30 tablet 1    Lancets MISC 1 each by Does not apply route 2 times daily 100 each 5    blood glucose monitor strips Test 2 times a day & as needed for symptoms of irregular blood glucose. Dispense sufficient amount for indicated testing frequency plus additional to accommodate PRN testing needs. 100 strip 0    lurasidone (LATUDA) 80 MG TABS tablet Take 80 mg by mouth daily      acetaminophen (TYLENOL) 325 MG tablet Take 2 tablets by mouth every 6 hours as needed for Pain 50 tablet 0    propranolol (INDERAL) 10 MG tablet Take 10 mg by mouth 2 times daily.       gabapentin (NEURONTIN) 100 MG capsule Take 100 mg

## 2023-03-24 PROBLEM — D64.9 ACUTE ANEMIA: Status: ACTIVE | Noted: 2023-03-24

## 2023-03-24 PROBLEM — R73.9 HYPERGLYCEMIA: Status: ACTIVE | Noted: 2023-03-24

## 2023-03-24 PROBLEM — A41.4 SEPTICEMIA DUE TO KLEBSIELLA PNEUMONIAE (HCC): Status: ACTIVE | Noted: 2023-03-24

## 2023-03-24 PROBLEM — N10 ACUTE PYELONEPHRITIS: Status: ACTIVE | Noted: 2023-03-24

## 2023-03-24 PROBLEM — E87.20 LACTIC ACIDOSIS: Status: ACTIVE | Noted: 2023-03-24

## 2023-03-24 PROBLEM — D72.825 BANDEMIA: Status: ACTIVE | Noted: 2023-03-24

## 2023-03-24 PROBLEM — E87.20 LACTIC ACID ACIDOSIS: Status: ACTIVE | Noted: 2023-03-24

## 2023-03-24 PROBLEM — E87.1 HYPONATREMIA: Status: ACTIVE | Noted: 2023-03-24

## 2023-03-24 PROBLEM — E83.51 HYPOCALCEMIA: Status: ACTIVE | Noted: 2023-03-24

## 2023-03-24 PROBLEM — E87.29 HIGH ANION GAP METABOLIC ACIDOSIS: Status: ACTIVE | Noted: 2023-03-24

## 2023-03-24 PROBLEM — R79.89 PSEUDOHYPONATREMIA: Status: ACTIVE | Noted: 2023-02-18

## 2023-03-24 PROBLEM — E11.00 TYPE 2 DIABETES MELLITUS WITH HYPEROSMOLAR NONKETOTIC HYPERGLYCEMIA (HCC): Status: ACTIVE | Noted: 2023-03-24

## 2023-03-24 PROBLEM — G93.41 ACUTE METABOLIC ENCEPHALOPATHY: Status: ACTIVE | Noted: 2023-03-24

## 2023-03-24 LAB
ABO/RH: NORMAL
ABSOLUTE EOS #: 0 K/UL (ref 0–0.4)
ABSOLUTE IMMATURE GRANULOCYTE: 0.34 K/UL (ref 0–0.3)
ABSOLUTE LYMPH #: 1.71 K/UL (ref 1–4.8)
ABSOLUTE MONO #: 1.36 K/UL (ref 0.1–0.8)
ALBUMIN SERPL-MCNC: 2.4 G/DL (ref 3.5–5.2)
ALBUMIN SERPL-MCNC: 2.6 G/DL (ref 3.5–5.2)
ALBUMIN/GLOBULIN RATIO: 0.6 (ref 1–2.5)
ALBUMIN/GLOBULIN RATIO: 0.6 (ref 1–2.5)
ALP SERPL-CCNC: 134 U/L (ref 35–104)
ALP SERPL-CCNC: 134 U/L (ref 35–104)
ALT SERPL-CCNC: 14 U/L (ref 5–33)
ALT SERPL-CCNC: 22 U/L (ref 5–33)
ANION GAP SERPL CALCULATED.3IONS-SCNC: 20 MMOL/L (ref 9–17)
ANTIBODY SCREEN: NEGATIVE
ARM BAND NUMBER: NORMAL
AST SERPL-CCNC: 16 U/L
AST SERPL-CCNC: 45 U/L
BASOPHILS # BLD: 0 % (ref 0–2)
BASOPHILS ABSOLUTE: 0 K/UL (ref 0–0.2)
BILIRUB DIRECT SERPL-MCNC: 0.3 MG/DL
BILIRUB INDIRECT SERPL-MCNC: 0.3 MG/DL (ref 0–1)
BILIRUB SERPL-MCNC: 0.6 MG/DL (ref 0.3–1.2)
BILIRUB SERPL-MCNC: 0.6 MG/DL (ref 0.3–1.2)
BUN SERPL-MCNC: 25 MG/DL (ref 6–20)
CALCIUM SERPL-MCNC: 8.2 MG/DL (ref 8.6–10.4)
CHLORIDE SERPL-SCNC: 99 MMOL/L (ref 98–107)
CO2 SERPL-SCNC: 16 MMOL/L (ref 20–31)
CREAT SERPL-MCNC: 0.88 MG/DL (ref 0.5–0.9)
EKG ATRIAL RATE: 102 BPM
EKG ATRIAL RATE: 117 BPM
EKG ATRIAL RATE: 119 BPM
EKG P AXIS: 34 DEGREES
EKG P AXIS: 34 DEGREES
EKG P AXIS: 35 DEGREES
EKG P-R INTERVAL: 128 MS
EKG P-R INTERVAL: 130 MS
EKG P-R INTERVAL: 130 MS
EKG Q-T INTERVAL: 314 MS
EKG Q-T INTERVAL: 320 MS
EKG Q-T INTERVAL: 354 MS
EKG QRS DURATION: 76 MS
EKG QRS DURATION: 78 MS
EKG QRS DURATION: 82 MS
EKG QTC CALCULATION (BAZETT): 441 MS
EKG QTC CALCULATION (BAZETT): 446 MS
EKG QTC CALCULATION (BAZETT): 461 MS
EKG R AXIS: -10 DEGREES
EKG R AXIS: -14 DEGREES
EKG R AXIS: -15 DEGREES
EKG T AXIS: 0 DEGREES
EKG T AXIS: 19 DEGREES
EKG VENTRICULAR RATE: 102 BPM
EKG VENTRICULAR RATE: 117 BPM
EKG VENTRICULAR RATE: 119 BPM
EOSINOPHILS RELATIVE PERCENT: 0 % (ref 1–4)
EST. AVERAGE GLUCOSE BLD GHB EST-MCNC: 269 MG/DL
EXPIRATION DATE: NORMAL
FDP: >5 UG/ML
FERRITIN SERPL-MCNC: 3106 NG/ML (ref 13–150)
FIBRINOGEN: 681 MG/DL (ref 203–521)
GFR SERPL CREATININE-BSD FRML MDRD: >60 ML/MIN/1.73M2
GLUCOSE BLD-MCNC: 176 MG/DL (ref 65–105)
GLUCOSE BLD-MCNC: 212 MG/DL (ref 65–105)
GLUCOSE BLD-MCNC: 272 MG/DL (ref 65–105)
GLUCOSE BLD-MCNC: 309 MG/DL (ref 65–105)
GLUCOSE BLD-MCNC: 387 MG/DL (ref 65–105)
GLUCOSE BLD-MCNC: >600 MG/DL (ref 65–105)
GLUCOSE BLD-MCNC: >600 MG/DL (ref 65–105)
GLUCOSE SERPL-MCNC: 414 MG/DL (ref 70–99)
HBA1C MFR BLD: 11 % (ref 4–6)
HCT VFR BLD AUTO: 30.1 % (ref 36.3–47.1)
HGB BLD-MCNC: 9.7 G/DL (ref 11.9–15.1)
IMMATURE GRANULOCYTES: 1 %
INR PPP: 1.3
LACTIC ACID, SEPSIS WHOLE BLOOD: 2 MMOL/L (ref 0.5–1.9)
LACTIC ACID, SEPSIS WHOLE BLOOD: 2 MMOL/L (ref 0.5–1.9)
LDLC SERPL-MCNC: 241 U/L (ref 135–214)
LYMPHOCYTES # BLD: 5 % (ref 24–44)
MAGNESIUM SERPL-MCNC: 1.7 MG/DL (ref 1.6–2.6)
MCH RBC QN AUTO: 26.7 PG (ref 25.2–33.5)
MCHC RBC AUTO-ENTMCNC: 32.2 G/DL (ref 28.4–34.8)
MCV RBC AUTO: 82.9 FL (ref 82.6–102.9)
MICROORGANISM SPEC CULT: ABNORMAL
MONOCYTES # BLD: 4 % (ref 1–7)
MORPHOLOGY: ABNORMAL
NRBC AUTOMATED: 0 PER 100 WBC
PARTIAL THROMBOPLASTIN TIME: 26.5 SEC (ref 23–36.5)
PDW BLD-RTO: 14.5 % (ref 11.8–14.4)
PLATELET # BLD AUTO: 385 K/UL (ref 138–453)
PMV BLD AUTO: 10.7 FL (ref 8.1–13.5)
POTASSIUM SERPL-SCNC: 3.3 MMOL/L (ref 3.7–5.3)
PROT SERPL-MCNC: 6.4 G/DL (ref 6.4–8.3)
PROT SERPL-MCNC: 6.7 G/DL (ref 6.4–8.3)
PROTHROMBIN TIME: 15.7 SEC (ref 11.7–14.9)
RBC # BLD: 3.63 M/UL (ref 3.95–5.11)
SEG NEUTROPHILS: 90 % (ref 36–66)
SEGMENTED NEUTROPHILS ABSOLUTE COUNT: 30.69 K/UL (ref 1.8–7.7)
SODIUM SERPL-SCNC: 135 MMOL/L (ref 135–144)
SPECIMEN DESCRIPTION: ABNORMAL
T3FREE SERPL-MCNC: 1.17 PG/ML (ref 2.02–4.43)
T4 FREE SERPL-MCNC: 1.27 NG/DL (ref 0.93–1.7)
TSH SERPL-ACNC: 0.17 UIU/ML (ref 0.3–5)
WBC # BLD AUTO: 34.1 K/UL (ref 3.5–11.3)

## 2023-03-24 PROCEDURE — 84439 ASSAY OF FREE THYROXINE: CPT

## 2023-03-24 PROCEDURE — 99222 1ST HOSP IP/OBS MODERATE 55: CPT | Performed by: INTERNAL MEDICINE

## 2023-03-24 PROCEDURE — 6370000000 HC RX 637 (ALT 250 FOR IP): Performed by: STUDENT IN AN ORGANIZED HEALTH CARE EDUCATION/TRAINING PROGRAM

## 2023-03-24 PROCEDURE — 6370000000 HC RX 637 (ALT 250 FOR IP)

## 2023-03-24 PROCEDURE — 83615 LACTATE (LD) (LDH) ENZYME: CPT

## 2023-03-24 PROCEDURE — 85730 THROMBOPLASTIN TIME PARTIAL: CPT

## 2023-03-24 PROCEDURE — 2580000003 HC RX 258: Performed by: STUDENT IN AN ORGANIZED HEALTH CARE EDUCATION/TRAINING PROGRAM

## 2023-03-24 PROCEDURE — 85025 COMPLETE CBC W/AUTO DIFF WBC: CPT

## 2023-03-24 PROCEDURE — 6370000000 HC RX 637 (ALT 250 FOR IP): Performed by: NURSE PRACTITIONER

## 2023-03-24 PROCEDURE — 99223 1ST HOSP IP/OBS HIGH 75: CPT | Performed by: INTERNAL MEDICINE

## 2023-03-24 PROCEDURE — 82947 ASSAY GLUCOSE BLOOD QUANT: CPT

## 2023-03-24 PROCEDURE — 85384 FIBRINOGEN ACTIVITY: CPT

## 2023-03-24 PROCEDURE — 85362 FIBRIN DEGRADATION PRODUCTS: CPT

## 2023-03-24 PROCEDURE — 6360000002 HC RX W HCPCS

## 2023-03-24 PROCEDURE — 83735 ASSAY OF MAGNESIUM: CPT

## 2023-03-24 PROCEDURE — 84481 FREE ASSAY (FT-3): CPT

## 2023-03-24 PROCEDURE — 2580000003 HC RX 258: Performed by: INTERNAL MEDICINE

## 2023-03-24 PROCEDURE — 82607 VITAMIN B-12: CPT

## 2023-03-24 PROCEDURE — 6360000002 HC RX W HCPCS: Performed by: INTERNAL MEDICINE

## 2023-03-24 PROCEDURE — 86901 BLOOD TYPING SEROLOGIC RH(D): CPT

## 2023-03-24 PROCEDURE — 84443 ASSAY THYROID STIM HORMONE: CPT

## 2023-03-24 PROCEDURE — 82728 ASSAY OF FERRITIN: CPT

## 2023-03-24 PROCEDURE — 86900 BLOOD TYPING SEROLOGIC ABO: CPT

## 2023-03-24 PROCEDURE — 6360000002 HC RX W HCPCS: Performed by: STUDENT IN AN ORGANIZED HEALTH CARE EDUCATION/TRAINING PROGRAM

## 2023-03-24 PROCEDURE — 6370000000 HC RX 637 (ALT 250 FOR IP): Performed by: INTERNAL MEDICINE

## 2023-03-24 PROCEDURE — 36415 COLL VENOUS BLD VENIPUNCTURE: CPT

## 2023-03-24 PROCEDURE — 85610 PROTHROMBIN TIME: CPT

## 2023-03-24 PROCEDURE — 93005 ELECTROCARDIOGRAM TRACING: CPT | Performed by: STUDENT IN AN ORGANIZED HEALTH CARE EDUCATION/TRAINING PROGRAM

## 2023-03-24 PROCEDURE — 87641 MR-STAPH DNA AMP PROBE: CPT

## 2023-03-24 PROCEDURE — 2580000003 HC RX 258

## 2023-03-24 PROCEDURE — 82746 ASSAY OF FOLIC ACID SERUM: CPT

## 2023-03-24 PROCEDURE — 80053 COMPREHEN METABOLIC PANEL: CPT

## 2023-03-24 PROCEDURE — 83605 ASSAY OF LACTIC ACID: CPT

## 2023-03-24 PROCEDURE — 2060000000 HC ICU INTERMEDIATE R&B

## 2023-03-24 PROCEDURE — 99221 1ST HOSP IP/OBS SF/LOW 40: CPT | Performed by: PSYCHIATRY & NEUROLOGY

## 2023-03-24 PROCEDURE — 86850 RBC ANTIBODY SCREEN: CPT

## 2023-03-24 PROCEDURE — 2580000003 HC RX 258: Performed by: NURSE PRACTITIONER

## 2023-03-24 PROCEDURE — 51702 INSERT TEMP BLADDER CATH: CPT

## 2023-03-24 PROCEDURE — 80076 HEPATIC FUNCTION PANEL: CPT

## 2023-03-24 RX ORDER — POTASSIUM CHLORIDE 7.45 MG/ML
10 INJECTION INTRAVENOUS PRN
Status: DISCONTINUED | OUTPATIENT
Start: 2023-03-24 | End: 2023-03-28 | Stop reason: HOSPADM

## 2023-03-24 RX ORDER — INSULIN GLARGINE 100 [IU]/ML
10 INJECTION, SOLUTION SUBCUTANEOUS ONCE
Status: COMPLETED | OUTPATIENT
Start: 2023-03-24 | End: 2023-03-24

## 2023-03-24 RX ORDER — INSULIN LISPRO 100 [IU]/ML
0-4 INJECTION, SOLUTION INTRAVENOUS; SUBCUTANEOUS NIGHTLY
Status: DISCONTINUED | OUTPATIENT
Start: 2023-03-24 | End: 2023-03-28 | Stop reason: HOSPADM

## 2023-03-24 RX ORDER — MAGNESIUM SULFATE 1 G/100ML
1000 INJECTION INTRAVENOUS PRN
Status: DISCONTINUED | OUTPATIENT
Start: 2023-03-24 | End: 2023-03-28 | Stop reason: HOSPADM

## 2023-03-24 RX ORDER — 0.9 % SODIUM CHLORIDE 0.9 %
1000 INTRAVENOUS SOLUTION INTRAVENOUS ONCE
Status: COMPLETED | OUTPATIENT
Start: 2023-03-24 | End: 2023-03-24

## 2023-03-24 RX ORDER — INSULIN GLARGINE 100 [IU]/ML
20 INJECTION, SOLUTION SUBCUTANEOUS ONCE
Status: COMPLETED | OUTPATIENT
Start: 2023-03-24 | End: 2023-03-24

## 2023-03-24 RX ORDER — HEPARIN SODIUM 5000 [USP'U]/ML
5000 INJECTION, SOLUTION INTRAVENOUS; SUBCUTANEOUS EVERY 8 HOURS SCHEDULED
Status: DISCONTINUED | OUTPATIENT
Start: 2023-03-24 | End: 2023-03-28 | Stop reason: HOSPADM

## 2023-03-24 RX ORDER — INSULIN LISPRO 100 [IU]/ML
0-16 INJECTION, SOLUTION INTRAVENOUS; SUBCUTANEOUS
Status: DISCONTINUED | OUTPATIENT
Start: 2023-03-24 | End: 2023-03-28 | Stop reason: HOSPADM

## 2023-03-24 RX ADMIN — CEFTRIAXONE SODIUM 2000 MG: 10 INJECTION, POWDER, FOR SOLUTION INTRAVENOUS at 16:26

## 2023-03-24 RX ADMIN — PROPRANOLOL HYDROCHLORIDE 10 MG: 10 TABLET ORAL at 00:29

## 2023-03-24 RX ADMIN — SODIUM CHLORIDE 1000 ML: 9 INJECTION, SOLUTION INTRAVENOUS at 01:22

## 2023-03-24 RX ADMIN — INSULIN GLARGINE 20 UNITS: 100 INJECTION, SOLUTION SUBCUTANEOUS at 10:28

## 2023-03-24 RX ADMIN — CEFEPIME 2000 MG: 2 INJECTION, POWDER, FOR SOLUTION INTRAVENOUS at 08:26

## 2023-03-24 RX ADMIN — PIPERACILLIN AND TAZOBACTAM 3375 MG: 3; .375 INJECTION, POWDER, FOR SOLUTION INTRAVENOUS at 10:37

## 2023-03-24 RX ADMIN — SODIUM CHLORIDE, PRESERVATIVE FREE 10 ML: 5 INJECTION INTRAVENOUS at 20:33

## 2023-03-24 RX ADMIN — POTASSIUM CHLORIDE 10 MEQ: 7.46 INJECTION, SOLUTION INTRAVENOUS at 14:58

## 2023-03-24 RX ADMIN — HEPARIN SODIUM 5000 UNITS: 5000 INJECTION INTRAVENOUS; SUBCUTANEOUS at 08:26

## 2023-03-24 RX ADMIN — POTASSIUM CHLORIDE 10 MEQ: 7.46 INJECTION, SOLUTION INTRAVENOUS at 11:47

## 2023-03-24 RX ADMIN — SODIUM CHLORIDE: 9 INJECTION, SOLUTION INTRAVENOUS at 10:35

## 2023-03-24 RX ADMIN — INSULIN GLARGINE 10 UNITS: 100 INJECTION, SOLUTION SUBCUTANEOUS at 01:21

## 2023-03-24 RX ADMIN — SODIUM CHLORIDE: 9 INJECTION, SOLUTION INTRAVENOUS at 13:16

## 2023-03-24 RX ADMIN — HEPARIN SODIUM 5000 UNITS: 5000 INJECTION INTRAVENOUS; SUBCUTANEOUS at 13:16

## 2023-03-24 RX ADMIN — INSULIN LISPRO 8 UNITS: 100 INJECTION, SOLUTION INTRAVENOUS; SUBCUTANEOUS at 11:47

## 2023-03-24 RX ADMIN — ACETAMINOPHEN 650 MG: 325 TABLET ORAL at 20:33

## 2023-03-24 RX ADMIN — INSULIN LISPRO 8 UNITS: 100 INJECTION, SOLUTION INTRAVENOUS; SUBCUTANEOUS at 08:31

## 2023-03-24 RX ADMIN — SODIUM CHLORIDE, PRESERVATIVE FREE 10 ML: 5 INJECTION INTRAVENOUS at 08:27

## 2023-03-24 RX ADMIN — VANCOMYCIN HYDROCHLORIDE 1000 MG: 10 INJECTION, POWDER, LYOPHILIZED, FOR SOLUTION INTRAVENOUS at 03:49

## 2023-03-24 RX ADMIN — PROPRANOLOL HYDROCHLORIDE 10 MG: 10 TABLET ORAL at 20:33

## 2023-03-24 RX ADMIN — SODIUM CHLORIDE 150 ML/HR: 9 INJECTION, SOLUTION INTRAVENOUS at 20:18

## 2023-03-24 RX ADMIN — TAMSULOSIN HYDROCHLORIDE 0.4 MG: 0.4 CAPSULE ORAL at 08:28

## 2023-03-24 RX ADMIN — HEPARIN SODIUM 5000 UNITS: 5000 INJECTION INTRAVENOUS; SUBCUTANEOUS at 21:51

## 2023-03-24 RX ADMIN — ACETAMINOPHEN 650 MG: 325 TABLET ORAL at 00:29

## 2023-03-24 RX ADMIN — POTASSIUM CHLORIDE 10 MEQ: 7.46 INJECTION, SOLUTION INTRAVENOUS at 13:15

## 2023-03-24 RX ADMIN — VANCOMYCIN HYDROCHLORIDE 1000 MG: 10 INJECTION, POWDER, LYOPHILIZED, FOR SOLUTION INTRAVENOUS at 14:57

## 2023-03-24 RX ADMIN — POTASSIUM CHLORIDE 10 MEQ: 7.46 INJECTION, SOLUTION INTRAVENOUS at 10:51

## 2023-03-24 RX ADMIN — PROPRANOLOL HYDROCHLORIDE 10 MG: 10 TABLET ORAL at 08:26

## 2023-03-24 ASSESSMENT — PAIN SCALES - GENERAL: PAINLEVEL_OUTOF10: 0

## 2023-03-24 NOTE — ED NOTES
Bella Holt RN at bedside for ultrasound IV access.      John Rosales RN  03/23/23 2860
Dr. Agus Ames at bedside to obtain blood for labs from femoral tap. Ultrasound guidance used. 10ml obtained.      Ruthy Hernandez RN  03/23/23 6492
Neuro at bedside to assess     Nyla Newman RN  03/23/23 9260
Pt brought to ED by maryjane. Pt was recently diagnosed with diabetes a month ago. Pt has been monitoring blood sugars and taking medications but bladimirsterling states they have not been able to get a good reading on her monitor for a while. Bennie Stone states that pt has been weak, tired, and having symptoms of urinary retention/incontinence for the last week. Pt was supposed to see her urologist today but maryjane brought her to ED instead. Pt is alert and oriented. Pt connected to monitor. IVs placed. Bedside VBG and POC glucose done. Fluids started.      JHONATAN Sanchez RN  03/23/23 1480
Pt is less lethargic. Pt still moving extremities and repositioning self in bed. Pt does have an active continuous tremor though out upper body, pt states that this is not new onset. Pt rarely responds to writers questions verbally but will nod yes and no.        Yovani Lipscomb RN  03/23/23 4487
Pt now attempting to get out of bed. Pt is redirectable but slow to follow commands.       Alka Hernandez RN  03/23/23 3865
Pt resting in stretcher. Pt alert, but does not respond verbally to questions. Pt only speaks if given direct phrases to say. Pt does follow commands.      Nash Duke RN  03/23/23 1950
plus additional to accommodate PRN testing needs. CATHETERS (INCARE STRAIGHT 12FR/20CM) MISC    1 Units by Does not apply route every 4 hours Straight cath every 4-6 hours for Urinary retention over 400 mL    ELASTIC BANDAGES & SUPPORTS (LUMBAR BACK BRACE/SUPPORT PAD) MISC    1 each by Does not apply route daily as needed. GABAPENTIN (NEURONTIN) 100 MG CAPSULE    Take 100 mg by mouth 2 times daily. GLUCOSE MONITORING (FREESTYLE FREEDOM) KIT    1 kit by Does not apply route daily    LANCETS MISC    1 each by Does not apply route 2 times daily    LANCETS MISC    1 each by Does not apply route daily    LURASIDONE (LATUDA) 80 MG TABS TABLET    Take 80 mg by mouth daily    METFORMIN (GLUCOPHAGE) 1000 MG TABLET    Take 1 tablet by mouth 2 times daily (with meals)    PROPRANOLOL (INDERAL) 10 MG TABLET    Take 10 mg by mouth 2 times daily.     QUETIAPINE (SEROQUEL) 200 MG TABLET        TAMSULOSIN (FLOMAX) 0.4 MG CAPSULE    Take 1 capsule by mouth daily     Orders Placed This Encounter   Medications    0.9 % sodium chloride bolus    insulin lispro (HUMALOG) injection vial 10 Units    lactated ringers bolus    vancomycin (VANCOCIN) 1000 mg in sodium chloride 0.9% 250 mL IVPB     Order Specific Question:   Antimicrobial Indications     Answer:   Sepsis of Unknown Etiology    cefepime (MAXIPIME) 2,000 mg in sodium chloride 0.9 % 50 mL IVPB (mini-bag)     Order Specific Question:   Antimicrobial Indications     Answer:   Sepsis of Unknown Etiology    iopamidol (ISOVUE-370) 76 % injection 75 mL    lactated ringers bolus    insulin lispro (HUMALOG) injection vial 10 Units       SURGICAL HISTORY       Past Surgical History:   Procedure Laterality Date     SECTION      HYSTERECTOMY (CERVIX STATUS UNKNOWN)  2008    TONSILLECTOMY         PAST MEDICAL HISTORY       Past Medical History:   Diagnosis Date    Anxiety     Chronic back pain     Hypertension     Mitral valve prolapse        Labs:  Labs

## 2023-03-24 NOTE — H&P
Motrin-avoid    Sinus tachycardia secondary to dehydration-continue rehydration and monitor the heart rate    Leukocytosis secondary to infection-continue to monitor    On subcutaneous heparin  With the patient showing improvement since initial evaluation, the ICU team discussed with the [facility] attending and it was decided to keep the patient in [facility] and continue monitoring rather than transfer to the ICU. Please call us if there is any change of status to warrant admission to the ICU.         Gerardo Sharp MD  3/24/2023  8:29 AM
(37.8 °C), Min:98.2 °F (36.8 °C), Max:103.3 °F (39.6 °C)      Ins/Outs: In: 1000   Out: 1500 [Urine:1500]    PHYSICAL EXAM:  Constitutional: Appears confused, dehydrated, tachycardic. EENT: PERRLA, EOMI,neck supple with midline trachea. Neck: Supple, symmetrical, trachea midline, no adenopathy, thyroid symmetric, no jvd skin normal  Respiratory: clear to auscultation, no wheezes or rales and unlabored breathing.  No intercostal tenderness  Cardiovascular: regular rate and rhythm, normal S1, S2, no murmur noted and 2+ pulses throughout  Abdomen: soft, nontender, slightly distended, no masses or organomegaly  Extremities:  peripheral pulses normal, no pedal edema, no clubbing or cyanosis    MEDICATIONS:  Scheduled Meds:   heparin (porcine)  5,000 Units SubCUTAneous 3 times per day    piperacillin-tazobactam  3,375 mg IntraVENous Q8H    insulin lispro  0-16 Units SubCUTAneous TID WC    insulin lispro  0-4 Units SubCUTAneous Nightly    tamsulosin  0.4 mg Oral Daily    propranolol  10 mg Oral BID    sodium chloride flush  5-40 mL IntraVENous 2 times per day    [Held by provider] amLODIPine  10 mg Oral Daily    And    [Held by provider] lisinopril  20 mg Oral Daily       Continuous Infusions:   dextrose      sodium chloride 20 mL/hr at 03/24/23 1035    sodium chloride 150 mL/hr at 03/24/23 1316       PRN Meds:   potassium chloride, 10 mEq, PRN  magnesium sulfate, 1,000 mg, PRN  glucose, 4 tablet, PRN  dextrose bolus, 125 mL, PRN   Or  dextrose bolus, 250 mL, PRN  glucagon (rDNA), 1 mg, PRN  dextrose, , Continuous PRN  sodium chloride flush, 5-40 mL, PRN  sodium chloride, , PRN  acetaminophen, 650 mg, Q6H PRN   Or  acetaminophen, 650 mg, Q6H PRN      SUPPORT DEVICES: [] Ventilator [] BIPAP  [x] Nasal Cannula [] Room Air       Additional Respiratory Assessments  Heart Rate: (!) 101  Resp: 28  SpO2: 97 %  No results found for: IFIO2, MODE, SETTIDVOL, SETPEEP    Complete Blood Count:   Recent Labs     03/23/23  2867
treatment  Cardiovascular:  Hemodynamically stable  MAP goal >65  Patient was tachycardic and dehydrated  2 L of IVF  Currently on maintenance NS at 150 cc  Sepsis due to UTI versus ? thyroid storm  Monitor BP and heart rate  Resume home propranolol  Pulmonary:  Maintain oxygen sats >92%  Pulmonary toilet   Currently saturating well on room air  Monitor airway  GI/Nutrition  dulcolax suppository every other evening and daily documentation of bowel movement  Ulcer Prophylaxis: H2 blockers   Diet:ADULT DIET; Regular; 4 carb choices (60 gm/meal); Low Fat/Low Chol/High Fiber/JEANETTE  Renal/Fluid/Electrolyte  IV Fluids: 0.9NS @ 150 mL/Hr   I/O: No intake/output data recorded.   Monitor electrolytes, replace PRN   Acute kidney injury  Creatinine daily  ID  WBC:   Lab Results   Component Value Date    WBC 43.1 (HH) 2023     Tmax: Temp (24hrs), Av.8 °F (38.2 °C), Min:98.2 °F (36.8 °C), Max:103.3 °F (39.6 °C)  Sepsis due to UTI  Antimicrobials: Vanco and Zosyn  Hematology:  Recent Labs     23  1807   HGB 9.7*    stable  Endocrine:   glucose controlled - most recent BGL is   Recent Labs     23  1900 23  2134 23  2255   GLUCOSE [phone]   Patient admitted with hyperglycemia and dehydration  Lantus 10 units once  Medium dose sliding scale  POCT glucose 4 times daily  Hypoglycemia protocol  Patient is tachycardic, febrile, hyperglycemic, confused  TSH 0.17  Check free T4,t3  Continue on Inderal 10 mg twice daily    DVT Prophylaxis  lovenox  Discharge Needs:  PT, OT, ST, SW, and Case Management      CODE STATUS: Full Code      DISPOSITION:  [x] To remain ICU:     Patient discussed with Dr. Rosibel Sahu MD   Internal Medicine - PGY-2  Intensive Care Unit  3/24/2023, 12:59 AM
symptoms as outlined, requirement for current medical therapies and most importantly because of direct risk to patient if care not provided in a hospital setting. Expected length of stay > 48 hours.     Jenny Schaumann, MD  3/24/2023  1:15 AM    Copy sent to Dr. Jelly Ayala MD

## 2023-03-24 NOTE — CONSULTS
Units SubCUTAneous Nightly    tamsulosin  0.4 mg Oral Daily    propranolol  10 mg Oral BID    sodium chloride flush  5-40 mL IntraVENous 2 times per day    [Held by provider] amLODIPine  10 mg Oral Daily    And    [Held by provider] lisinopril  20 mg Oral Daily       Social History:     Social History     Socioeconomic History    Marital status: Legally      Spouse name: Not on file    Number of children: Not on file    Years of education: Not on file    Highest education level: Not on file   Occupational History    Not on file   Tobacco Use    Smoking status: Never    Smokeless tobacco: Not on file   Substance and Sexual Activity    Alcohol use: No    Drug use: No    Sexual activity: Not on file   Other Topics Concern    Not on file   Social History Narrative    Not on file     Social Determinants of Health     Financial Resource Strain: Not on file   Food Insecurity: Not on file   Transportation Needs: Not on file   Physical Activity: Not on file   Stress: Not on file   Social Connections: Not on file   Intimate Partner Violence: Not on file   Housing Stability: Not on file       Family History:     Family History   Problem Relation Age of Onset    Heart Disease Mother         Allergies:   Motrin [ibuprofen micronized]     Review of Systems:   Constitutional: No fevers or chills. Tired and not feeling well. Head: No headaches  Eyes: No double vision or blurry vision. No conjunctival inflammation. ENT: No sore throat or runny nose. . No hearing loss, tinnitus or vertigo. Cardiovascular: No chest pain or palpitations. No shortness of breath. No KELLEY  Lung: No shortness of breath or cough. No sputum production  Abdomen: No nausea, vomiting, diarrhea, or abdominal pain. Paullette Rakes No cramps. Genitourinary: No increased urinary frequency, or dysuria. No hematuria. No suprapubic or CVA pain. Urinary retention. Musculoskeletal: No muscle aches or pains.  No joint effusions, swelling or deformities  Hematologic: No
data filed at 3/24/2023 0400  Gross per 24 hour   Intake 1000 ml   Output 1500 ml   Net -500 ml         Neurologic Exam    Mental status   Awake. Sitting in the bed. Confused. Answer question in yes or no. Trying to get out of bed. Spontaneously moving all four extremity. Cranial nerves   Pupils 2 mm and reactive. Able to track and cross midline. Motor function  Spontaneously moving all four extremity   Sensory function Symmetric to touch. Cerebellar Limited eval due to confusion. Reflex function Not tested   Gait                  Not assessed       Investigations:      Laboratory Testing:  Recent Results (from the past 24 hour(s))   POCT Urinalysis No Micro (Auto)    Collection Time: 03/23/23  2:00 PM   Result Value Ref Range    Color, UA Yellow     Clarity, UA Cloudy     Glucose, UA POC Negative     Bilirubin, UA Negative     Ketones, UA 40 mg/dL     Spec Grav, UA 1.020     Blood, UA POC Moderate     pH, UA 5.5     Protein, UA POC Trace     Urobilinogen, UA 0.2     Leukocytes, UA Negative     Nitrite, UA Negative    POCT Glucose    Collection Time: 03/23/23  3:52 PM   Result Value Ref Range    Glucose  mg/dL    QC OK?  yes    Venous Blood Gas, POC    Collection Time: 03/23/23  4:06 PM   Result Value Ref Range    pH, Michael 7.381 7.320 - 7.430    pCO2, Michael 27.5 (L) 41.0 - 51.0 mm Hg    pO2, Michael 23.5 (L) 30.0 - 50.0 mm Hg    HCO3, Venous 16.3 (L) 22.0 - 29.0 mmol/L    Negative Base Excess, Michael 7 (H) 0.0 - 2.0    O2 Sat, Michael 42 (L) 60.0 - 85.0 %   ELECTROLYTES PLUS    Collection Time: 03/23/23  4:06 PM   Result Value Ref Range    POC Sodium 120 (L) 138 - 146 mmol/L    POC Potassium 4.2 3.5 - 4.5 mmol/L    POC Chloride 89 (L) 98 - 107 mmol/L    POC TCO2 15 (L) 22 - 30 mmol/L    Anion Gap 17 (H) 7 - 16 mmol/L   Hemoglobin and hematocrit, blood    Collection Time: 03/23/23  4:06 PM   Result Value Ref Range    POC Hemoglobin 15.7 12.0 - 16.0 g/dL    POC Hematocrit 46 36 - 46 %   Creatinine W/GFR Point of Care

## 2023-03-25 LAB
GLUCOSE BLD-MCNC: 152 MG/DL (ref 65–105)
GLUCOSE BLD-MCNC: 187 MG/DL (ref 65–105)
GLUCOSE BLD-MCNC: 202 MG/DL (ref 65–105)
GLUCOSE BLD-MCNC: 249 MG/DL (ref 65–105)
IRON SATURATION: 53 % (ref 20–55)
IRON SERPL-MCNC: 57 UG/DL (ref 37–145)
MICROORGANISM SPEC CULT: ABNORMAL
MRSA, DNA, NASAL: NEGATIVE
SERVICE CMNT-IMP: ABNORMAL
SPECIMEN DESCRIPTION: ABNORMAL
SPECIMEN DESCRIPTION: NORMAL
TIBC SERPL-MCNC: 107 UG/DL (ref 250–450)
UNSATURATED IRON BINDING CAPACITY: 50 UG/DL (ref 112–347)

## 2023-03-25 PROCEDURE — 83550 IRON BINDING TEST: CPT

## 2023-03-25 PROCEDURE — 83540 ASSAY OF IRON: CPT

## 2023-03-25 PROCEDURE — 6370000000 HC RX 637 (ALT 250 FOR IP): Performed by: INTERNAL MEDICINE

## 2023-03-25 PROCEDURE — 2060000000 HC ICU INTERMEDIATE R&B

## 2023-03-25 PROCEDURE — 99232 SBSQ HOSP IP/OBS MODERATE 35: CPT | Performed by: INTERNAL MEDICINE

## 2023-03-25 PROCEDURE — 2580000003 HC RX 258: Performed by: INTERNAL MEDICINE

## 2023-03-25 PROCEDURE — 6370000000 HC RX 637 (ALT 250 FOR IP): Performed by: STUDENT IN AN ORGANIZED HEALTH CARE EDUCATION/TRAINING PROGRAM

## 2023-03-25 PROCEDURE — 6360000002 HC RX W HCPCS

## 2023-03-25 PROCEDURE — 36415 COLL VENOUS BLD VENIPUNCTURE: CPT

## 2023-03-25 PROCEDURE — 99231 SBSQ HOSP IP/OBS SF/LOW 25: CPT | Performed by: PSYCHIATRY & NEUROLOGY

## 2023-03-25 PROCEDURE — 97162 PT EVAL MOD COMPLEX 30 MIN: CPT

## 2023-03-25 PROCEDURE — 87324 CLOSTRIDIUM AG IA: CPT

## 2023-03-25 PROCEDURE — 6360000002 HC RX W HCPCS: Performed by: INTERNAL MEDICINE

## 2023-03-25 PROCEDURE — 82947 ASSAY GLUCOSE BLOOD QUANT: CPT

## 2023-03-25 PROCEDURE — 6370000000 HC RX 637 (ALT 250 FOR IP): Performed by: NURSE PRACTITIONER

## 2023-03-25 PROCEDURE — 2580000003 HC RX 258: Performed by: NURSE PRACTITIONER

## 2023-03-25 PROCEDURE — 97530 THERAPEUTIC ACTIVITIES: CPT

## 2023-03-25 PROCEDURE — 87449 NOS EACH ORGANISM AG IA: CPT

## 2023-03-25 RX ORDER — QUETIAPINE FUMARATE 200 MG/1
200 TABLET, FILM COATED ORAL EVERY EVENING
Status: DISCONTINUED | OUTPATIENT
Start: 2023-03-25 | End: 2023-03-28 | Stop reason: HOSPADM

## 2023-03-25 RX ORDER — ZOLPIDEM TARTRATE 5 MG/1
5 TABLET ORAL NIGHTLY PRN
Status: DISCONTINUED | OUTPATIENT
Start: 2023-03-25 | End: 2023-03-28 | Stop reason: HOSPADM

## 2023-03-25 RX ORDER — ALOGLIPTIN 12.5 MG/1
25 TABLET, FILM COATED ORAL DAILY
Status: DISCONTINUED | OUTPATIENT
Start: 2023-03-25 | End: 2023-03-28 | Stop reason: HOSPADM

## 2023-03-25 RX ORDER — INSULIN GLARGINE 100 [IU]/ML
15 INJECTION, SOLUTION SUBCUTANEOUS DAILY
Status: DISCONTINUED | OUTPATIENT
Start: 2023-03-25 | End: 2023-03-28 | Stop reason: HOSPADM

## 2023-03-25 RX ORDER — ALPRAZOLAM 0.5 MG/1
0.5 TABLET ORAL 3 TIMES DAILY PRN
Status: DISCONTINUED | OUTPATIENT
Start: 2023-03-25 | End: 2023-03-28 | Stop reason: HOSPADM

## 2023-03-25 RX ADMIN — ZOLPIDEM TARTRATE 5 MG: 5 TABLET ORAL at 21:39

## 2023-03-25 RX ADMIN — PROPRANOLOL HYDROCHLORIDE 10 MG: 10 TABLET ORAL at 19:36

## 2023-03-25 RX ADMIN — ALOGLIPTIN 25 MG: 12.5 TABLET, FILM COATED ORAL at 12:27

## 2023-03-25 RX ADMIN — INSULIN LISPRO 4 UNITS: 100 INJECTION, SOLUTION INTRAVENOUS; SUBCUTANEOUS at 12:28

## 2023-03-25 RX ADMIN — HEPARIN SODIUM 5000 UNITS: 5000 INJECTION INTRAVENOUS; SUBCUTANEOUS at 15:10

## 2023-03-25 RX ADMIN — INSULIN GLARGINE 15 UNITS: 100 INJECTION, SOLUTION SUBCUTANEOUS at 15:11

## 2023-03-25 RX ADMIN — INSULIN LISPRO 4 UNITS: 100 INJECTION, SOLUTION INTRAVENOUS; SUBCUTANEOUS at 17:03

## 2023-03-25 RX ADMIN — QUETIAPINE FUMARATE 200 MG: 200 TABLET ORAL at 17:03

## 2023-03-25 RX ADMIN — TAMSULOSIN HYDROCHLORIDE 0.4 MG: 0.4 CAPSULE ORAL at 08:32

## 2023-03-25 RX ADMIN — SODIUM CHLORIDE: 9 INJECTION, SOLUTION INTRAVENOUS at 15:14

## 2023-03-25 RX ADMIN — CEFTRIAXONE SODIUM 2000 MG: 10 INJECTION, POWDER, FOR SOLUTION INTRAVENOUS at 15:09

## 2023-03-25 RX ADMIN — HEPARIN SODIUM 5000 UNITS: 5000 INJECTION INTRAVENOUS; SUBCUTANEOUS at 05:27

## 2023-03-25 RX ADMIN — PROPRANOLOL HYDROCHLORIDE 10 MG: 10 TABLET ORAL at 08:32

## 2023-03-25 RX ADMIN — SODIUM CHLORIDE 150 ML/HR: 9 INJECTION, SOLUTION INTRAVENOUS at 03:03

## 2023-03-25 RX ADMIN — SODIUM CHLORIDE: 9 INJECTION, SOLUTION INTRAVENOUS at 08:33

## 2023-03-25 RX ADMIN — SODIUM CHLORIDE, PRESERVATIVE FREE 10 ML: 5 INJECTION INTRAVENOUS at 19:36

## 2023-03-25 RX ADMIN — HEPARIN SODIUM 5000 UNITS: 5000 INJECTION INTRAVENOUS; SUBCUTANEOUS at 21:37

## 2023-03-25 ASSESSMENT — ENCOUNTER SYMPTOMS
RESPIRATORY NEGATIVE: 1
GASTROINTESTINAL NEGATIVE: 1

## 2023-03-26 LAB
C DIFF GDH + TOXINS A+B STL QL IA.RAPID: NEGATIVE
GLUCOSE BLD-MCNC: 130 MG/DL (ref 65–105)
GLUCOSE BLD-MCNC: 137 MG/DL (ref 65–105)
GLUCOSE BLD-MCNC: 158 MG/DL (ref 65–105)
GLUCOSE BLD-MCNC: 176 MG/DL (ref 65–105)
MICROORGANISM SPEC CULT: ABNORMAL
SERVICE CMNT-IMP: ABNORMAL
SPECIMEN DESCRIPTION: ABNORMAL
SPECIMEN DESCRIPTION: NORMAL

## 2023-03-26 PROCEDURE — 2580000003 HC RX 258: Performed by: NURSE PRACTITIONER

## 2023-03-26 PROCEDURE — 2580000003 HC RX 258: Performed by: INTERNAL MEDICINE

## 2023-03-26 PROCEDURE — 99232 SBSQ HOSP IP/OBS MODERATE 35: CPT | Performed by: INTERNAL MEDICINE

## 2023-03-26 PROCEDURE — 99231 SBSQ HOSP IP/OBS SF/LOW 25: CPT | Performed by: PSYCHIATRY & NEUROLOGY

## 2023-03-26 PROCEDURE — 2060000000 HC ICU INTERMEDIATE R&B

## 2023-03-26 PROCEDURE — 6370000000 HC RX 637 (ALT 250 FOR IP): Performed by: NURSE PRACTITIONER

## 2023-03-26 PROCEDURE — 97535 SELF CARE MNGMENT TRAINING: CPT

## 2023-03-26 PROCEDURE — 82947 ASSAY GLUCOSE BLOOD QUANT: CPT

## 2023-03-26 PROCEDURE — 6360000002 HC RX W HCPCS

## 2023-03-26 PROCEDURE — 6370000000 HC RX 637 (ALT 250 FOR IP): Performed by: INTERNAL MEDICINE

## 2023-03-26 PROCEDURE — 6360000002 HC RX W HCPCS: Performed by: INTERNAL MEDICINE

## 2023-03-26 PROCEDURE — 97166 OT EVAL MOD COMPLEX 45 MIN: CPT

## 2023-03-26 RX ADMIN — PROPRANOLOL HYDROCHLORIDE 10 MG: 10 TABLET ORAL at 08:14

## 2023-03-26 RX ADMIN — HEPARIN SODIUM 5000 UNITS: 5000 INJECTION INTRAVENOUS; SUBCUTANEOUS at 21:08

## 2023-03-26 RX ADMIN — ALPRAZOLAM 0.5 MG: 0.5 TABLET ORAL at 05:30

## 2023-03-26 RX ADMIN — QUETIAPINE FUMARATE 200 MG: 200 TABLET ORAL at 17:55

## 2023-03-26 RX ADMIN — SODIUM CHLORIDE: 9 INJECTION, SOLUTION INTRAVENOUS at 01:14

## 2023-03-26 RX ADMIN — SODIUM CHLORIDE: 9 INJECTION, SOLUTION INTRAVENOUS at 08:13

## 2023-03-26 RX ADMIN — ACETAMINOPHEN 650 MG: 325 TABLET ORAL at 15:12

## 2023-03-26 RX ADMIN — TAMSULOSIN HYDROCHLORIDE 0.4 MG: 0.4 CAPSULE ORAL at 08:13

## 2023-03-26 RX ADMIN — HEPARIN SODIUM 5000 UNITS: 5000 INJECTION INTRAVENOUS; SUBCUTANEOUS at 05:30

## 2023-03-26 RX ADMIN — INSULIN GLARGINE 15 UNITS: 100 INJECTION, SOLUTION SUBCUTANEOUS at 08:14

## 2023-03-26 RX ADMIN — SODIUM CHLORIDE, PRESERVATIVE FREE 10 ML: 5 INJECTION INTRAVENOUS at 21:09

## 2023-03-26 RX ADMIN — ALOGLIPTIN 25 MG: 12.5 TABLET, FILM COATED ORAL at 08:13

## 2023-03-26 RX ADMIN — ZOLPIDEM TARTRATE 5 MG: 5 TABLET ORAL at 21:07

## 2023-03-26 RX ADMIN — ACETAMINOPHEN 650 MG: 325 TABLET ORAL at 05:01

## 2023-03-26 RX ADMIN — PROPRANOLOL HYDROCHLORIDE 10 MG: 10 TABLET ORAL at 21:07

## 2023-03-26 RX ADMIN — ALPRAZOLAM 0.5 MG: 0.5 TABLET ORAL at 12:30

## 2023-03-26 RX ADMIN — HEPARIN SODIUM 5000 UNITS: 5000 INJECTION INTRAVENOUS; SUBCUTANEOUS at 15:12

## 2023-03-26 RX ADMIN — ALPRAZOLAM 0.5 MG: 0.5 TABLET ORAL at 21:08

## 2023-03-26 RX ADMIN — CEFTRIAXONE SODIUM 2000 MG: 10 INJECTION, POWDER, FOR SOLUTION INTRAVENOUS at 15:12

## 2023-03-26 ASSESSMENT — PAIN SCALES - GENERAL: PAINLEVEL_OUTOF10: 0

## 2023-03-27 LAB
ALBUMIN SERPL-MCNC: 2.2 G/DL (ref 3.5–5.2)
ANION GAP SERPL CALCULATED.3IONS-SCNC: 12 MMOL/L (ref 9–17)
BUN SERPL-MCNC: 5 MG/DL (ref 6–20)
CALCIUM SERPL-MCNC: 7.4 MG/DL (ref 8.6–10.4)
CHLORIDE SERPL-SCNC: 108 MMOL/L (ref 98–107)
CO2 SERPL-SCNC: 22 MMOL/L (ref 20–31)
CREAT SERPL-MCNC: 0.58 MG/DL (ref 0.5–0.9)
CRP SERPL HS-MCNC: 99.6 MG/L (ref 0–5)
GFR SERPL CREATININE-BSD FRML MDRD: >60 ML/MIN/1.73M2
GLUCOSE BLD-MCNC: 139 MG/DL (ref 65–105)
GLUCOSE BLD-MCNC: 140 MG/DL (ref 65–105)
GLUCOSE BLD-MCNC: 142 MG/DL (ref 65–105)
GLUCOSE SERPL-MCNC: 136 MG/DL (ref 70–99)
HCT VFR BLD AUTO: 26.4 % (ref 36.3–47.1)
HGB BLD-MCNC: 8.5 G/DL (ref 11.9–15.1)
MAGNESIUM SERPL-MCNC: 1.2 MG/DL (ref 1.6–2.6)
MAGNESIUM SERPL-MCNC: 1.7 MG/DL (ref 1.6–2.6)
MAGNESIUM SERPL-MCNC: 2.1 MG/DL (ref 1.6–2.6)
MCH RBC QN AUTO: 26.2 PG (ref 25.2–33.5)
MCHC RBC AUTO-ENTMCNC: 32.2 G/DL (ref 28.4–34.8)
MCV RBC AUTO: 81.5 FL (ref 82.6–102.9)
NRBC AUTOMATED: 0 PER 100 WBC
PDW BLD-RTO: 14.8 % (ref 11.8–14.4)
PHOSPHATE SERPL-MCNC: 1.3 MG/DL (ref 2.6–4.5)
PLATELET # BLD AUTO: 365 K/UL (ref 138–453)
PMV BLD AUTO: 10.1 FL (ref 8.1–13.5)
POTASSIUM SERPL-SCNC: 2.3 MMOL/L (ref 3.7–5.3)
POTASSIUM SERPL-SCNC: 2.9 MMOL/L (ref 3.7–5.3)
POTASSIUM SERPL-SCNC: 2.9 MMOL/L (ref 3.7–5.3)
RBC # BLD: 3.24 M/UL (ref 3.95–5.11)
SODIUM SERPL-SCNC: 142 MMOL/L (ref 135–144)
WBC # BLD AUTO: 15.7 K/UL (ref 3.5–11.3)

## 2023-03-27 PROCEDURE — 2500000003 HC RX 250 WO HCPCS: Performed by: INTERNAL MEDICINE

## 2023-03-27 PROCEDURE — 36415 COLL VENOUS BLD VENIPUNCTURE: CPT

## 2023-03-27 PROCEDURE — 2580000003 HC RX 258: Performed by: INTERNAL MEDICINE

## 2023-03-27 PROCEDURE — 97110 THERAPEUTIC EXERCISES: CPT

## 2023-03-27 PROCEDURE — 6360000002 HC RX W HCPCS

## 2023-03-27 PROCEDURE — 97116 GAIT TRAINING THERAPY: CPT

## 2023-03-27 PROCEDURE — 6360000002 HC RX W HCPCS: Performed by: INTERNAL MEDICINE

## 2023-03-27 PROCEDURE — 80069 RENAL FUNCTION PANEL: CPT

## 2023-03-27 PROCEDURE — 99232 SBSQ HOSP IP/OBS MODERATE 35: CPT | Performed by: INTERNAL MEDICINE

## 2023-03-27 PROCEDURE — 86140 C-REACTIVE PROTEIN: CPT

## 2023-03-27 PROCEDURE — 2060000000 HC ICU INTERMEDIATE R&B

## 2023-03-27 PROCEDURE — 6370000000 HC RX 637 (ALT 250 FOR IP): Performed by: INTERNAL MEDICINE

## 2023-03-27 PROCEDURE — 87040 BLOOD CULTURE FOR BACTERIA: CPT

## 2023-03-27 PROCEDURE — 85027 COMPLETE CBC AUTOMATED: CPT

## 2023-03-27 PROCEDURE — 82947 ASSAY GLUCOSE BLOOD QUANT: CPT

## 2023-03-27 PROCEDURE — 84132 ASSAY OF SERUM POTASSIUM: CPT

## 2023-03-27 PROCEDURE — 6370000000 HC RX 637 (ALT 250 FOR IP): Performed by: NURSE PRACTITIONER

## 2023-03-27 PROCEDURE — 2580000003 HC RX 258: Performed by: NURSE PRACTITIONER

## 2023-03-27 PROCEDURE — 83735 ASSAY OF MAGNESIUM: CPT

## 2023-03-27 PROCEDURE — 6360000002 HC RX W HCPCS: Performed by: STUDENT IN AN ORGANIZED HEALTH CARE EDUCATION/TRAINING PROGRAM

## 2023-03-27 RX ORDER — MAGNESIUM SULFATE HEPTAHYDRATE 40 MG/ML
4000 INJECTION, SOLUTION INTRAVENOUS ONCE
Status: COMPLETED | OUTPATIENT
Start: 2023-03-27 | End: 2023-03-27

## 2023-03-27 RX ORDER — OXYCODONE HYDROCHLORIDE AND ACETAMINOPHEN 5; 325 MG/1; MG/1
1 TABLET ORAL EVERY 4 HOURS PRN
Status: DISCONTINUED | OUTPATIENT
Start: 2023-03-27 | End: 2023-03-28 | Stop reason: HOSPADM

## 2023-03-27 RX ORDER — POTASSIUM CHLORIDE 20 MEQ/1
40 TABLET, EXTENDED RELEASE ORAL 2 TIMES DAILY WITH MEALS
Status: DISCONTINUED | OUTPATIENT
Start: 2023-03-27 | End: 2023-03-28 | Stop reason: HOSPADM

## 2023-03-27 RX ADMIN — SODIUM CHLORIDE, PRESERVATIVE FREE 10 ML: 5 INJECTION INTRAVENOUS at 21:34

## 2023-03-27 RX ADMIN — ALOGLIPTIN 25 MG: 12.5 TABLET, FILM COATED ORAL at 07:48

## 2023-03-27 RX ADMIN — POTASSIUM CHLORIDE 40 MEQ: 1500 TABLET, EXTENDED RELEASE ORAL at 09:22

## 2023-03-27 RX ADMIN — CEFTRIAXONE SODIUM 2000 MG: 10 INJECTION, POWDER, FOR SOLUTION INTRAVENOUS at 14:17

## 2023-03-27 RX ADMIN — HEPARIN SODIUM 5000 UNITS: 5000 INJECTION INTRAVENOUS; SUBCUTANEOUS at 14:18

## 2023-03-27 RX ADMIN — MAGNESIUM SULFATE HEPTAHYDRATE 4000 MG: 40 INJECTION, SOLUTION INTRAVENOUS at 09:25

## 2023-03-27 RX ADMIN — PROPRANOLOL HYDROCHLORIDE 10 MG: 10 TABLET ORAL at 07:48

## 2023-03-27 RX ADMIN — HEPARIN SODIUM 5000 UNITS: 5000 INJECTION INTRAVENOUS; SUBCUTANEOUS at 21:33

## 2023-03-27 RX ADMIN — POTASSIUM CHLORIDE 40 MEQ: 1500 TABLET, EXTENDED RELEASE ORAL at 17:14

## 2023-03-27 RX ADMIN — ZOLPIDEM TARTRATE 5 MG: 5 TABLET ORAL at 22:29

## 2023-03-27 RX ADMIN — ALPRAZOLAM 0.5 MG: 0.5 TABLET ORAL at 05:07

## 2023-03-27 RX ADMIN — POTASSIUM PHOSPHATE, MONOBASIC AND POTASSIUM PHOSPHATE, DIBASIC 30 MMOL: 224; 236 INJECTION, SOLUTION, CONCENTRATE INTRAVENOUS at 08:55

## 2023-03-27 RX ADMIN — TAMSULOSIN HYDROCHLORIDE 0.4 MG: 0.4 CAPSULE ORAL at 07:48

## 2023-03-27 RX ADMIN — HEPARIN SODIUM 5000 UNITS: 5000 INJECTION INTRAVENOUS; SUBCUTANEOUS at 06:06

## 2023-03-27 RX ADMIN — PROPRANOLOL HYDROCHLORIDE 10 MG: 10 TABLET ORAL at 21:34

## 2023-03-27 RX ADMIN — POTASSIUM CHLORIDE 10 MEQ: 7.46 INJECTION, SOLUTION INTRAVENOUS at 23:15

## 2023-03-27 RX ADMIN — QUETIAPINE FUMARATE 200 MG: 200 TABLET ORAL at 21:33

## 2023-03-27 RX ADMIN — INSULIN GLARGINE 15 UNITS: 100 INJECTION, SOLUTION SUBCUTANEOUS at 07:49

## 2023-03-27 RX ADMIN — OXYCODONE HYDROCHLORIDE AND ACETAMINOPHEN 1 TABLET: 5; 325 TABLET ORAL at 14:17

## 2023-03-27 RX ADMIN — OXYCODONE HYDROCHLORIDE AND ACETAMINOPHEN 1 TABLET: 5; 325 TABLET ORAL at 19:38

## 2023-03-27 RX ADMIN — ALPRAZOLAM 0.5 MG: 0.5 TABLET ORAL at 11:53

## 2023-03-27 RX ADMIN — ALPRAZOLAM 0.5 MG: 0.5 TABLET ORAL at 21:33

## 2023-03-27 RX ADMIN — SODIUM CHLORIDE, PRESERVATIVE FREE 10 ML: 5 INJECTION INTRAVENOUS at 07:53

## 2023-03-27 ASSESSMENT — PAIN DESCRIPTION - DESCRIPTORS
DESCRIPTORS: ACHING;DISCOMFORT
DESCRIPTORS: ACHING

## 2023-03-27 ASSESSMENT — PAIN DESCRIPTION - ORIENTATION
ORIENTATION: LOWER
ORIENTATION: LOWER

## 2023-03-27 ASSESSMENT — PAIN DESCRIPTION - LOCATION
LOCATION: BACK
LOCATION: BACK

## 2023-03-27 ASSESSMENT — PAIN - FUNCTIONAL ASSESSMENT: PAIN_FUNCTIONAL_ASSESSMENT: ACTIVITIES ARE NOT PREVENTED

## 2023-03-27 ASSESSMENT — PAIN SCALES - GENERAL
PAINLEVEL_OUTOF10: 6
PAINLEVEL_OUTOF10: 7

## 2023-03-28 VITALS
BODY MASS INDEX: 27.14 KG/M2 | HEIGHT: 62 IN | SYSTOLIC BLOOD PRESSURE: 139 MMHG | WEIGHT: 147.49 LBS | HEART RATE: 108 BPM | DIASTOLIC BLOOD PRESSURE: 92 MMHG | TEMPERATURE: 99 F | OXYGEN SATURATION: 97 % | RESPIRATION RATE: 19 BRPM

## 2023-03-28 PROBLEM — E87.6 HYPOKALEMIA: Status: ACTIVE | Noted: 2023-03-28

## 2023-03-28 LAB
ALBUMIN SERPL-MCNC: 2.1 G/DL (ref 3.5–5.2)
ANION GAP SERPL CALCULATED.3IONS-SCNC: 12 MMOL/L (ref 9–17)
BUN SERPL-MCNC: 4 MG/DL (ref 6–20)
CALCIUM SERPL-MCNC: 7.3 MG/DL (ref 8.6–10.4)
CHLORIDE SERPL-SCNC: 105 MMOL/L (ref 98–107)
CO2 SERPL-SCNC: 23 MMOL/L (ref 20–31)
CREAT SERPL-MCNC: 0.48 MG/DL (ref 0.5–0.9)
GFR SERPL CREATININE-BSD FRML MDRD: >60 ML/MIN/1.73M2
GLUCOSE BLD-MCNC: 124 MG/DL (ref 65–105)
GLUCOSE BLD-MCNC: 171 MG/DL (ref 65–105)
GLUCOSE BLD-MCNC: 180 MG/DL (ref 65–105)
GLUCOSE SERPL-MCNC: 144 MG/DL (ref 70–99)
HCT VFR BLD AUTO: 23.1 % (ref 36.3–47.1)
HGB BLD-MCNC: 7.5 G/DL (ref 11.9–15.1)
MAGNESIUM SERPL-MCNC: 1.5 MG/DL (ref 1.6–2.6)
MCH RBC QN AUTO: 26.5 PG (ref 25.2–33.5)
MCHC RBC AUTO-ENTMCNC: 32.5 G/DL (ref 28.4–34.8)
MCV RBC AUTO: 81.6 FL (ref 82.6–102.9)
NRBC AUTOMATED: 0 PER 100 WBC
PDW BLD-RTO: 15 % (ref 11.8–14.4)
PHOSPHATE SERPL-MCNC: 2.3 MG/DL (ref 2.6–4.5)
PLATELET # BLD AUTO: 381 K/UL (ref 138–453)
PMV BLD AUTO: 10.4 FL (ref 8.1–13.5)
POTASSIUM SERPL-SCNC: 3.1 MMOL/L (ref 3.7–5.3)
POTASSIUM SERPL-SCNC: 3.6 MMOL/L (ref 3.7–5.3)
RBC # BLD: 2.83 M/UL (ref 3.95–5.11)
SODIUM SERPL-SCNC: 140 MMOL/L (ref 135–144)
WBC # BLD AUTO: 15 K/UL (ref 3.5–11.3)

## 2023-03-28 PROCEDURE — 83735 ASSAY OF MAGNESIUM: CPT

## 2023-03-28 PROCEDURE — 2580000003 HC RX 258: Performed by: NURSE PRACTITIONER

## 2023-03-28 PROCEDURE — 99232 SBSQ HOSP IP/OBS MODERATE 35: CPT | Performed by: INTERNAL MEDICINE

## 2023-03-28 PROCEDURE — 6360000002 HC RX W HCPCS: Performed by: STUDENT IN AN ORGANIZED HEALTH CARE EDUCATION/TRAINING PROGRAM

## 2023-03-28 PROCEDURE — 6370000000 HC RX 637 (ALT 250 FOR IP): Performed by: INTERNAL MEDICINE

## 2023-03-28 PROCEDURE — 84132 ASSAY OF SERUM POTASSIUM: CPT

## 2023-03-28 PROCEDURE — 6370000000 HC RX 637 (ALT 250 FOR IP): Performed by: STUDENT IN AN ORGANIZED HEALTH CARE EDUCATION/TRAINING PROGRAM

## 2023-03-28 PROCEDURE — G0108 DIAB MANAGE TRN  PER INDIV: HCPCS

## 2023-03-28 PROCEDURE — 82306 VITAMIN D 25 HYDROXY: CPT

## 2023-03-28 PROCEDURE — 36415 COLL VENOUS BLD VENIPUNCTURE: CPT

## 2023-03-28 PROCEDURE — 85027 COMPLETE CBC AUTOMATED: CPT

## 2023-03-28 PROCEDURE — 6370000000 HC RX 637 (ALT 250 FOR IP): Performed by: NURSE PRACTITIONER

## 2023-03-28 PROCEDURE — 80069 RENAL FUNCTION PANEL: CPT

## 2023-03-28 PROCEDURE — 99239 HOSP IP/OBS DSCHRG MGMT >30: CPT | Performed by: STUDENT IN AN ORGANIZED HEALTH CARE EDUCATION/TRAINING PROGRAM

## 2023-03-28 PROCEDURE — 6360000002 HC RX W HCPCS

## 2023-03-28 PROCEDURE — 82947 ASSAY GLUCOSE BLOOD QUANT: CPT

## 2023-03-28 RX ORDER — LEVOFLOXACIN 500 MG/1
500 TABLET, FILM COATED ORAL DAILY
Status: DISCONTINUED | OUTPATIENT
Start: 2023-03-28 | End: 2023-03-28 | Stop reason: HOSPADM

## 2023-03-28 RX ORDER — POTASSIUM CHLORIDE 20 MEQ/1
40 TABLET, EXTENDED RELEASE ORAL ONCE
Status: COMPLETED | OUTPATIENT
Start: 2023-03-28 | End: 2023-03-28

## 2023-03-28 RX ORDER — MAGNESIUM SULFATE IN WATER 40 MG/ML
2000 INJECTION, SOLUTION INTRAVENOUS ONCE
Status: COMPLETED | OUTPATIENT
Start: 2023-03-28 | End: 2023-03-28

## 2023-03-28 RX ORDER — LEVOFLOXACIN 500 MG/1
500 TABLET, FILM COATED ORAL DAILY
Qty: 10 TABLET | Refills: 0 | Status: SHIPPED | OUTPATIENT
Start: 2023-03-29 | End: 2023-04-08

## 2023-03-28 RX ORDER — BLOOD-GLUCOSE METER
KIT MISCELLANEOUS
Qty: 1 KIT | Refills: 0 | Status: SHIPPED | OUTPATIENT
Start: 2023-03-28

## 2023-03-28 RX ORDER — ALPRAZOLAM 0.5 MG/1
0.5 TABLET ORAL 2 TIMES DAILY PRN
Qty: 20 TABLET | Refills: 0 | Status: SHIPPED | OUTPATIENT
Start: 2023-03-28 | End: 2023-04-17

## 2023-03-28 RX ORDER — POTASSIUM CHLORIDE 20 MEQ/1
20 TABLET, EXTENDED RELEASE ORAL DAILY
Qty: 60 TABLET | Refills: 3 | Status: SHIPPED | OUTPATIENT
Start: 2023-03-28 | End: 2023-03-28 | Stop reason: SDUPTHER

## 2023-03-28 RX ORDER — LISINOPRIL 20 MG/1
20 TABLET ORAL DAILY
Qty: 30 TABLET | Refills: 3 | Status: SHIPPED | OUTPATIENT
Start: 2023-03-28

## 2023-03-28 RX ORDER — POTASSIUM CHLORIDE 20 MEQ/1
20 TABLET, EXTENDED RELEASE ORAL 2 TIMES DAILY
Qty: 60 TABLET | Refills: 3 | Status: SHIPPED | OUTPATIENT
Start: 2023-03-28

## 2023-03-28 RX ORDER — MELATONIN
1000 DAILY
Qty: 30 TABLET | Refills: 0 | Status: SHIPPED | OUTPATIENT
Start: 2023-03-28

## 2023-03-28 RX ORDER — GLIPIZIDE 5 MG/1
5 TABLET ORAL 2 TIMES DAILY
Qty: 60 TABLET | Refills: 3 | Status: SHIPPED | OUTPATIENT
Start: 2023-03-28

## 2023-03-28 RX ADMIN — POTASSIUM CHLORIDE 10 MEQ: 7.46 INJECTION, SOLUTION INTRAVENOUS at 05:47

## 2023-03-28 RX ADMIN — OXYCODONE HYDROCHLORIDE AND ACETAMINOPHEN 1 TABLET: 5; 325 TABLET ORAL at 01:38

## 2023-03-28 RX ADMIN — SODIUM CHLORIDE 25 ML: 9 INJECTION, SOLUTION INTRAVENOUS at 11:44

## 2023-03-28 RX ADMIN — OXYCODONE HYDROCHLORIDE AND ACETAMINOPHEN 1 TABLET: 5; 325 TABLET ORAL at 11:38

## 2023-03-28 RX ADMIN — LEVOFLOXACIN 500 MG: 500 TABLET, FILM COATED ORAL at 12:30

## 2023-03-28 RX ADMIN — MAGNESIUM SULFATE HEPTAHYDRATE 2000 MG: 40 INJECTION, SOLUTION INTRAVENOUS at 11:45

## 2023-03-28 RX ADMIN — ALOGLIPTIN 25 MG: 12.5 TABLET, FILM COATED ORAL at 09:23

## 2023-03-28 RX ADMIN — POTASSIUM CHLORIDE 40 MEQ: 1500 TABLET, EXTENDED RELEASE ORAL at 17:07

## 2023-03-28 RX ADMIN — POTASSIUM CHLORIDE 10 MEQ: 7.46 INJECTION, SOLUTION INTRAVENOUS at 04:23

## 2023-03-28 RX ADMIN — POTASSIUM CHLORIDE 40 MEQ: 1500 TABLET, EXTENDED RELEASE ORAL at 11:38

## 2023-03-28 RX ADMIN — TAMSULOSIN HYDROCHLORIDE 0.4 MG: 0.4 CAPSULE ORAL at 09:25

## 2023-03-28 RX ADMIN — POTASSIUM CHLORIDE 40 MEQ: 1500 TABLET, EXTENDED RELEASE ORAL at 11:39

## 2023-03-28 RX ADMIN — POTASSIUM CHLORIDE 10 MEQ: 7.46 INJECTION, SOLUTION INTRAVENOUS at 00:36

## 2023-03-28 RX ADMIN — POTASSIUM CHLORIDE 10 MEQ: 7.46 INJECTION, SOLUTION INTRAVENOUS at 03:22

## 2023-03-28 RX ADMIN — OXYCODONE HYDROCHLORIDE AND ACETAMINOPHEN 1 TABLET: 5; 325 TABLET ORAL at 05:51

## 2023-03-28 RX ADMIN — INSULIN GLARGINE 15 UNITS: 100 INJECTION, SOLUTION SUBCUTANEOUS at 10:30

## 2023-03-28 RX ADMIN — POTASSIUM CHLORIDE 10 MEQ: 7.46 INJECTION, SOLUTION INTRAVENOUS at 01:36

## 2023-03-28 RX ADMIN — SODIUM CHLORIDE, PRESERVATIVE FREE 10 ML: 5 INJECTION INTRAVENOUS at 09:25

## 2023-03-28 RX ADMIN — HEPARIN SODIUM 5000 UNITS: 5000 INJECTION INTRAVENOUS; SUBCUTANEOUS at 05:53

## 2023-03-28 RX ADMIN — PROPRANOLOL HYDROCHLORIDE 10 MG: 10 TABLET ORAL at 09:25

## 2023-03-28 ASSESSMENT — PAIN DESCRIPTION - ORIENTATION
ORIENTATION: LOWER

## 2023-03-28 ASSESSMENT — PAIN SCALES - GENERAL
PAINLEVEL_OUTOF10: 6

## 2023-03-28 ASSESSMENT — PAIN DESCRIPTION - LOCATION
LOCATION: BACK

## 2023-03-28 ASSESSMENT — PAIN - FUNCTIONAL ASSESSMENT
PAIN_FUNCTIONAL_ASSESSMENT: ACTIVITIES ARE NOT PREVENTED
PAIN_FUNCTIONAL_ASSESSMENT: ACTIVITIES ARE NOT PREVENTED

## 2023-03-28 ASSESSMENT — PAIN DESCRIPTION - DESCRIPTORS
DESCRIPTORS: ACHING;DISCOMFORT
DESCRIPTORS: ACHING;DISCOMFORT;CRAMPING

## 2023-03-28 NOTE — PROGRESS NOTES
[facility])  Occupational Therapy Not Seen Note    DATE: 3/24/2023    NAME: Gama Rider  MRN: [id number]   : 1965      Patient not seen this date for Occupational Therapy due to:    Patient is not appropriate for active participation in OT evaluation/treatment at this time d/t evaluation for possible transfer to ICU     Next Scheduled Treatment: Ck 3/25    Electronically signed by Satinder Pike OT on 3/24/2023 at 11:56 AM
[facility] Neurology   IN-PATIENT SERVICE      NEUROLOGY PROGRESS  NOTE            Date:   3/25/2023  Patient name:  Emma Zapata  Date of admission:  3/23/2023  YOB: 1965      Interval History:     Since yesterday patient has improved markedly. She is awake alert and able to follow commands and answer questions appropriately. She states that she is experiencing some generalized pain. She does have a history of low back pain. The right eye squint has resolved. She has track of time such as day and thought it was April although its the end of March. This is understandable considering her status of altered mentation. She is making excellent recovery now and is easily oriented. History of Present Illness: The patient is a 61 y.o. female who presents with Hyperglycemia (Was sent by HCP)  . The patient was seen and examined and the chart was reviewed. Neurology was consulted to evaluate this patient with lethargy and acute encephalopathy. Patient is a recently diagnosed type II diabetic who was found to have glucoses greater than 700, hyperosmolar state, Candelario anemia, lactic acidosis. She does take metformin. Patient also takes Xanax, Neurontin, Inderal and amlodipine. While here straight catheter demonstrated patient had more than a liter urinary retention. Cultures are positive for gram-negative rods suspicion being Klebsiella. Patient white count this morning was 34.1 with a left shift. H&H was 9.7 30.1 and platelets were 248,682     This patient is considered to be at risk for CVST (cerebral venous sinus thrombosis) however at the moment there is no imaging or clinical findings consistent with that symptom complex.     Past Medical History:     Past Medical History:   Diagnosis Date    Anxiety     Chronic back pain     Hypertension     Mitral valve prolapse         Past Surgical History:     Past Surgical History:   Procedure Laterality Date     SECTION
Neurology follow-up note    3/26/2023      Chief complaint sepsis with Klebsiella pneumonia and urinary tract infection    HPI    Patient was admitted on 3/23/2023 with lethargy and confusion    Neuro exam    Blood pressure 122/75, pulse (!) 114, temperature (!) 100.9 °F (38.3 °C), temperature source Oral, resp. rate 22, height 5' 2\" (1.575 m), weight 143 lb 1.3 oz (64.9 kg), SpO2 98 %. Mental status: Awake and cooperative. Language shows normal reception expression repetition. Concentration response time appear appropriate. Patient appears to have normal mentation. CN II through XII: Pupils are equal and round and do react. Extraocular muscles intact. Motor and sensory function of face is intact. Swallowing phonation hearing and tongue all appear normal.    I noted on today's evaluation she seems to have slightly increased closure of the right eye compared to the left. I have seen this variable in this patient. Patient states that this is not a problem and I do not think it does represent a problem. Motor: No focal weakness. Patient is thinly built. She may be deconditioned overall. Cerebellar: No tremors or cogwheeling or ataxia    Reflexes: Trace in biceps and knees. Toes down. Sensory no sensory deficit noted. Station and gait I did not attempt to walk the patient at this time. Patient scheduled to have physical therapy for ambulation today. Impression  1. Lethargy and encephalopathy appear to have resolved. Recommendations  1. No further action per neurology. 2.  Neurology will sign off at this time.     Electronically signed by Kamla Johnson MD on 3/26/2023 at 11:03 AM
Occupational [address] [employer]  Occupational Therapy Not Seen Note    DATE: 3/27/2023    NAME: Mike Ceballos  MRN: [id number]   : 1965      Patient not seen this date for Occupational Therapy due to:    Patient Declined: Pt stated being fatigued wanting to sleep. Max encouragement to participate with poor return.     Next Scheduled Treatment: 3/28    Electronically signed by TERA Judge on 3/27/2023 at 11:30 AM
Physical Therapy        Physical Therapy Cancel Note      DATE: 3/24/2023    NAME: Robert Aldridge  MRN: [id number]   : 1965      Patient not seen this date for Physical Therapy due to:    Patient is not appropriate for PT evaluation/treatment at this time d/t possible transfer to ICU. Will pursue for mobility readiness.        Electronically signed by Geoffrey Chandler PT on 3/24/2023 at 1:34 PM
Pt a/o x2  no distress noted, denies pain.  Following commands, delayed responses Medina placed esau urine noted VSS
[facility]  Office: [address], DO, Rober Cornejodominik, DO, Miryam Patt, DO, Jamil Zafar Blood, DO, Marlee Huang MD, Simin Johnson MD, Grisel Manzo MD, Lobo Kearney MD,  Migue Pierre MD, Maria E Richardson MD, Brett Gonzales, DO, Nahid Hernandez MD,  Gadiel Rangel MD, Robyn Reich MD, Marcelo Chowdary, DO, Mary Norris MD, Kathryn Reina MD, William Lemus, DO, Daniel Carbajal MD, Jun Thurston MD, Jose Francisco Anderson MD, Daysi Butterfield MD, Casey Castillo MD, Ayush Gallardo DO, Inge Fried MD, Dima Solomon MD, Chadwick Bean, Ember Mcclellan, CNP, Myles Marie, CNP, Citlaly Gomez, CNP,  Lonnie Peralta, [facility], Tiffanie Max, CNP, Juan Gonzáles, CNP, Chadwick Ma, CNP, Celina Mckeon, CNP, Brit Holden, CNP, Magui Page PA-C, Prashanth Miller, CNS, George Cherry, CNP, Steve [place], [address] [place] [address]    Progress Note    3/27/2023    1:52 PM    Name:   Gama Rider  MRN:     [id number]     [place]:      [de-identified]   Room:   [address] [place] [address] Day:  4  Admit Date:  3/23/2023  3:22 PM    PCP:   Azael Wing MD  Code Status:  Full Code    Subjective:     C/C:   Chief Complaint   Patient presents with    Hyperglycemia     Was sent by HCP     Interval History Status: not changed. Patient seen and examined, no issues overnight. Still having back pain, still having persistent fevers overnight. Brief History:     59-year-old female presents for altered mental status tachycardia and lethargy.   Patient was initially transferred to the ICU for evaluation, patient had improvement of mentation and was discharged to general medicine floor for further care    Review of Systems:     Constitutional:  negative for chills, fevers, sweats  Respiratory:  negative for cough, dyspnea on exertion, shortness of breath, wheezing  Cardiovascular:  negative for chest pain, chest pressure/discomfort, lower
Vitals:    03/23/23 2330   BP: (!) 158/92   Pulse: (!) 142   Resp: 30   Temp: (!) 103.3 °F (39.6 °C)   SpO2: 95%        Pt arrived on unit lethargic, not answering questions at this time resident at bedside
3x/week  Current Treatment Recommendations: Balance training, Functional mobility training, Cognitive reorientation, Safety education & training, Patient/Caregiver education & training, Self-Care / ADL, Equipment evaluation, education, & procurement, Cognitive/Perceptual training     Restrictions  Restrictions/Precautions  Restrictions/Precautions: Fall Risk, General Precautions  Required Braces or Orthoses?: No    Subjective   General  Patient assessed for rehabilitation services?: Yes  Family / Caregiver Present: Yes (dora)  General Comment  Comments: RN okayed for therapy. pt agreeable and cooperative throughout. Pt denies pain     Social/Functional History  Social/Functional History  Lives With: Significant other (with bladimirsterling)  Type of Home: Apartment (7th floor)  Home Layout: One level  Home Access: Elevator  Bathroom Shower/Tub: Tub/Shower unit  Bathroom Toilet: Standard  Bathroom Equipment: Grab bars in shower, Grab bars around toilet  Home Equipment: Walker, rolling (does not use at baseline)  Has the patient had two or more falls in the past year or any fall with injury in the past year?: No  Receives Help From: Friend(s)  ADL Assistance: Independent  Homemaking Assistance: Independent (shares with dora)  Homemaking Responsibilities: Yes  Ambulation Assistance: Independent  Transfer Assistance: Independent  Active : No  Patient's  Info: friends  Occupation: On disability  Additional Comments: Dora reports 24/7 assist at discharge       Objective     Safety Devices  Type of Devices: Call light within reach;Nurse notified;Gait belt;Patient at risk for falls; Left in bed    Bed Mobility Training  Bed Mobility Training: Yes  Overall Level of Assistance: Stand-by assistance  Interventions: Verbal cues  Supine to Sit:  (sitting EOB with nursing aid at start of session)  Sit to Supine: Stand-by assistance  Scooting: Stand-by assistance    Balance  Sitting: Without support (EOB and toilet
support information:   __x_  Handout - What is diabetes? cornerstones4 care 2019  __x_  Handout - Eating Healthy for Life at every Meal / Plate method and grocery list  from www. [employer]  __x_ Handout - How to Check Your Blood Sugar from www. [facility]  __x_ Handout - Be safe with Needles teaching sheet - / www. [employer]    __x_ Handout - How to Use an Insulin Pen - handout with QR code - Health wise Current as of Sept 22 2021  __x_ Emergency Insert sheet for your Vehicle - ADA Diabetes Emergencies   __x_ Diabetes ID card - ADA Low and High Blood Sugar and treatments  __x_ [facility] Diabetes Education brochure/ contact card    Out patient diabetes education  contact number [email or web address] - [phone] to patient and placed on the discharge summary.       Jo Shi RN
03/25/23  1647 03/25/23  1929 03/26/23  0752   POCGLU 249* 202* 152* 176*         I/O (24Hr): Intake/Output Summary (Last 24 hours) at 3/26/2023 1219  Last data filed at 3/26/2023 0900  Gross per 24 hour   Intake 4101.92 ml   Output 3600 ml   Net 501.92 ml         Labs:  Hematology:  Recent Labs     03/23/23  1807 03/24/23  0149 03/24/23  0725   WBC 43.1*  --  34.1*   RBC 3.56*  --  3.63*   HGB 9.7*  --  9.7*   HCT 29.0*  --  30.1*   MCV 81.5*  --  82.9   MCH 27.2  --  26.7   MCHC 33.4  --  32.2   RDW 14.3  --  14.5*   *  --  385   MPV 10.7  --  10.7   INR  --  1.3  --        Chemistry:  Recent Labs     03/23/23  1606 03/23/23  1730 03/23/23  1807 03/23/23  1900 03/23/23  1911 03/23/23  2134 03/23/23  2255 03/24/23  0725   NA  --   --  124*  --   --   --   --  135   K  --   --  3.7  --   --   --   --  3.3*   CL  --   --  86*  --   --   --   --  99   CO2  --   --  18*  --   --   --   --  16*   GLUCOSE  --    < > 520*   < >  --  410 340 414*   BUN  --   --  31*  --   --   --   --  25*   CREATININE 0.89  --  1.05*  --   --   --   --  0.88   MG  --   --  1.9  --   --   --   --  1.7   ANIONGAP  --   --  20*  --   --   --   --  20*   LABGLOM  --   --  >60  --   --   --   --  >60   CALCIUM  --   --  8.9  --   --   --   --  8.2*   TROPHS  --   --  10  --  12  --   --   --    LACTACIDWB  --   --  2.7*  --   --   --   --   --     < > = values in this interval not displayed.        Recent Labs     03/23/23  1807 03/23/23  1853 03/23/23  2321 03/24/23  0102 03/24/23  0149 03/24/23  0653 03/24/23  0725 03/24/23  1117 03/24/23 2014 03/25/23  0818 03/25/23  1200 03/25/23  1647 03/25/23  1929 03/26/23  0752   PROT 7.4  --   --   --  6.7  --  6.4  --   --   --   --   --   --   --    LABALBU 2.7*  --   --   --  2.6*  --  2.4*  --   --   --   --   --   --   --    LABA1C  --   --  11.0*  --   --   --   --   --   --   --   --   --   --   --    TSH  --   --   --   --  0.17*  --   --   --   --   --   --   --   --   --    AST 14
Ankle pumps x20 BLE, LAQ x20 BLE, KTC x20 BLE. AM-PAC Score  AM-PAC Inpatient Mobility Raw Score : 17 (03/27/23 1458)  AM-PAC Inpatient T-Scale Score : 42.13 (03/27/23 1458)  Mobility Inpatient CMS 0-100% Score: 50.57 (03/27/23 1458)  Mobility Inpatient CMS G-Code Modifier : CK (03/27/23 1458)       Goals  Short Term Goals  Time Frame for Short Term Goals: 14 days  Short Term Goal 1: Pt to tolerate 20-30 mins ther ex/act for improved strength and endurance. Short Term Goal 2: Pt to demo good dynamic standing balance for decrease fall risk. Short Term Goal 3: Pt to perform all bed mob, transfers, and gait independently. Short Term Goal 4: Pt to ambulate 150ft without AD and no LOB.   Patient Goals   Patient Goals : home following discharge       Therapy Time   Individual Concurrent Group Co-treatment   Time In 1410         Time Out 1441         Minutes 31         Timed Code Treatment Minutes: [address] [place], [place]
Therapy  Education Method: Verbal  Education Outcome: Verbalized understanding      Therapy Time   Individual Concurrent Group Co-treatment   Time In 1148         Time Out 1215         Minutes 733 Iam Buck, PT, DPT, CMPT
Sensitive <=1 BACTERIAL SUSCEPTIBILITY PANEL TOM Final    levofloxacin Sensitive <=0.12 BACTERIAL SUSCEPTIBILITY PANEL TOM Final    nitrofurantoin Intermediate 64 BACTERIAL SUSCEPTIBILITY PANEL TOM Final    piperacillin-tazobactam Sensitive <=4 BACTERIAL SUSCEPTIBILITY PANEL TOM Final    tobramycin Sensitive <=1 BACTERIAL SUSCEPTIBILITY PANEL TOM Final    trimethoprim-sulfamethoxazole Sensitive <=20 BACTERIAL SUSCEPTIBILITY PANEL TOM Final          Specimen Collected: 03/23/23 16:53 EDT Last Resulted: 03/24/23 20:13 EDT        RAPID INFLUENZA A/B ANTIGENS [[id number]] Collected: 03/23/23 1802   Order Status: Completed Specimen: Nasopharyngeal Updated: 03/23/23 1819    Flu A Antigen NEGATIVE    Comment: for Influenza A Antigen       Flu B Antigen NEGATIVE    Comment: for Influenza B Antigen. COVID-19, Rapid [[id number]] Collected: 03/23/23 1645   Order Status: Completed Specimen: Nasopharyngeal Swab Updated: 03/23/23 1713    Specimen Description . NASOPHARYNGEAL SWAB    SARS-CoV-2, Rapid Not Detected    Comment:        Rapid NAAT:  The specimen is NEGATIVE for SARS-CoV-2, the novel coronavirus associated with   COVID-19. The ID NOW COVID-19 assay is designed to detect the virus that causes COVID-19 in patients   with signs and symptoms of infection who are suspected of COVID-19. An individual without symptoms of COVID-19 and who is not shedding SARS-CoV-2 virus would   expect to have a negative (not detected) result in this assay. Negative results should be treated as presumptive and, if inconsistent with clinical signs   and symptoms or necessary for patient management,   should be tested with an alternative molecular assay. Negative results do not preclude   SARS-CoV-2 infection and   should not be used as the sole basis for patient management decisions.          Fact sheet for Healthcare Providers: http://www.pamela.tobias/   Fact sheet for Patients:
hyperglycemia ([address] 3/24/2023 Yes    Acute anemia 3/24/2023 Yes    Bandemia 3/24/2023 Yes    High anion gap metabolic acidosis 3/93/3640 Yes    Hypocalcemia 3/24/2023 Yes    Lactic acid acidosis 3/24/2023 Yes    Acute pyelonephritis 3/24/2023 Yes    Septicemia due to Klebsiella pneumoniae ([facility] 3/24/2023 Yes    Hyperglycemia 3/24/2023 Yes    Hyponatremia 3/24/2023 Yes    Lactic acidosis 3/24/2023 Yes       Plan:        UTI with metabolic encephalopathy-mentation improving closer to baseline, antibiotic switched to levaquin per ID, leukocytosis downtrending. Type II diabetes mellitus-on insulin in the hospital, doubtful that patient will be able to continue insulin at home, will switch to glipizide, continue metformin, patient needs to establish care with PCP    Hypertension- resume lisinopril    Urinary retention- intermittent straight cath 3 times  a day    Multiple electrolytes replaced  Repeat bmp outpatient  Wean xanax    Patient not followed up with PCP in many years, not able to establish home care as patient needs PCP. Patient has high risk of readmission due to poor insight. Patient states that she has family to help.     Diana Avalos MD  3/28/2023  5:15 PM
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   Takes Xanax 2 mg 3 times a day, will restart 0.5 mg 3 times a day  Restarted Seroquel at previous dose  Continue sepsis treatment    Aimee Landa DO  3/25/2023  10:56 AM
management,   should be tested with an alternative molecular assay. Negative results do not preclude   SARS-CoV-2 infection and   should not be used as the sole basis for patient management decisions. Fact sheet for Healthcare Providers: http://www.pamela.tobias/   Fact sheet for Patients: http://www.pamela.tobias/         Methodology: Isothermal Nucleic Acid Amplification           Medications:      QUEtiapine  200 mg Oral QPM    alogliptin  25 mg Oral Daily    insulin glargine  15 Units SubCUTAneous Daily    heparin (porcine)  5,000 Units SubCUTAneous 3 times per day    insulin lispro  0-16 Units SubCUTAneous TID WC    insulin lispro  0-4 Units SubCUTAneous Nightly    cefTRIAXone (ROCEPHIN) IV  2,000 mg IntraVENous Q24H    tamsulosin  0.4 mg Oral Daily    propranolol  10 mg Oral BID    sodium chloride flush  5-40 mL IntraVENous 2 times per day    [Held by provider] amLODIPine  10 mg Oral Daily    And    [Held by provider] lisinopril  20 mg Oral Daily       Electronically signed by Rohit Carrillo MD on 3/25/2023 at 5:56 PM      [facility]  Rohit Carrillo MD  Perfect [employer] messaging  OFFICE: [phone]    Thank you for allowing us to participate in the care of this patient. Please call with questions. This note is created with the assistance of a speech recognition program.  While intending to generate a document that actually reflects the content of the visit, the document can still have some errors including those of syntax and sound a like substitutions which may escape proof reading. In such instances, actual meaning can be extrapolated by contextual diversion.
of retroperitoneal lymphadenopathy. Bones/Soft Tissues: No acute abnormality. Impression   1. Bilateral hydronephrosis and hydroureter; however, no evidence of ureteric   stones. 2. Changes of left acute pyelonephritis with renal inhomogeneity and   inflammatory changes but no renal abscess at this time. The right kidney is   hydronephrotic but no evidence of inflammatory changes or heterogeneity. 3. Distention of the urinary bladder, no focal thickening. 4. No acute gastrointestinal abnormality. RECOMMENDATIONS:   Medina catheter to decompress the urinary bladder. Antibiotic treatment of pyelonephritis. Narrative   EXAMINATION:   ONE XRAY VIEW OF THE CHEST       3/23/2023 4:33 pm       COMPARISON:   None. HISTORY:   ORDERING SYSTEM PROVIDED HISTORY: Dehydration, hyperglycemia, rule out   infiltrate   TECHNOLOGIST PROVIDED HISTORY:   Dehydration, hyperglycemia, rule out infiltrate       FINDINGS:   Lung volumes are small. Opacities in the medial right lung base are believed   represent normal overlapping lung markings. There is no confluent   consolidation or effusion. The cardiomediastinal silhouette and pulmonary   vessels appear normal.           Impression   Hypoventilatory chest with no acute findings. Medical Decision [id number]:     Component 3/23/23 1630   Specimen Description . BLOOD    Special Requests R AC 1.5ML    Culture POSITIVE Blood Culture Abnormal     Culture DIRECT GRAM STAIN FROM BOTTLE: GRAM NEGATIVE RODS    Culture Klebsiella pneumoniae Detected: Methodology- Polymerase Chain Reaction (PCR)    Culture KLEBSIELLA PNEUMONIAE Abnormal     Culture (NOTE) Direct Gram Stain from bottle and Polymerase Chain Reaction (PCR) results called to and read back by: CYNTHIA GEE AT 4370 ON 3/24/23   Resulting Agency [employer]  [employer]    Klebsiella pneumoniae (4)    Antibiotic Interpretation Microscan Method
findings with the residents and students. I have seen and examined the patient and the key elements of the encounter have been performed by me. I was present when the student obtained his information or examined the patient. I have reviewed the laboratory data, other diagnostic studies and discussed them with the residents. I have updated the medical record where necessary. I agree with the assessment, plan and orders as documented by the resident/ student.     Frank Mota MD.    Pager: [phone] - Office: [phone]

## 2023-03-28 NOTE — DISCHARGE SUMMARY
Condition:  poor     Discharged Condition: fair    Hospital Stay:     Hospital Course:  Adelaide Morley is a 61 y.o. female who was admitted for the management of   Sepsis [facility]) , presented to ER with Hyperglycemia (Was sent by HCP)    59-year-old female with past medical history of retention, diabetes mellitus, mitral valve prolapse presents to the hospital with her fiancé for increasing lethargy, decreased activity and poor feeding. Patient was recently seen urology for retention incomplete bladder emptying and decision was made to take patient to ER. Patient was nonverbal and confused on arrival, blood sugar was elevated 600. Patient was noted to have gram-negative blood cultures positive, ID was consulted, patient was seen to have Klebsiella pneumonia septicemia with UTI, left-sided pyelonephritis patient was on Rocephin, switched to Levaquin by infectious disease 12/7. Significant therapeutic interventions:     UTI with metabolic encephalopathy-mentation improving closer to baseline, antibiotic switched to levaquin per ID, leukocytosis downtrending. Type II diabetes mellitus-on insulin in the hospital, doubtful that patient will be able to continue insulin at home, will switch to glipizide, continue metformin, patient needs to establish care with PCP     Hypertension- resume lisinopril     Urinary retention- intermittent straight cath 3 times  a day     Multiple electrolytes replaced  Repeat bmp outpatient  Wean xanax     Patient not followed up with PCP in many years, not able to establish home care as patient needs PCP. Patient has high risk of readmission due to poor insight. Patient states that she has family to help.        Significant Diagnostic Studies:   Labs / Micro:  CBC:   Lab Results   Component Value Date/Time    WBC 15.0 03/28/2023 10:17 AM    RBC 2.83 03/28/2023 10:17 AM    RBC 4.01 12/12/2011 03:19 AM    HGB 7.5 03/28/2023 10:17 AM    HCT 23.1 03/28/2023 10:17 AM    MCV 81.6

## 2023-03-28 NOTE — DISCHARGE INSTRUCTIONS
Take metformin and glipizide daily  Take potassium supplementation  Take levaquin for 10 days till 4/7  Check blood sugars atleas 2 times a day  Stop amlodipine and benazepril, start lisinopril 20  Take daily potassium supplements  Bmp recheck in 2-3 days

## 2023-03-28 NOTE — PLAN OF CARE
Critical care was asked to evaluate patient this morning. Assessment, patient is awake, opens her eyes spontaneously, follows commands, answers questions. She is a GCS of 15, intermittently confused. Labs and vitals were revieed this morning. Her heart rate has improved to the 100s, she is not required any pressor support, pressures in the 988K to 567N systolic. She is afebrile her white count did improve from 43,000 to 34,000. Her CT scan did show bilateral hydronephrosis, on physical exam she did have bilateral CVA tenderness. Urology on board. No abdominal pain. We did assess her  area, no concern for erythema, warmth to be concerning for Hillary gangrene although patient does have elevated sugar with elevation her white count. Our impression is urosepsis. Patient is already on cefepime and vancomycin. Blood cultures is positive. Consult for infectious disease was placed. We did discuss with Dr. Veronique Levy ICU attending, and Dr. Moo Stahl who is primary, nurse and charge nurse that at this moment she is not ICU appropriate. However if things were to escalate to where she is requiring pressure support, we will be available.       Electronically signed by Mauri Tinoco MD on 3/24/2023 at 9:07 AM
Problem: Discharge Planning  Goal: Discharge to home or other facility with appropriate resources  Outcome: Progressing
Problem: Discharge Planning  Goal: Discharge to home or other facility with appropriate resources  Outcome: Progressing     Problem: Pain  Goal: Verbalizes/displays adequate comfort level or baseline comfort level  Outcome: Progressing     Problem: Safety - Adult  Goal: Free from fall injury  Outcome: Progressing     Problem: Respiratory - Adult  Goal: Achieves optimal ventilation and oxygenation  Outcome: Progressing     Problem: Genitourinary - Adult  Goal: Absence of urinary retention  Outcome: Progressing  Goal: Urinary catheter remains patent  Outcome: Progressing     Problem: Infection - Adult  Goal: Absence of fever/infection during anticipated neutropenic period  Outcome: Progressing       -will continue to monitor the patient
Problem: Pain  Goal: Verbalizes/displays adequate comfort level or baseline comfort level  Outcome: Not Progressing     Problem: Pain  Goal: Verbalizes/displays adequate comfort level or baseline comfort level  Outcome: Not Progressing
Problem: Safety - Adult  Goal: Free from fall injury  Outcome: Progressing   Safety precaution in place, no safety event to report
anticipated neutropenic period  3/25/2023 1204 by Charles Childs RN  Outcome: Progressing  3/25/2023 0403 by Reid Rodriguez RN  Outcome: Progressing

## 2023-03-28 NOTE — CARE COORDINATION
Discharge 1 [facility] Case Management Department  Written by: Facundo Cummings RN    Patient Name: Dequan Morelos  Attending Provider: Durga Singh MD  Admit Date: 3/23/2023  3:22 PM  MRN: [id number]  Account: [de-identified]                     : 1965  Discharge Date: 3-      Disposition: home    Facundo Cummings RN
Met with patient to complete initial assessment.  She requested for CM to come back tomorrow because she's trying to sleep
Transitional planning- call from Jose at [facility] not decided yet if they are going to accept. Trying to see if PCP will follow. Patient will need [facility] cab when discharged.  From previous CM note
Transitional planning-patient hasn't established with Tamera Lilly yet. Cannot have home care at this time. Needs ride home    1750 set up ride with Vivi Ross will be picking her up in 9 minutes [facility] April confirmation # [phone].
Potential DME:    Patient expects to discharge to: (P) [facility] for transportation at discharge: (P) Cab    Financial    Payor: Yessenia Goff / Plan: Radha Leaf / Product Type: *No Product type* /     Does insurance require precert for SNF: Yes    Potential assistance Purchasing Medications:    Meds-to-Beds request: Yes      [address]  [address] [address]  Phone: [phone] Fax: [phone]      Notes:    Factors facilitating achievement of predicted outcomes: Family support, Friend support, and Cooperative    Barriers to discharge: Medical complications    Additional Case Management Notes: patient's goal is to return home. On previous admission, she was arranged with [facility] for home care. She relates that they never started care. Referral sent. Patient requesting glucometer. She most likely will need cab transportation home    The Plan for Transition of Care is related to the following treatment goals of Sepsis ([address] [H67.2]    IF APPLICABLE: The Patient and/or patient representative Michelle Crockett and her family were provided with a choice of provider and agrees with the discharge plan. Freedom of choice list with basic dialogue that supports the patient's individualized plan of care/goals and shares the quality data associated with the providers was provided to: (P) Patient   Patient Representative Name:       The Patient and/or Patient Representative Agree with the Discharge Plan? (P) Yes    Aditya Mcintyre RN  Case Management Department  Ph: [phone] Fax: [phone] notified by Melvin Mclaughlin from [facility] that they are able to accept as long as patient is current with PCP.  They will call tomorrow to verify

## 2023-03-29 NOTE — BH NOTE
CLINICAL PHARMACY NOTE: MEDS TO BEDS    Total # of Prescriptions Filled: 6   The following medications were delivered to the patient:  Lisinopril 20 mg tablets  Metformin HCl 1000 mg Tabs  Alprazolam 0.5 mg Tabs  Glipizide 5 mg Tabs  Levofloxacin 500 mg Tabs  Potassium Chloride ER 20 Meq    Additional Documentation:

## 2023-03-31 LAB — 25(OH)D3 SERPL-MCNC: <6 NG/ML

## 2023-04-01 LAB
FOLATE SERPL-MCNC: 4.2 NG/ML
MICROORGANISM SPEC CULT: NORMAL
MICROORGANISM SPEC CULT: NORMAL
SERVICE CMNT-IMP: NORMAL
SERVICE CMNT-IMP: NORMAL
SPECIMEN DESCRIPTION: NORMAL
SPECIMEN DESCRIPTION: NORMAL
VIT B12 SERPL-MCNC: 602 PG/ML (ref 232–1245)

## 2023-04-10 ENCOUNTER — HOSPITAL ENCOUNTER (INPATIENT)
Age: 58
LOS: 2 days | Discharge: HOME HEALTH | DRG: 315 | End: 2023-04-12
Attending: EMERGENCY MEDICINE | Admitting: INTERNAL MEDICINE
Payer: COMMERCIAL

## 2023-04-10 ENCOUNTER — APPOINTMENT (OUTPATIENT)
Dept: ULTRASOUND IMAGING | Age: 58
DRG: 315 | End: 2023-04-10
Payer: COMMERCIAL

## 2023-04-10 ENCOUNTER — APPOINTMENT (OUTPATIENT)
Dept: GENERAL RADIOLOGY | Age: 58
DRG: 315 | End: 2023-04-10
Payer: COMMERCIAL

## 2023-04-10 DIAGNOSIS — R65.10 SIRS (SYSTEMIC INFLAMMATORY RESPONSE SYNDROME) (HCC): ICD-10-CM

## 2023-04-10 DIAGNOSIS — N17.9 AKI (ACUTE KIDNEY INJURY) (HCC): ICD-10-CM

## 2023-04-10 DIAGNOSIS — I95.9 HYPOTENSION, UNSPECIFIED HYPOTENSION TYPE: Primary | ICD-10-CM

## 2023-04-10 DIAGNOSIS — D64.9 NORMOCYTIC NORMOCHROMIC ANEMIA: ICD-10-CM

## 2023-04-10 PROBLEM — E11.9 DIABETES MELLITUS TYPE 2, NONINSULIN DEPENDENT (HCC): Status: ACTIVE | Noted: 2023-04-10

## 2023-04-10 PROBLEM — N30.00 ACUTE CYSTITIS WITHOUT HEMATURIA: Status: ACTIVE | Noted: 2023-04-10

## 2023-04-10 LAB
ABSOLUTE EOS #: 0.12 K/UL (ref 0–0.44)
ABSOLUTE IMMATURE GRANULOCYTE: 0.03 K/UL (ref 0–0.3)
ABSOLUTE LYMPH #: 3.76 K/UL (ref 1.1–3.7)
ABSOLUTE MONO #: 0.52 K/UL (ref 0.1–1.2)
ABSOLUTE RETIC #: 0.08 M/UL (ref 0.03–0.08)
ALBUMIN SERPL-MCNC: 3.6 G/DL (ref 3.5–5.2)
ALBUMIN/GLOBULIN RATIO: 1 (ref 1–2.5)
ALP SERPL-CCNC: 88 U/L (ref 35–104)
ALT SERPL-CCNC: 8 U/L (ref 5–33)
ANION GAP SERPL CALCULATED.3IONS-SCNC: 13 MMOL/L (ref 9–17)
AST SERPL-CCNC: 11 U/L
BASOPHILS # BLD: 1 % (ref 0–2)
BASOPHILS ABSOLUTE: 0.1 K/UL (ref 0–0.2)
BILIRUB SERPL-MCNC: 0.2 MG/DL (ref 0.3–1.2)
BILIRUBIN URINE: NEGATIVE
BUN SERPL-MCNC: 17 MG/DL (ref 6–20)
CALCIUM SERPL-MCNC: 9.6 MG/DL (ref 8.6–10.4)
CASTS UA: ABNORMAL /LPF (ref 0–8)
CHLORIDE SERPL-SCNC: 102 MMOL/L (ref 98–107)
CHP ED QC CHECK: YES
CO2 SERPL-SCNC: 19 MMOL/L (ref 20–31)
COLOR: YELLOW
CREAT SERPL-MCNC: 1.5 MG/DL (ref 0.5–0.9)
EOSINOPHILS RELATIVE PERCENT: 1 % (ref 1–4)
EPITHELIAL CELLS UA: ABNORMAL /HPF (ref 0–5)
FERRITIN SERPL-MCNC: 515 NG/ML (ref 13–150)
GFR SERPL CREATININE-BSD FRML MDRD: 40 ML/MIN/1.73M2
GLUCOSE BLD-MCNC: 111 MG/DL (ref 65–105)
GLUCOSE BLD-MCNC: 187 MG/DL
GLUCOSE BLD-MCNC: 187 MG/DL (ref 65–105)
GLUCOSE SERPL-MCNC: 174 MG/DL (ref 70–99)
GLUCOSE UR STRIP.AUTO-MCNC: NEGATIVE MG/DL
HCT VFR BLD AUTO: 30.9 % (ref 36.3–47.1)
HGB BLD-MCNC: 9.5 G/DL (ref 11.9–15.1)
IMMATURE GRANULOCYTES: 0 %
IMMATURE RETIC FRACT: 6.4 % (ref 2.7–18.3)
IRON SATURATION: 21 % (ref 20–55)
IRON SERPL-MCNC: 46 UG/DL (ref 37–145)
KETONES UR STRIP.AUTO-MCNC: NEGATIVE MG/DL
LACTIC ACID, SEPSIS WHOLE BLOOD: 1.9 MMOL/L (ref 0.5–1.9)
LACTIC ACID, SEPSIS WHOLE BLOOD: 2.3 MMOL/L (ref 0.5–1.9)
LACTIC ACID, SEPSIS WHOLE BLOOD: 2.4 MMOL/L (ref 0.5–1.9)
LACTIC ACID, SEPSIS WHOLE BLOOD: 2.6 MMOL/L (ref 0.5–1.9)
LEUKOCYTE ESTERASE UR QL STRIP.AUTO: ABNORMAL
LYMPHOCYTES # BLD: 44 % (ref 24–43)
MAGNESIUM SERPL-MCNC: 1.8 MG/DL (ref 1.6–2.6)
MCH RBC QN AUTO: 26.9 PG (ref 25.2–33.5)
MCHC RBC AUTO-ENTMCNC: 30.7 G/DL (ref 28.4–34.8)
MCV RBC AUTO: 87.5 FL (ref 82.6–102.9)
MONOCYTES # BLD: 6 % (ref 3–12)
NITRITE UR QL STRIP.AUTO: NEGATIVE
NRBC AUTOMATED: 0 PER 100 WBC
PDW BLD-RTO: 16.7 % (ref 11.8–14.4)
PLATELET # BLD AUTO: 364 K/UL (ref 138–453)
PMV BLD AUTO: 9.6 FL (ref 8.1–13.5)
POTASSIUM SERPL-SCNC: 4.4 MMOL/L (ref 3.7–5.3)
PROT SERPL-MCNC: 7.3 G/DL (ref 6.4–8.3)
PROT UR STRIP.AUTO-MCNC: 5.5 MG/DL (ref 5–8)
PROT UR STRIP.AUTO-MCNC: NEGATIVE MG/DL
RBC # BLD: 3.53 M/UL (ref 3.95–5.11)
RBC # BLD: ABNORMAL 10*6/UL
RBC CLUMPS #/AREA URNS AUTO: ABNORMAL /HPF (ref 0–4)
RETIC HEMOGLOBIN: 31.2 PG (ref 28.2–35.7)
RETICS/RBC NFR AUTO: 2.5 % (ref 0.5–1.9)
SEG NEUTROPHILS: 47 % (ref 36–65)
SEGMENTED NEUTROPHILS ABSOLUTE COUNT: 3.94 K/UL (ref 1.5–8.1)
SODIUM SERPL-SCNC: 134 MMOL/L (ref 135–144)
SPECIFIC GRAVITY UA: 1.01 (ref 1–1.03)
TIBC SERPL-MCNC: 214 UG/DL (ref 250–450)
TROPONIN I SERPL DL<=0.01 NG/ML-MCNC: 18 NG/L (ref 0–14)
TROPONIN I SERPL DL<=0.01 NG/ML-MCNC: 25 NG/L (ref 0–14)
TURBIDITY: CLEAR
UNSATURATED IRON BINDING CAPACITY: 168 UG/DL (ref 112–347)
URINE HGB: NEGATIVE
UROBILINOGEN, URINE: NORMAL
WBC # BLD AUTO: 8.5 K/UL (ref 3.5–11.3)
WBC UA: ABNORMAL /HPF (ref 0–5)

## 2023-04-10 PROCEDURE — 96365 THER/PROPH/DIAG IV INF INIT: CPT

## 2023-04-10 PROCEDURE — 80053 COMPREHEN METABOLIC PANEL: CPT

## 2023-04-10 PROCEDURE — 83735 ASSAY OF MAGNESIUM: CPT

## 2023-04-10 PROCEDURE — 2580000003 HC RX 258: Performed by: INTERNAL MEDICINE

## 2023-04-10 PROCEDURE — 81001 URINALYSIS AUTO W/SCOPE: CPT

## 2023-04-10 PROCEDURE — 99222 1ST HOSP IP/OBS MODERATE 55: CPT | Performed by: NURSE PRACTITIONER

## 2023-04-10 PROCEDURE — 82728 ASSAY OF FERRITIN: CPT

## 2023-04-10 PROCEDURE — 87040 BLOOD CULTURE FOR BACTERIA: CPT

## 2023-04-10 PROCEDURE — 6370000000 HC RX 637 (ALT 250 FOR IP): Performed by: INTERNAL MEDICINE

## 2023-04-10 PROCEDURE — 83550 IRON BINDING TEST: CPT

## 2023-04-10 PROCEDURE — 71045 X-RAY EXAM CHEST 1 VIEW: CPT

## 2023-04-10 PROCEDURE — 85045 AUTOMATED RETICULOCYTE COUNT: CPT

## 2023-04-10 PROCEDURE — 2060000000 HC ICU INTERMEDIATE R&B

## 2023-04-10 PROCEDURE — 87086 URINE CULTURE/COLONY COUNT: CPT

## 2023-04-10 PROCEDURE — 6370000000 HC RX 637 (ALT 250 FOR IP): Performed by: NURSE PRACTITIONER

## 2023-04-10 PROCEDURE — 83605 ASSAY OF LACTIC ACID: CPT

## 2023-04-10 PROCEDURE — 6360000002 HC RX W HCPCS: Performed by: NURSE PRACTITIONER

## 2023-04-10 PROCEDURE — 83540 ASSAY OF IRON: CPT

## 2023-04-10 PROCEDURE — 93005 ELECTROCARDIOGRAM TRACING: CPT | Performed by: STUDENT IN AN ORGANIZED HEALTH CARE EDUCATION/TRAINING PROGRAM

## 2023-04-10 PROCEDURE — 85025 COMPLETE CBC W/AUTO DIFF WBC: CPT

## 2023-04-10 PROCEDURE — 6360000002 HC RX W HCPCS: Performed by: STUDENT IN AN ORGANIZED HEALTH CARE EDUCATION/TRAINING PROGRAM

## 2023-04-10 PROCEDURE — 76770 US EXAM ABDO BACK WALL COMP: CPT

## 2023-04-10 PROCEDURE — 82947 ASSAY GLUCOSE BLOOD QUANT: CPT

## 2023-04-10 PROCEDURE — 99285 EMERGENCY DEPT VISIT HI MDM: CPT

## 2023-04-10 PROCEDURE — 84484 ASSAY OF TROPONIN QUANT: CPT

## 2023-04-10 PROCEDURE — 2580000003 HC RX 258: Performed by: STUDENT IN AN ORGANIZED HEALTH CARE EDUCATION/TRAINING PROGRAM

## 2023-04-10 RX ORDER — ONDANSETRON 2 MG/ML
4 INJECTION INTRAMUSCULAR; INTRAVENOUS EVERY 6 HOURS PRN
Status: DISCONTINUED | OUTPATIENT
Start: 2023-04-10 | End: 2023-04-12 | Stop reason: HOSPADM

## 2023-04-10 RX ORDER — ACETAMINOPHEN 650 MG/1
650 SUPPOSITORY RECTAL EVERY 6 HOURS PRN
Status: DISCONTINUED | OUTPATIENT
Start: 2023-04-10 | End: 2023-04-12 | Stop reason: HOSPADM

## 2023-04-10 RX ORDER — ENOXAPARIN SODIUM 100 MG/ML
40 INJECTION SUBCUTANEOUS DAILY
Status: CANCELLED | OUTPATIENT
Start: 2023-04-10

## 2023-04-10 RX ORDER — INSULIN LISPRO 100 [IU]/ML
0-4 INJECTION, SOLUTION INTRAVENOUS; SUBCUTANEOUS NIGHTLY
Status: DISCONTINUED | OUTPATIENT
Start: 2023-04-10 | End: 2023-04-12 | Stop reason: HOSPADM

## 2023-04-10 RX ORDER — SODIUM CHLORIDE 0.9 % (FLUSH) 0.9 %
5-40 SYRINGE (ML) INJECTION EVERY 12 HOURS SCHEDULED
Status: DISCONTINUED | OUTPATIENT
Start: 2023-04-10 | End: 2023-04-12 | Stop reason: HOSPADM

## 2023-04-10 RX ORDER — ONDANSETRON 4 MG/1
4 TABLET, ORALLY DISINTEGRATING ORAL EVERY 8 HOURS PRN
Status: DISCONTINUED | OUTPATIENT
Start: 2023-04-10 | End: 2023-04-12 | Stop reason: HOSPADM

## 2023-04-10 RX ORDER — ACETAMINOPHEN 325 MG/1
650 TABLET ORAL EVERY 6 HOURS PRN
Status: DISCONTINUED | OUTPATIENT
Start: 2023-04-10 | End: 2023-04-12 | Stop reason: HOSPADM

## 2023-04-10 RX ORDER — HEPARIN SODIUM 5000 [USP'U]/ML
5000 INJECTION, SOLUTION INTRAVENOUS; SUBCUTANEOUS EVERY 8 HOURS SCHEDULED
Status: DISCONTINUED | OUTPATIENT
Start: 2023-04-10 | End: 2023-04-12 | Stop reason: HOSPADM

## 2023-04-10 RX ORDER — HYDROCODONE BITARTRATE AND ACETAMINOPHEN 5; 325 MG/1; MG/1
2 TABLET ORAL EVERY 4 HOURS PRN
Status: DISCONTINUED | OUTPATIENT
Start: 2023-04-10 | End: 2023-04-12 | Stop reason: HOSPADM

## 2023-04-10 RX ORDER — HYDROCODONE BITARTRATE AND ACETAMINOPHEN 5; 325 MG/1; MG/1
1 TABLET ORAL EVERY 4 HOURS PRN
Status: DISCONTINUED | OUTPATIENT
Start: 2023-04-10 | End: 2023-04-12 | Stop reason: HOSPADM

## 2023-04-10 RX ORDER — SODIUM CHLORIDE, SODIUM LACTATE, POTASSIUM CHLORIDE, AND CALCIUM CHLORIDE .6; .31; .03; .02 G/100ML; G/100ML; G/100ML; G/100ML
30 INJECTION, SOLUTION INTRAVENOUS ONCE
Status: COMPLETED | OUTPATIENT
Start: 2023-04-10 | End: 2023-04-10

## 2023-04-10 RX ORDER — INSULIN LISPRO 100 [IU]/ML
0-4 INJECTION, SOLUTION INTRAVENOUS; SUBCUTANEOUS
Status: DISCONTINUED | OUTPATIENT
Start: 2023-04-10 | End: 2023-04-12 | Stop reason: HOSPADM

## 2023-04-10 RX ORDER — POLYETHYLENE GLYCOL 3350 17 G/17G
17 POWDER, FOR SOLUTION ORAL DAILY PRN
Status: DISCONTINUED | OUTPATIENT
Start: 2023-04-10 | End: 2023-04-12 | Stop reason: HOSPADM

## 2023-04-10 RX ORDER — DEXTROSE MONOHYDRATE 100 MG/ML
INJECTION, SOLUTION INTRAVENOUS CONTINUOUS PRN
Status: DISCONTINUED | OUTPATIENT
Start: 2023-04-10 | End: 2023-04-12 | Stop reason: HOSPADM

## 2023-04-10 RX ORDER — SODIUM CHLORIDE 9 MG/ML
INJECTION, SOLUTION INTRAVENOUS PRN
Status: DISCONTINUED | OUTPATIENT
Start: 2023-04-10 | End: 2023-04-12 | Stop reason: HOSPADM

## 2023-04-10 RX ORDER — SODIUM CHLORIDE 0.9 % (FLUSH) 0.9 %
5-40 SYRINGE (ML) INJECTION PRN
Status: DISCONTINUED | OUTPATIENT
Start: 2023-04-10 | End: 2023-04-12 | Stop reason: HOSPADM

## 2023-04-10 RX ADMIN — POLYETHYLENE GLYCOL 3350 17 G: 17 POWDER, FOR SOLUTION ORAL at 22:16

## 2023-04-10 RX ADMIN — SODIUM CHLORIDE, POTASSIUM CHLORIDE, SODIUM LACTATE AND CALCIUM CHLORIDE 1593 ML: 600; 310; 30; 20 INJECTION, SOLUTION INTRAVENOUS at 12:00

## 2023-04-10 RX ADMIN — HEPARIN SODIUM 5000 UNITS: 5000 INJECTION INTRAVENOUS; SUBCUTANEOUS at 22:16

## 2023-04-10 RX ADMIN — SODIUM CHLORIDE, PRESERVATIVE FREE 10 ML: 5 INJECTION INTRAVENOUS at 21:24

## 2023-04-10 RX ADMIN — CEFTRIAXONE SODIUM 1000 MG: 1 INJECTION, POWDER, FOR SOLUTION INTRAMUSCULAR; INTRAVENOUS at 14:53

## 2023-04-10 RX ADMIN — HYDROCODONE BITARTRATE AND ACETAMINOPHEN 1 TABLET: 5; 325 TABLET ORAL at 22:34

## 2023-04-10 SDOH — HEALTH STABILITY: PHYSICAL HEALTH: ON AVERAGE, HOW MANY DAYS PER WEEK DO YOU ENGAGE IN MODERATE TO STRENUOUS EXERCISE (LIKE A BRISK WALK)?: 3 DAYS

## 2023-04-10 SDOH — SOCIAL STABILITY: SOCIAL INSECURITY: WITHIN THE LAST YEAR, HAVE YOU BEEN AFRAID OF YOUR PARTNER OR EX-PARTNER?: NO

## 2023-04-10 SDOH — HEALTH STABILITY: MENTAL HEALTH: HOW OFTEN DO YOU HAVE A DRINK CONTAINING ALCOHOL?: NEVER

## 2023-04-10 SDOH — SOCIAL STABILITY: SOCIAL NETWORK: IN A TYPICAL WEEK, HOW MANY TIMES DO YOU TALK ON THE PHONE WITH FAMILY, FRIENDS, OR NEIGHBORS?: TWICE A WEEK

## 2023-04-10 SDOH — ECONOMIC STABILITY: INCOME INSECURITY: IN THE LAST 12 MONTHS, WAS THERE A TIME WHEN YOU WERE NOT ABLE TO PAY THE MORTGAGE OR RENT ON TIME?: NO

## 2023-04-10 SDOH — SOCIAL STABILITY: SOCIAL INSECURITY
WITHIN THE LAST YEAR, HAVE YOU BEEN KICKED, HIT, SLAPPED, OR OTHERWISE PHYSICALLY HURT BY YOUR PARTNER OR EX-PARTNER?: NO

## 2023-04-10 SDOH — SOCIAL STABILITY: SOCIAL NETWORK: ARE YOU MARRIED, WIDOWED, DIVORCED, SEPARATED, NEVER MARRIED, OR LIVING WITH A PARTNER?: WIDOWED

## 2023-04-10 SDOH — ECONOMIC STABILITY: FOOD INSECURITY: WITHIN THE PAST 12 MONTHS, THE FOOD YOU BOUGHT JUST DIDN'T LAST AND YOU DIDN'T HAVE MONEY TO GET MORE.: NEVER TRUE

## 2023-04-10 SDOH — HEALTH STABILITY: MENTAL HEALTH
STRESS IS WHEN SOMEONE FEELS TENSE, NERVOUS, ANXIOUS, OR CAN'T SLEEP AT NIGHT BECAUSE THEIR MIND IS TROUBLED. HOW STRESSED ARE YOU?: ONLY A LITTLE

## 2023-04-10 SDOH — ECONOMIC STABILITY: INCOME INSECURITY: HOW HARD IS IT FOR YOU TO PAY FOR THE VERY BASICS LIKE FOOD, HOUSING, MEDICAL CARE, AND HEATING?: NOT HARD AT ALL

## 2023-04-10 SDOH — HEALTH STABILITY: PHYSICAL HEALTH: ON AVERAGE, HOW MANY MINUTES DO YOU ENGAGE IN EXERCISE AT THIS LEVEL?: 30 MIN

## 2023-04-10 SDOH — ECONOMIC STABILITY: HOUSING INSECURITY
IN THE LAST 12 MONTHS, WAS THERE A TIME WHEN YOU DID NOT HAVE A STEADY PLACE TO SLEEP OR SLEPT IN A SHELTER (INCLUDING NOW)?: NO

## 2023-04-10 SDOH — SOCIAL STABILITY: SOCIAL NETWORK: HOW OFTEN DO YOU GET TOGETHER WITH FRIENDS OR RELATIVES?: ONCE A WEEK

## 2023-04-10 SDOH — ECONOMIC STABILITY: FOOD INSECURITY: WITHIN THE PAST 12 MONTHS, YOU WORRIED THAT YOUR FOOD WOULD RUN OUT BEFORE YOU GOT MONEY TO BUY MORE.: NEVER TRUE

## 2023-04-10 SDOH — SOCIAL STABILITY: SOCIAL NETWORK: HOW OFTEN DO YOU ATTEND CHURCH OR RELIGIOUS SERVICES?: 1 TO 4 TIMES PER YEAR

## 2023-04-10 SDOH — SOCIAL STABILITY: SOCIAL NETWORK
DO YOU BELONG TO ANY CLUBS OR ORGANIZATIONS SUCH AS CHURCH GROUPS UNIONS, FRATERNAL OR ATHLETIC GROUPS, OR SCHOOL GROUPS?: NO

## 2023-04-10 SDOH — ECONOMIC STABILITY: TRANSPORTATION INSECURITY
IN THE PAST 12 MONTHS, HAS THE LACK OF TRANSPORTATION KEPT YOU FROM MEDICAL APPOINTMENTS OR FROM GETTING MEDICATIONS?: NO

## 2023-04-10 SDOH — ECONOMIC STABILITY: HOUSING INSECURITY: IN THE LAST 12 MONTHS, HOW MANY PLACES HAVE YOU LIVED?: 1

## 2023-04-10 SDOH — SOCIAL STABILITY: SOCIAL NETWORK: HOW OFTEN DO YOU ATTENT MEETINGS OF THE CLUB OR ORGANIZATION YOU BELONG TO?: NEVER

## 2023-04-10 SDOH — SOCIAL STABILITY: SOCIAL INSECURITY: WITHIN THE LAST YEAR, HAVE YOU BEEN HUMILIATED OR EMOTIONALLY ABUSED IN OTHER WAYS BY YOUR PARTNER OR EX-PARTNER?: NO

## 2023-04-10 SDOH — ECONOMIC STABILITY: TRANSPORTATION INSECURITY
IN THE PAST 12 MONTHS, HAS LACK OF TRANSPORTATION KEPT YOU FROM MEETINGS, WORK, OR FROM GETTING THINGS NEEDED FOR DAILY LIVING?: NO

## 2023-04-10 SDOH — SOCIAL STABILITY: SOCIAL INSECURITY
WITHIN THE LAST YEAR, HAVE TO BEEN RAPED OR FORCED TO HAVE ANY KIND OF SEXUAL ACTIVITY BY YOUR PARTNER OR EX-PARTNER?: NO

## 2023-04-10 SDOH — HEALTH STABILITY: MENTAL HEALTH: HOW MANY STANDARD DRINKS CONTAINING ALCOHOL DO YOU HAVE ON A TYPICAL DAY?: PATIENT DOES NOT DRINK

## 2023-04-10 ASSESSMENT — PAIN - FUNCTIONAL ASSESSMENT
PAIN_FUNCTIONAL_ASSESSMENT: 0-10
PAIN_FUNCTIONAL_ASSESSMENT: ACTIVITIES ARE NOT PREVENTED
PAIN_FUNCTIONAL_ASSESSMENT: ACTIVITIES ARE NOT PREVENTED

## 2023-04-10 ASSESSMENT — LIFESTYLE VARIABLES
HOW OFTEN DO YOU HAVE A DRINK CONTAINING ALCOHOL: NEVER
HOW MANY STANDARD DRINKS CONTAINING ALCOHOL DO YOU HAVE ON A TYPICAL DAY: PATIENT DOES NOT DRINK

## 2023-04-10 ASSESSMENT — PAIN DESCRIPTION - DESCRIPTORS
DESCRIPTORS: ACHING;DISCOMFORT
DESCRIPTORS: ACHING;DULL

## 2023-04-10 ASSESSMENT — PAIN DESCRIPTION - LOCATION
LOCATION: BACK

## 2023-04-10 ASSESSMENT — PAIN SCALES - GENERAL
PAINLEVEL_OUTOF10: 6

## 2023-04-10 ASSESSMENT — ENCOUNTER SYMPTOMS
ABDOMINAL PAIN: 0
NAUSEA: 0
RHINORRHEA: 0
SHORTNESS OF BREATH: 0
VOMITING: 0
BACK PAIN: 0
DIARRHEA: 0
COUGH: 0

## 2023-04-10 ASSESSMENT — PAIN DESCRIPTION - ORIENTATION
ORIENTATION: LOWER
ORIENTATION: MID

## 2023-04-10 NOTE — ED NOTES
Labeled blood specimens sent to lab via tube system.     [x] Lavender   [] on ice   [] Blue   [x] Green/yellow  [x] Green/black [] on ice  [] Pink  [] Red  [] Yellow  [] on ice  [] Blood Cultures        Vishnu Heart RN  04/10/23 1900

## 2023-04-10 NOTE — ED NOTES
Pt respirations are even and unlabored, pt is alert and oriented X 4, speaking in complete sentences, bed is in the lowest position, call light is within reach, NAD noted. Will continue to follow plan of care.         Anjel Guan RN  04/10/23 3418

## 2023-04-10 NOTE — ED PROVIDER NOTES
[facility]     Emergency Department     Faculty Attestation    I performed a history and physical examination of the patient and discussed management with the resident. I reviewed the residents note and agree with the documented findings and plan of care. Any areas of disagreement are noted on the chart. I was personally present for the key portions of any procedures. I have documented in the chart those procedures where I was not present during the key portions. I have reviewed the emergency nurses triage note. I agree with the chief complaint, past medical history, past surgical history, allergies, medications, social and family history as documented unless otherwise noted below. For Physician Assistant/ Nurse Practitioner cases/documentation I have personally evaluated this patient and have completed at least one if not all key elements of the E/M (history, physical exam, and MDM). Additional findings are as noted. Patient here with stated high blood pressure but patient is somewhat confused limiting history. Normal blood sugar from  is actually hypotensive. Recent sepsis admission urinary source. Patient appears ill but not toxic. No family here for additional independent history. Lungs clear heart is tachycardic but regular. Dry cracked mucous membranes. Abdomen soft no pulsatile abdominal mass no focal tenderness. Poor skin turgor. We will proceed with septic work-up anticipate admission    EKG interpretation: Sinus tachycardia 125 normal intervals normal axis. No acute ST or T changes.   No acute findings except rate    Critical Care     none    Jannet Christopher MD, Krysta Drake  Attending Emergency  Physician           Jannet Christopher MD  04/10/23 9210
with a voice recognition program.  Efforts were made to edit the dictations but occasionally words are mis-transcribed.)        Anastacio Mathur DO  Resident  04/10/23 2016

## 2023-04-10 NOTE — ED NOTES
Pt respirations are even and unlabored, pt is alert and oriented X 4, speaking in complete sentences, bed is in the lowest position, call light is within reach, NAD noted. Will continue to follow plan of care.         Wilson Kaba RN  04/10/23 7765

## 2023-04-10 NOTE — ED NOTES
Writer Perfect Served admitting service regarding morocho catheter order. Waiting on response.      Rome Guillen RN  04/10/23 0207

## 2023-04-10 NOTE — ED NOTES
Pt respirations are even and unlabored, pt is alert and oriented X 4, speaking in complete sentences, bed is in the lowest position, call light is within reach, NAD noted. Will continue to follow plan of care.         Singh Steele RN  04/10/23 2513

## 2023-04-10 NOTE — H&P
[facility]  Office: [address], DO, Dimple Garber, DO, Daniela Johnsoner, DO, Ezekiel Contreras Blood, DO, Herb Angelucci, MD, Ekaterina Gustafson MD, Ronaldo Dunn MD, Dena Holland MD,  Karla José MD, Roxie Powell MD, Gregor Partida, DO, Rosa Goldberg MD,  Peter Talbot MD, Carlos Holden MD, Mehul Vargas, DO, Sánchez Mahajan MD, Lucas Truong MD, Na Winter, DO, Guanako Edmond MD, Abdoulaye Chamberlain MD, Nancie Orantes MD, Melany Hood MD,  Washington Benitez, DO, Robyn Jarquin MD,  Giancarlo Tariq, CNP,  Roxanna Babinski, CNP, Boone Medrano, CNP, Veena Sanford, CNP,  Ramila Almonte, [facility], Shaista Faulkner, CNP, Nemesio Farias, CNP, Moiz Joya, CNP, Celi Blanco, CNP, Oscar Yanes, CNP, Marcel Jarvis PA-C, Thai Salvador, CNS, Ariella Abbott, CNP, Ondina Antunez, [facility]    HISTORY AND PHYSICAL EXAMINATION            Date:   4/10/2023  Patient name:  Thad Cross  Date of admission:  4/10/2023 11:18 AM  MRN:   [id number]  Account:  [de-identified]  YOB: 1965  PCP:    Debra Silvestre  Room:   29/29  Code Status:    Prior    Chief Complaint:     Hypotension/dizziness    History Obtained From:     patient, electronic medical record    History of Present Illness:     Thad Cross is a 61 y.o. Non- / non  female who presents with hypotension   and is admitted to the hospital for the management of Hypotension. This is a 58-year-old female who was recently seen and discharged from our services on 3/28/2023 after a 5-day course of stay for Klebsiella sepsis. She has a PMH significant for DMT2, mitral valve prolapse, urinary retention. She was treated with Rocephin and transition to Levaquin per infectious disease recommendations during last inpatient hospital stay.   She was discharged home she was unable to have home care secondary to poor PCP compliance/lack of PCP in 16

## 2023-04-10 NOTE — ED NOTES
Per Urology, pt is able to refuse straight and morocho but is to let RN know if she changes her mind. Pt reports she is able to urinate per self and refuses straight cath and morocho. Pt ambulated to restroom with steady gait.      Sommer Welsh RN  04/10/23 1988

## 2023-04-10 NOTE — ED NOTES
Pt up and ambulating to restroom with a steady gait. Pt to get urine sample but pt dropped urine cup in toilet. Unable to get urine sample at this time.       Jackie Palacios RN  04/10/23 7993

## 2023-04-10 NOTE — ED NOTES
Pt arrives to ED 29 via EMS. Pt states she has been feeling fatigue and hypotension the last couple days. Pt states she was just recently admitted here. Pt states she has BP medications at home but has been noncompliant. Pt denies any other complaints at this time. Pt respirations are even and unlabored, pt is alert and oriented X 4, speaking in complete sentences, bed is in the lowest position, call light is within reach, NAD noted. Will continue to follow plan of care.         Urbano Britt RN  04/10/23 3581

## 2023-04-10 NOTE — ED NOTES
Pt respirations are even and unlabored, pt is alert and oriented X 4, speaking in complete sentences, bed is in the lowest position, call light is within reach, NAD noted. Will continue to follow plan of care.         Rowan Fishman RN  04/10/23 6445

## 2023-04-10 NOTE — ED NOTES
Labeled blood specimens sent to lab via tube system.     [x] Lavender   [] on ice   [x] Blue   [x] Green/yellow  [x] Green/black [] on ice  [] Pink  [x] Red  [] Yellow  [] on ice  [] Blood Cultures       Anjel Guan RN  04/10/23 4309

## 2023-04-10 NOTE — ED NOTES
Per admitting service, writer to contact Urology regarding morocho placement. [address] [facility], waiting on response.      Batsheva Guzman RN  04/10/23 9820

## 2023-04-10 NOTE — ED NOTES
Sharmila Gu RN at bedside to place ceci morocho refusing stating she is going on her own and wants to speak with the physician. Writer Bisi Served admitting service, waiting on response.      Adrienne Tamayo RN  04/10/23 2250

## 2023-04-10 NOTE — ED NOTES
ED to inpatient nurses report      Chief Complaint:  Chief Complaint   Patient presents with    Hypertension     Present to ED from: Home    MOA:     LOC: alert and orientated to name, place, date  Mobility: Independent  Oxygen Baseline: 94%    Current needs required: None   Pending ED orders: Urine  Present condition: A&O x 4    Pertinent event(s): None      Mental Status:       Psych Assessment:   Psychosocial  Psychosocial (WDL): Within Defined Limits  Vital signs   Vitals:    04/10/23 1245 04/10/23 1300 04/10/23 1315 04/10/23 1330   BP: 86/66 87/68 100/66 99/71   Pulse: (!) 109 (!) 105 (!) 109 (!) 105   Resp: 13      Temp:       TempSrc:       SpO2:  94% 94% 94%   Weight:       Height:            Vitals:  Patient Vitals for the past 24 hrs:   BP Temp Temp src Pulse Resp SpO2 Height Weight   04/10/23 1330 99/71 -- -- (!) 105 -- 94 % -- --   04/10/23 1315 100/66 -- -- (!) 109 -- 94 % -- --   04/10/23 1300 87/68 -- -- (!) 105 -- 94 % -- --   04/10/23 1245 86/66 -- -- (!) 109 13 -- -- --   04/10/23 1230 80/61 -- -- (!) 110 12 -- -- --   04/10/23 1200 (!) 78/60 -- -- (!) 115 13 92 % -- --   04/10/23 1121 90/68 98.4 °F (36.9 °C) Oral (!) 124 15 94 % 5' 2\" (1.575 m) 117 lb (53.1 kg)      Visit Vitals  BP 99/71   Pulse (!) 105   Temp 98.4 °F (36.9 °C) (Oral)   Resp 13   Ht 5' 2\" (1.575 m)   Wt 117 lb (53.1 kg)   SpO2 94%   BMI 21.40 kg/m²        LDAs:   Peripheral IV 04/10/23 Right Forearm (Active)   Site Assessment Clean, dry & intact 04/10/23 1124   Line Status Blood return noted; Flushed 04/10/23 1124       Ambulatory Status:  Presents to emergency department  because of falls (Syncope, seizure, or loss of consciousness): No, Age > 70: No, Altered Mental Status, Intoxication with alcohol or substance confusion (Disorientation, impaired judgment, poor safety awaremess, or inability to follow instructions): No, Impaired Mobility: Ambulates or transfers with assistive devices or assistance;  Unable to ambulate or

## 2023-04-10 NOTE — ED NOTES
Pt O2 saturation at 89% with good waveform.  Put on O2 via NC at 2LPM. O2 saturation improves to 93%     Emely Albrecht RN  04/10/23 6248

## 2023-04-11 LAB
ANION GAP SERPL CALCULATED.3IONS-SCNC: 9 MMOL/L (ref 9–17)
BUN SERPL-MCNC: 9 MG/DL (ref 6–20)
CALCIUM SERPL-MCNC: 9.5 MG/DL (ref 8.6–10.4)
CHLORIDE SERPL-SCNC: 100 MMOL/L (ref 98–107)
CO2 SERPL-SCNC: 24 MMOL/L (ref 20–31)
CREAT SERPL-MCNC: 0.77 MG/DL (ref 0.5–0.9)
EKG ATRIAL RATE: 125 BPM
EKG P AXIS: 33 DEGREES
EKG P-R INTERVAL: 142 MS
EKG Q-T INTERVAL: 306 MS
EKG QRS DURATION: 76 MS
EKG QTC CALCULATION (BAZETT): 441 MS
EKG R AXIS: -2 DEGREES
EKG T AXIS: 15 DEGREES
EKG VENTRICULAR RATE: 125 BPM
GFR SERPL CREATININE-BSD FRML MDRD: >60 ML/MIN/1.73M2
GLUCOSE BLD-MCNC: 137 MG/DL (ref 65–105)
GLUCOSE BLD-MCNC: 143 MG/DL (ref 65–105)
GLUCOSE BLD-MCNC: 177 MG/DL (ref 65–105)
GLUCOSE SERPL-MCNC: 163 MG/DL (ref 70–99)
HCT VFR BLD AUTO: 29.3 % (ref 36.3–47.1)
HGB BLD-MCNC: 9.4 G/DL (ref 11.9–15.1)
LACTIC ACID, SEPSIS WHOLE BLOOD: 2.7 MMOL/L (ref 0.5–1.9)
LACTIC ACID, SEPSIS WHOLE BLOOD: 4.2 MMOL/L (ref 0.5–1.9)
MCH RBC QN AUTO: 27.1 PG (ref 25.2–33.5)
MCHC RBC AUTO-ENTMCNC: 32.1 G/DL (ref 28.4–34.8)
MCV RBC AUTO: 84.4 FL (ref 82.6–102.9)
MICROORGANISM SPEC CULT: NORMAL
NRBC AUTOMATED: 0 PER 100 WBC
PDW BLD-RTO: 16.3 % (ref 11.8–14.4)
PLATELET # BLD AUTO: 330 K/UL (ref 138–453)
PMV BLD AUTO: 9.9 FL (ref 8.1–13.5)
POTASSIUM SERPL-SCNC: 4.2 MMOL/L (ref 3.7–5.3)
RBC # BLD: 3.47 M/UL (ref 3.95–5.11)
SODIUM SERPL-SCNC: 133 MMOL/L (ref 135–144)
SPECIMEN DESCRIPTION: NORMAL
WBC # BLD AUTO: 8.3 K/UL (ref 3.5–11.3)

## 2023-04-11 PROCEDURE — 80048 BASIC METABOLIC PNL TOTAL CA: CPT

## 2023-04-11 PROCEDURE — 36415 COLL VENOUS BLD VENIPUNCTURE: CPT

## 2023-04-11 PROCEDURE — 2580000003 HC RX 258: Performed by: NURSE PRACTITIONER

## 2023-04-11 PROCEDURE — 6360000002 HC RX W HCPCS: Performed by: NURSE PRACTITIONER

## 2023-04-11 PROCEDURE — 2060000000 HC ICU INTERMEDIATE R&B

## 2023-04-11 PROCEDURE — 83605 ASSAY OF LACTIC ACID: CPT

## 2023-04-11 PROCEDURE — 2580000003 HC RX 258: Performed by: INTERNAL MEDICINE

## 2023-04-11 PROCEDURE — 6370000000 HC RX 637 (ALT 250 FOR IP): Performed by: NURSE PRACTITIONER

## 2023-04-11 PROCEDURE — 85027 COMPLETE CBC AUTOMATED: CPT

## 2023-04-11 PROCEDURE — 82947 ASSAY GLUCOSE BLOOD QUANT: CPT

## 2023-04-11 PROCEDURE — 93010 ELECTROCARDIOGRAM REPORT: CPT | Performed by: INTERNAL MEDICINE

## 2023-04-11 PROCEDURE — 99232 SBSQ HOSP IP/OBS MODERATE 35: CPT | Performed by: NURSE PRACTITIONER

## 2023-04-11 RX ORDER — SODIUM CHLORIDE 9 MG/ML
INJECTION, SOLUTION INTRAVENOUS CONTINUOUS
Status: DISCONTINUED | OUTPATIENT
Start: 2023-04-11 | End: 2023-04-12 | Stop reason: HOSPADM

## 2023-04-11 RX ADMIN — HYDROCODONE BITARTRATE AND ACETAMINOPHEN 1 TABLET: 5; 325 TABLET ORAL at 15:52

## 2023-04-11 RX ADMIN — HEPARIN SODIUM 5000 UNITS: 5000 INJECTION INTRAVENOUS; SUBCUTANEOUS at 22:14

## 2023-04-11 RX ADMIN — HEPARIN SODIUM 5000 UNITS: 5000 INJECTION INTRAVENOUS; SUBCUTANEOUS at 13:13

## 2023-04-11 RX ADMIN — HEPARIN SODIUM 5000 UNITS: 5000 INJECTION INTRAVENOUS; SUBCUTANEOUS at 06:23

## 2023-04-11 RX ADMIN — CEFTRIAXONE SODIUM 1000 MG: 10 INJECTION, POWDER, FOR SOLUTION INTRAVENOUS at 13:13

## 2023-04-11 RX ADMIN — SODIUM CHLORIDE: 9 INJECTION, SOLUTION INTRAVENOUS at 10:32

## 2023-04-11 RX ADMIN — SODIUM CHLORIDE, PRESERVATIVE FREE 10 ML: 5 INJECTION INTRAVENOUS at 19:51

## 2023-04-11 RX ADMIN — HYDROCODONE BITARTRATE AND ACETAMINOPHEN 1 TABLET: 5; 325 TABLET ORAL at 10:21

## 2023-04-11 RX ADMIN — HYDROCODONE BITARTRATE AND ACETAMINOPHEN 1 TABLET: 5; 325 TABLET ORAL at 06:22

## 2023-04-11 RX ADMIN — HYDROCODONE BITARTRATE AND ACETAMINOPHEN 1 TABLET: 5; 325 TABLET ORAL at 19:51

## 2023-04-11 RX ADMIN — SODIUM CHLORIDE, PRESERVATIVE FREE 10 ML: 5 INJECTION INTRAVENOUS at 10:22

## 2023-04-11 ASSESSMENT — PAIN DESCRIPTION - LOCATION
LOCATION: BACK

## 2023-04-11 ASSESSMENT — PAIN DESCRIPTION - ORIENTATION
ORIENTATION: MID
ORIENTATION: MID

## 2023-04-11 ASSESSMENT — PAIN SCALES - GENERAL
PAINLEVEL_OUTOF10: 6
PAINLEVEL_OUTOF10: 6
PAINLEVEL_OUTOF10: 0
PAINLEVEL_OUTOF10: 6
PAINLEVEL_OUTOF10: 6

## 2023-04-11 ASSESSMENT — PAIN DESCRIPTION - DESCRIPTORS
DESCRIPTORS: ACHING;DISCOMFORT
DESCRIPTORS: ACHING

## 2023-04-11 ASSESSMENT — PAIN DESCRIPTION - PAIN TYPE
TYPE: CHRONIC PAIN
TYPE: CHRONIC PAIN

## 2023-04-11 NOTE — CONSULTS
measures 8 x 7 x 10 mm. Kidneys demonstrate normal cortical echogenicity. No evidence of hydronephrosis or intrarenal stones. Bladder: Debris noted in the bladder. Bilateral ureteral jets are noted. Prevoid volume is 667 mL. Postvoid volume 868 mL. Mild left hydronephrosis appears increased. Left renal cortical mass. Recommend follow-up CT urogram to exclude neoplasm. Debris in the bladder may represent hemorrhage or infection. RECOMMENDATIONS: The findings were sent to the Radiology Results [address] at 6:52 pm on 4/10/2023 to be communicated to a licensed caregiver. XR CHEST PORTABLE    Result Date: 4/10/2023  EXAMINATION: ONE XRAY VIEW OF THE CHEST 4/10/2023 11:49 am COMPARISON: March 23, 2023 HISTORY: ORDERING SYSTEM PROVIDED HISTORY: hypotension TECHNOLOGIST PROVIDED HISTORY: hypotension Reason for Exam: port upright FINDINGS: The lungs are without acute focal process. There is no effusion or pneumothorax. The cardiomediastinal silhouette is without acute process. The osseous structures are without acute process. No acute process.        Assessment and Plan   Impression:    urinary retention and incomplete bladder emptying most likely due to diabetic bladder atony  Patient Active Problem List   Diagnosis    Displacement of lumbar intervertebral disc without myelopathy    Incomplete bladder emptying    Primary hypertension    Mitral valve prolapse    Anxiety    New onset type 2 diabetes mellitus ([address]    Pseudohyponatremia    GUERO (acute kidney injury) ([address]    Bilateral hydronephrosis    Bladder atony    Sepsis ([address]    Acute metabolic encephalopathy    Type 2 diabetes mellitus with hyperosmolar nonketotic hyperglycemia (HCC)    Acute anemia    Bandemia    High anion gap metabolic acidosis    Hypocalcemia    Lactic acid acidosis    Acute pyelonephritis    Septicemia due to Klebsiella pneumoniae (HCC)    Hyperglycemia    Hyponatremia    Lactic acidosis    Hypokalemia    Hypotension

## 2023-04-11 NOTE — ED NOTES
Report given to Adriana Thurston RN   All questions answered. T2R printed, signed, and sent up with patient.       Aysha Spicer RN  04/10/23 5100

## 2023-04-11 NOTE — ED NOTES
Pt bladder scanned after void. Pt educated on urinary retention and despite that she is urinating, her bladder isn't completely emptying. Pt verbalized understanding and is agreeable to urinary catheter ordered during day shift.         Lisa Salcido RN  04/10/23 8502

## 2023-04-11 NOTE — ED NOTES
Pt ambulated to bathroom with stand by assistance of RN. Pt stated she felt dizzy. Hat was placed in toilet to measure output. Pt missed hat during void. Pt assisted back into bed and full monitor placed back on patient.       Ericka Genao RN  04/10/23 5441

## 2023-04-11 NOTE — PROGRESS NOTES
[facility]  Office: [address], DO, Fracisco Pérez, DO, Filipe Crook, DO, Antonio Arriaza Blood, DO, Ange Thomas MD, Lien Iniguez MD, Dano Sullivan MD, Dayton Rodriguez MD,  Zeinab Sosa MD, Carl Park MD, Nathalia Jett, DO, Yamileth Vicente MD,  Rashid Pacheco MD, Flip Bender MD, Danne Lennox, DO, Vidya Catalan MD, Nikki Espinal MD, Lashell Phipps DO, Randy Quiles MD, Damian Palomares MD, Omar King MD, Kate Dean MD,  Tonia La DO, José Luis Weber MD,  Romeo Masterson, CNP,  Bang Hairston, CNP, Governor De La Torre, CNP, Bud Lopez, CNP,  Bola Rhodes, [facility], Meghan Bernabe, CNP, Severiano Hawkins, CNP, Ramona Rojas, CNP, Annabel Salas, CNP, Saud Lovell, CNP, Andrew Hale PATonoC, Demarcus Posadas, CNS, Alta Pena, CNP, Levi Virk, [facility]         [address]    Progress Note    4/11/2023    9:26 AM    Name:   Bony Esparza  MRN:     [id number]     [place]:      [de-identified]   Room:   2016/2016-01  IP Day:  1  Admit Date:  4/10/2023 11:18 AM    PCP:   Daylin Armando  Code Status:  Full Code    Subjective:     C/C:   Chief Complaint   Patient presents with    Hypotension     Interval History Status: improved. Patient evaluated in room, blood pressures been stable since admission. Now agreeable to Medina catheter as she is supposed to have been straight cathing herself at home stating she has not been doing this. Also does not know what medication she has been taking at home since her discharge a few weeks ago. Endorsing some low back pain, otherwise no other symptoms at this time    Brief History:     Bony Esparza is a 61 y.o. Non- / non  female who presents with hypotension   and is admitted to the hospital for the management of Hypotension.      This is a 59-year-old female who was recently seen and discharged from our services on 3/28/2023 after a 5-day

## 2023-04-11 NOTE — CARE COORDINATION
04/11/23 1223   Readmission Assessment   Number of Days since last admission? 8-30 days   Previous Disposition Home with Home Health   Who is being Interviewed Patient   What was the patient's/caregiver's perception as to why they think they needed to return back to the hospital? Other (Comment)  (new medical issue)   Did you visit your Primary Care Physician after you left the hospital, before you returned this time? Yes   Did you see a specialist, such as Cardiac, Pulmonary, Orthopedic Physician, etc. after you left the hospital? No   Who advised the patient to return to the hospital? Self-referral   Does the patient report anything that got in the way of taking their medications? No   In our efforts to provide the best possible care to you and others like you, can you think of anything that we could have done to help you after you left the hospital the first time, so that you might not have needed to return so soon?  Other (Comment)  (n/a)

## 2023-04-11 NOTE — PROGRESS NOTES
Provider messaged regarding patients complaint of pain 6/10 and alerted of current vitals. Orders added.

## 2023-04-11 NOTE — ED NOTES
The following labs labeled with pt sticker and tubed to lab:     [] Blue     [] Lavender   [] on ice  [] Green/yellow  [x] Green/black [x] on ice  [] Yellow  [] Red  [] Pink      [] COVID-19 swab    [] Rapid  [] PCR  [] Flu Swab  [] Strep Swab  [] Peds Viral Panel     [] Urine Sample  [] Pelvic Cultures  [] Blood Cultures   [] Wound Cultures        Fredi Reid RN  04/10/23 5084

## 2023-04-11 NOTE — CARE COORDINATION
Case Management Assessment  Initial Evaluation    Date/Time of Evaluation: 4/11/2023 12:18 PM  Assessment Completed by: Newell Bamberger, RN    If patient is discharged prior to next notation, then this note serves as note for discharge by case management. Patient Name: Chata Aranda                   YOB: 1965  Diagnosis: Hypotension [I95.9]  SIRS (systemic inflammatory response syndrome) ([facility] [R65.10]  GUERO (acute kidney injury) ([facility] [N17.9]  Hypotension, unspecified hypotension type [I95.9]                   Date / Time: 4/10/2023 11:18 AM    Patient Admission Status: Inpatient   Readmission Risk (Low < 19, Mod (19-27), High > 27): Readmission Risk Score: 22.2    Current PCP: Deana Ivy  PCP verified by CM? (P) Yes    Chart Reviewed: Yes      History Provided by: (P) Patient  Patient Orientation: (P) Alert and Oriented    Patient Cognition: (P) Alert    Hospitalization in the last 30 days (Readmission):  Yes    If yes, Readmission Assessment in CM Navigator will be completed. Advance Directives:      Code Status: Full Code   Patient's Primary Decision Maker is: (P) Legal Next of Kin      Discharge Planning:    Patient lives with: (P) Alone Type of Home: (P) Apartment  Primary Care Giver: (P) Other (Comment) (mikel, [address] [place] [address])  Patient Support Systems include: (P) Spouse/Significant Other, /, Home Care Staff   Current Financial resources:    Current community resources:    Current services prior to admission: (P) Home Care            Current DME:              Type of Home Care services:  (P) OT, PT, Nursing Services    ADLS  Prior functional level: (P) Independent in ADLs/IADLs  Current functional level: (P) Independent in ADLs/IADLs    PT AM-PAC:   /24  OT AM-PAC:   /24    Family can provide assistance at DC: (P) Yes  Would you like Case Management to discuss the discharge plan with any other family members/significant others, and if so, who?     Plans to

## 2023-04-12 ENCOUNTER — APPOINTMENT (OUTPATIENT)
Dept: GENERAL RADIOLOGY | Age: 58
End: 2023-04-12
Payer: COMMERCIAL

## 2023-04-12 ENCOUNTER — HOSPITAL ENCOUNTER (EMERGENCY)
Age: 58
Discharge: HOME | End: 2023-04-13
Attending: EMERGENCY MEDICINE
Payer: COMMERCIAL

## 2023-04-12 VITALS
TEMPERATURE: 98 F | HEART RATE: 94 BPM | WEIGHT: 117 LBS | RESPIRATION RATE: 16 BRPM | HEIGHT: 62 IN | OXYGEN SATURATION: 95 % | SYSTOLIC BLOOD PRESSURE: 145 MMHG | BODY MASS INDEX: 21.53 KG/M2 | DIASTOLIC BLOOD PRESSURE: 91 MMHG

## 2023-04-12 DIAGNOSIS — R00.0 TACHYCARDIA: Primary | ICD-10-CM

## 2023-04-12 DIAGNOSIS — E86.0 DEHYDRATION: ICD-10-CM

## 2023-04-12 PROBLEM — I95.9 HYPOTENSION: Status: RESOLVED | Noted: 2023-04-10 | Resolved: 2023-04-12

## 2023-04-12 PROBLEM — N30.00 ACUTE CYSTITIS WITHOUT HEMATURIA: Status: RESOLVED | Noted: 2023-04-10 | Resolved: 2023-04-12

## 2023-04-12 PROBLEM — N17.9 AKI (ACUTE KIDNEY INJURY) (HCC): Status: RESOLVED | Noted: 2023-02-18 | Resolved: 2023-04-12

## 2023-04-12 PROBLEM — E87.20 LACTIC ACIDOSIS: Status: RESOLVED | Noted: 2023-03-24 | Resolved: 2023-04-12

## 2023-04-12 LAB
ANION GAP SERPL CALCULATED.3IONS-SCNC: 9 MMOL/L (ref 9–17)
BUN SERPL-MCNC: 8 MG/DL (ref 6–20)
CALCIUM SERPL-MCNC: 8.5 MG/DL (ref 8.6–10.4)
CHLORIDE SERPL-SCNC: 108 MMOL/L (ref 98–107)
CO2 SERPL-SCNC: 20 MMOL/L (ref 20–31)
CREAT SERPL-MCNC: 0.6 MG/DL (ref 0.5–0.9)
GFR SERPL CREATININE-BSD FRML MDRD: >60 ML/MIN/1.73M2
GLUCOSE BLD-MCNC: 147 MG/DL (ref 65–105)
GLUCOSE BLD-MCNC: 159 MG/DL (ref 65–105)
GLUCOSE SERPL-MCNC: 140 MG/DL (ref 70–99)
HCT VFR BLD AUTO: 29.2 % (ref 36.3–47.1)
HGB BLD-MCNC: 8.7 G/DL (ref 11.9–15.1)
MCH RBC QN AUTO: 27 PG (ref 25.2–33.5)
MCHC RBC AUTO-ENTMCNC: 29.8 G/DL (ref 28.4–34.8)
MCV RBC AUTO: 90.7 FL (ref 82.6–102.9)
NRBC AUTOMATED: 0 PER 100 WBC
PDW BLD-RTO: 16.3 % (ref 11.8–14.4)
PLATELET # BLD AUTO: 300 K/UL (ref 138–453)
PMV BLD AUTO: 9.1 FL (ref 8.1–13.5)
POTASSIUM SERPL-SCNC: 3.9 MMOL/L (ref 3.7–5.3)
RBC # BLD: 3.22 M/UL (ref 3.95–5.11)
SODIUM SERPL-SCNC: 137 MMOL/L (ref 135–144)
WBC # BLD AUTO: 8.2 K/UL (ref 3.5–11.3)

## 2023-04-12 PROCEDURE — 6370000000 HC RX 637 (ALT 250 FOR IP): Performed by: NURSE PRACTITIONER

## 2023-04-12 PROCEDURE — 99285 EMERGENCY DEPT VISIT HI MDM: CPT

## 2023-04-12 PROCEDURE — 99239 HOSP IP/OBS DSCHRG MGMT >30: CPT | Performed by: NURSE PRACTITIONER

## 2023-04-12 PROCEDURE — 36415 COLL VENOUS BLD VENIPUNCTURE: CPT

## 2023-04-12 PROCEDURE — 6370000000 HC RX 637 (ALT 250 FOR IP): Performed by: INTERNAL MEDICINE

## 2023-04-12 PROCEDURE — 80048 BASIC METABOLIC PNL TOTAL CA: CPT

## 2023-04-12 PROCEDURE — 6360000002 HC RX W HCPCS: Performed by: NURSE PRACTITIONER

## 2023-04-12 PROCEDURE — 85027 COMPLETE CBC AUTOMATED: CPT

## 2023-04-12 PROCEDURE — 93005 ELECTROCARDIOGRAM TRACING: CPT | Performed by: STUDENT IN AN ORGANIZED HEALTH CARE EDUCATION/TRAINING PROGRAM

## 2023-04-12 PROCEDURE — 96360 HYDRATION IV INFUSION INIT: CPT

## 2023-04-12 PROCEDURE — 71046 X-RAY EXAM CHEST 2 VIEWS: CPT

## 2023-04-12 PROCEDURE — 2580000003 HC RX 258: Performed by: NURSE PRACTITIONER

## 2023-04-12 PROCEDURE — 82947 ASSAY GLUCOSE BLOOD QUANT: CPT

## 2023-04-12 PROCEDURE — 2580000003 HC RX 258: Performed by: INTERNAL MEDICINE

## 2023-04-12 PROCEDURE — 51798 US URINE CAPACITY MEASURE: CPT

## 2023-04-12 RX ORDER — PROPRANOLOL HYDROCHLORIDE 10 MG/1
10 TABLET ORAL 2 TIMES DAILY
Status: DISCONTINUED | OUTPATIENT
Start: 2023-04-12 | End: 2023-04-12 | Stop reason: HOSPADM

## 2023-04-12 RX ORDER — BLOOD PRESSURE TEST KIT
1 KIT MISCELLANEOUS 2 TIMES DAILY PRN
Qty: 1 KIT | Refills: 0 | Status: SHIPPED | OUTPATIENT
Start: 2023-04-12

## 2023-04-12 RX ORDER — TAMSULOSIN HYDROCHLORIDE 0.4 MG/1
0.4 CAPSULE ORAL DAILY
Status: DISCONTINUED | OUTPATIENT
Start: 2023-04-12 | End: 2023-04-12 | Stop reason: HOSPADM

## 2023-04-12 RX ORDER — VITAMIN B COMPLEX
1000 TABLET ORAL DAILY
Status: DISCONTINUED | OUTPATIENT
Start: 2023-04-12 | End: 2023-04-12 | Stop reason: HOSPADM

## 2023-04-12 RX ADMIN — HYDROCODONE BITARTRATE AND ACETAMINOPHEN 1 TABLET: 5; 325 TABLET ORAL at 10:35

## 2023-04-12 RX ADMIN — METFORMIN HYDROCHLORIDE 1000 MG: 500 TABLET ORAL at 11:06

## 2023-04-12 RX ADMIN — SODIUM CHLORIDE, PRESERVATIVE FREE 10 ML: 5 INJECTION INTRAVENOUS at 08:30

## 2023-04-12 RX ADMIN — HYDROCODONE BITARTRATE AND ACETAMINOPHEN 1 TABLET: 5; 325 TABLET ORAL at 01:33

## 2023-04-12 RX ADMIN — Medication 1000 UNITS: at 11:06

## 2023-04-12 RX ADMIN — CEFTRIAXONE SODIUM 1000 MG: 10 INJECTION, POWDER, FOR SOLUTION INTRAVENOUS at 10:36

## 2023-04-12 RX ADMIN — HYDROCODONE BITARTRATE AND ACETAMINOPHEN 1 TABLET: 5; 325 TABLET ORAL at 06:10

## 2023-04-12 RX ADMIN — TAMSULOSIN HYDROCHLORIDE 0.4 MG: 0.4 CAPSULE ORAL at 11:06

## 2023-04-12 RX ADMIN — HYDROCODONE BITARTRATE AND ACETAMINOPHEN 1 TABLET: 5; 325 TABLET ORAL at 15:25

## 2023-04-12 RX ADMIN — HEPARIN SODIUM 5000 UNITS: 5000 INJECTION INTRAVENOUS; SUBCUTANEOUS at 15:27

## 2023-04-12 RX ADMIN — HEPARIN SODIUM 5000 UNITS: 5000 INJECTION INTRAVENOUS; SUBCUTANEOUS at 06:10

## 2023-04-12 RX ADMIN — PROPRANOLOL HYDROCHLORIDE 10 MG: 10 TABLET ORAL at 10:35

## 2023-04-12 RX ADMIN — ACETAMINOPHEN 650 MG: 325 TABLET ORAL at 08:34

## 2023-04-12 ASSESSMENT — PAIN DESCRIPTION - ORIENTATION
ORIENTATION: RIGHT;LEFT
ORIENTATION: MID
ORIENTATION: RIGHT;LEFT;MID
ORIENTATION: MID
ORIENTATION: RIGHT;LEFT;LOWER

## 2023-04-12 ASSESSMENT — PAIN - FUNCTIONAL ASSESSMENT
PAIN_FUNCTIONAL_ASSESSMENT: ACTIVITIES ARE NOT PREVENTED

## 2023-04-12 ASSESSMENT — PAIN DESCRIPTION - DESCRIPTORS
DESCRIPTORS: ACHING;DULL;DISCOMFORT
DESCRIPTORS: ACHING
DESCRIPTORS: ACHING;DISCOMFORT;DULL
DESCRIPTORS: ACHING
DESCRIPTORS: ACHING
DESCRIPTORS: ACHING;DULL

## 2023-04-12 ASSESSMENT — PAIN DESCRIPTION - LOCATION
LOCATION: BACK
LOCATION: FLANK
LOCATION: BACK
LOCATION: BACK

## 2023-04-12 ASSESSMENT — PAIN SCALES - GENERAL
PAINLEVEL_OUTOF10: 6
PAINLEVEL_OUTOF10: 4
PAINLEVEL_OUTOF10: 6
PAINLEVEL_OUTOF10: 5
PAINLEVEL_OUTOF10: 6

## 2023-04-12 NOTE — PLAN OF CARE
Problem: Discharge Planning  Goal: Discharge to home or other facility with appropriate resources  4/12/2023 1701 by Drew Velez RN  Outcome: Completed  4/12/2023 1306 by Drew Velez RN  Outcome: Progressing     Problem: Pain  Goal: Verbalizes/displays adequate comfort level or baseline comfort level  4/12/2023 1701 by Drew Velez RN  Outcome: Completed  4/12/2023 1306 by Drew Velez RN  Outcome: Progressing     Problem: Safety - Adult  Goal: Free from fall injury  4/12/2023 1701 by Drew Velez RN  Outcome: Completed  4/12/2023 1306 by Drew Velez RN  Outcome: Progressing     Problem: ABCDS Injury Assessment  Goal: Absence of physical injury  4/12/2023 1701 by Drew Velez RN  Outcome: Completed  4/12/2023 1306 by Drew Velez RN  Outcome: Progressing  Flowsheets (Taken 4/12/2023 1300)  Absence of Physical Injury: Implement safety measures based on patient assessment     Problem: Skin/Tissue Integrity  Goal: Absence of new skin breakdown  Description: 1. Monitor for areas of redness and/or skin breakdown  2. Assess vascular access sites hourly  3. Every 4-6 hours minimum:  Change oxygen saturation probe site  4. Every 4-6 hours:  If on nasal continuous positive airway pressure, respiratory therapy assess nares and determine need for appliance change or resting period.   4/12/2023 1701 by Drew Velez RN  Outcome: Completed  4/12/2023 1306 by Drew Velez RN  Outcome: Progressing     Problem: Chronic Conditions and Co-morbidities  Goal: Patient's chronic conditions and co-morbidity symptoms are monitored and maintained or improved  4/12/2023 1701 by Drew Velez RN  Outcome: Completed  4/12/2023 1306 by Drew Velez RN  Outcome: Progressing

## 2023-04-12 NOTE — CARE COORDINATION
Plan discussed with pt. Pt to discharge home with [facility]. [address] notified of patient discharge    [address] [facility] for cab pickup. Patient and RN notified. Trip # [id number] pickup time 16:45 Naeem Son 070    1640: Pt informed Irma Carmichael that she has a family member here to pick her up. Cuca called to cancel cab trip.     Discharge [facility] Case Management Department  Written by: Felipe Estrella RN    Patient Name: Dianne Dunbar  Attending Provider: Harriett Raines MD  Admit Date: 4/10/2023 11:18 AM  MRN: [id number]  Account: [de-identified]                     : 1965  Discharge Date: 2023      Disposition: home    Felipe Estrella RN

## 2023-04-12 NOTE — DISCHARGE SUMMARY
[facility]  Office: [address], DO, Josee Ward, DO, Rachid Edmondson, DO, Cornellmick Morgan Blood, DO, Annel Stratton MD, Elenita Lyons MD, Yas Sales MD, Andi Fontenot MD,  Boubacar Rodriguez MD, Marisela Lynn MD, Franck Valles, DO, Vinayak Lala MD,  Dexter Watson MD, Rosario Mora MD, Ilana Dugan DO, Alber Luna MD, Roldan Brewer MD, Amalia Boswell DO, Suzi Webb MD, Mikaela Harrison MD, Kishor Stover MD, Nella English MD,  Roawn Toth DO, Mika Webster MD,  Theresa Interiano, CNP,  Veena Pyle, CNP, Saulo Hinojosa, CNP, Nando Montejo, CNP,  Rigo Hernandez, GERARD, Britni Grey, CNP, Yemi Hughes, CNP, Feroz Cuba, CNP, Shorty Card, CNP, Paul Reynoso, CNP, Ilsa Liriano PA-C, Marlene Donnelly, CNS, Kelsey Damon, CNP, Sheridan Melton, CNP         [address] [place] [address]    Discharge Summary     Patient ID: Sam Raines  :  1965   MRN: [id number]     ACCOUNT:  [de-identified]   Patient's PCP: Krzysztof Yousif Date: 4/10/2023   Discharge Date: 2023     Length of Stay: 2  Code Status:  Full Code  Admitting Physician: Franck Valles DO  Discharge Physician: ASHOK Bolton NP     Active Discharge Diagnoses:     Hospital Problem Lists:  Principal Problem (Resolved):    Hypotension  Active Problems:    Incomplete bladder emptying    Normocytic normochromic anemia    Diabetes mellitus type 2, noninsulin dependent ([facility]  Resolved Problems:    GUERO (acute kidney injury) ([facility] [address]    Lactic acidosis    Acute cystitis without hematuria      Admission Condition:  fair     Discharged Condition: stable    Hospital Stay:     Hospital Course:  Sam Raines is a 61 y.o. female who was admitted for the management of   Hypotension , presented to ER with Hypotension    This is a 59-year-old female who was recently seen and discharged from our services on 3/28/2023 after a 5-day course of stay

## 2023-04-12 NOTE — PLAN OF CARE
Problem: Discharge Planning  Goal: Discharge to home or other facility with appropriate resources  Outcome: Progressing     Problem: Pain  Goal: Verbalizes/displays adequate comfort level or baseline comfort level  Outcome: Progressing     Problem: Safety - Adult  Goal: Free from fall injury  Outcome: Progressing     Problem: ABCDS Injury Assessment  Goal: Absence of physical injury  Outcome: Progressing  Flowsheets (Taken 4/12/2023 1300)  Absence of Physical Injury: Implement safety measures based on patient assessment     Problem: Skin/Tissue Integrity  Goal: Absence of new skin breakdown  Description: 1. Monitor for areas of redness and/or skin breakdown  2. Assess vascular access sites hourly  3. Every 4-6 hours minimum:  Change oxygen saturation probe site  4. Every 4-6 hours:  If on nasal continuous positive airway pressure, respiratory therapy assess nares and determine need for appliance change or resting period.   Outcome: Progressing     Problem: Chronic Conditions and Co-morbidities  Goal: Patient's chronic conditions and co-morbidity symptoms are monitored and maintained or improved  Outcome: Progressing

## 2023-04-12 NOTE — DISCHARGE INSTR - COC
Continuity of Care Form    Patient Name: Sampson Joel   :  1965  MRN:  [id number]    [facility] date:  4/10/2023  Discharge date:     Code Status Order: Full Code   Advance Directives:     Admitting Physician:  Can Baker DO  PCP: Simona Musa    Discharging Nurse: [facility]- [facility] Unit/Room#:   Discharging Unit Phone Number: [phone]    Emergency Contact:   Extended Emergency Contact Information  Primary Emergency Contact: [facility]  Address: [address], [address] Jonathan Wise of [address] Phone: [phone]  Work Phone: [phone]  Mobile Phone: [phone]  Relation: Brother/Sister  Hearing or visual needs: None  Other needs: None  Preferred language: English   needed? No  Secondary Emergency Contact: Sandip Geller   Jonathan Wise of [address] Phone: [phone]  Work Phone: [phone]  Mobile Phone: [phone]  Relation: Child  Hearing or visual needs: None  Other needs: None  Preferred language: English   needed? No    Past Surgical History:  Past Surgical History:   Procedure Laterality Date     SECTION      HYSTERECTOMY (CERVIX STATUS UNKNOWN)  2008    TONSILLECTOMY         Immunization History: There is no immunization history on file for this patient.     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

## 2023-04-12 NOTE — PROGRESS NOTES
[facility]  Office: [address], DO, Mika Gallardo, DO, [facility], DO, [facility] Blood, DO, Jalil Sharma MD, Yue Pierre MD, Li Alicea MD, Madhuri Carrasco MD,  Cristian Bentley MD, Yvette Anderson MD, Josselyn Zhao, DO, Manoj Li MD,  Saima Cabello MD, Jefry Piper MD, Genna Shelley, DO, Delfin Renteria MD, Miranda Ford MD, Paulino aBrriga, DO, Shane Jin MD, Lourena Goltz, MD, Sera Mckeon MD, Michae Hammans, MD,  Damion Nayak, DO, Mehul Wilks MD,  Letty Mejia, CNP,  Amaris Garcia, CNP, Mika Orellana, CNP, Rukhsana Moody, CNP,  Varinder Boyer, [facility], Pauline Baker, CNP, Malu Alvarez, CNP, Teetee Olivo, CNP, Dante Chawla, CNP, Kristie Mar, CNP, Tessy Torres PA-C, Isaura Serrano, [facility], Alex Mckeon, CNP, Diana Sousa, [facility]         [facility]    HISTORY AND PHYSICAL EXAMINATION            Date:   4/12/2023  Patient name:  Fredis Zelaya  Date of admission:  4/10/2023 11:18 AM  MRN:   [id number]  Account:  [de-identified]  YOB: 1965  PCP:    Jeanne Wilks  Room:   2016/2016-01  Code Status:    Full Code    Chief Complaint:     Hypotension/dizziness    History Obtained From:     patient, electronic medical record    History of Present Illness:     Fredis Zelaya is a 61 y.o. Non- / non  female who presents with hypotension   and is admitted to the hospital for the management of Hypotension. This is a 59-year-old female who was recently seen and discharged from our services on 3/28/2023 after a 5-day course of stay for Klebsiella sepsis. She has a PMH significant for DMT2, mitral valve prolapse, urinary retention. She was treated with Rocephin and transition to Levaquin per infectious disease recommendations during last inpatient hospital stay.   She was discharged home she was unable to have home care secondary to poor PCP

## 2023-04-12 NOTE — PLAN OF CARE
Problem: ABCDS Injury Assessment  Goal: Absence of physical injury  Recent Flowsheet Documentation  Taken 4/11/2023 2052 by Pascale Rodrigues RN  Absence of Physical Injury: Implement safety measures based on patient assessment

## 2023-04-13 VITALS
TEMPERATURE: 98.9 F | HEART RATE: 107 BPM | SYSTOLIC BLOOD PRESSURE: 111 MMHG | DIASTOLIC BLOOD PRESSURE: 73 MMHG | OXYGEN SATURATION: 98 % | RESPIRATION RATE: 19 BRPM

## 2023-04-13 LAB
ALBUMIN SERPL-MCNC: 3.3 G/DL (ref 3.5–5.2)
ALBUMIN/GLOBULIN RATIO: 0.9 (ref 1–2.5)
ALP SERPL-CCNC: 94 U/L (ref 35–104)
ALT SERPL-CCNC: 8 U/L (ref 5–33)
ANION GAP SERPL CALCULATED.3IONS-SCNC: 12 MMOL/L (ref 9–17)
AST SERPL-CCNC: 11 U/L
BILIRUB SERPL-MCNC: <0.1 MG/DL (ref 0.3–1.2)
BUN SERPL-MCNC: 6 MG/DL (ref 6–20)
CALCIUM SERPL-MCNC: 9.3 MG/DL (ref 8.6–10.4)
CHLORIDE SERPL-SCNC: 107 MMOL/L (ref 98–107)
CO2 SERPL-SCNC: 18 MMOL/L (ref 20–31)
CREAT SERPL-MCNC: 0.6 MG/DL (ref 0.5–0.9)
EKG ATRIAL RATE: 114 BPM
EKG P AXIS: 41 DEGREES
EKG P-R INTERVAL: 138 MS
EKG Q-T INTERVAL: 322 MS
EKG QRS DURATION: 74 MS
EKG QTC CALCULATION (BAZETT): 443 MS
EKG R AXIS: 0 DEGREES
EKG VENTRICULAR RATE: 114 BPM
GFR SERPL CREATININE-BSD FRML MDRD: >60 ML/MIN/1.73M2
GLUCOSE SERPL-MCNC: 117 MG/DL (ref 70–99)
HCT VFR BLD AUTO: 29.5 % (ref 36.3–47.1)
HGB BLD-MCNC: 8.9 G/DL (ref 11.9–15.1)
MAGNESIUM SERPL-MCNC: 1.6 MG/DL (ref 1.6–2.6)
MCH RBC QN AUTO: 27.1 PG (ref 25.2–33.5)
MCHC RBC AUTO-ENTMCNC: 30.2 G/DL (ref 28.4–34.8)
MCV RBC AUTO: 89.9 FL (ref 82.6–102.9)
NRBC AUTOMATED: 0 PER 100 WBC
PDW BLD-RTO: 16.5 % (ref 11.8–14.4)
PLATELET # BLD AUTO: 339 K/UL (ref 138–453)
PMV BLD AUTO: 9.1 FL (ref 8.1–13.5)
POTASSIUM SERPL-SCNC: 3.7 MMOL/L (ref 3.7–5.3)
PROT SERPL-MCNC: 7.1 G/DL (ref 6.4–8.3)
RBC # BLD: 3.28 M/UL (ref 3.95–5.11)
SODIUM SERPL-SCNC: 137 MMOL/L (ref 135–144)
TROPONIN I SERPL DL<=0.01 NG/ML-MCNC: 12 NG/L (ref 0–14)
TROPONIN I SERPL DL<=0.01 NG/ML-MCNC: 12 NG/L (ref 0–14)
WBC # BLD AUTO: 11.5 K/UL (ref 3.5–11.3)

## 2023-04-13 PROCEDURE — 85027 COMPLETE CBC AUTOMATED: CPT

## 2023-04-13 PROCEDURE — 96360 HYDRATION IV INFUSION INIT: CPT

## 2023-04-13 PROCEDURE — 83735 ASSAY OF MAGNESIUM: CPT

## 2023-04-13 PROCEDURE — 51702 INSERT TEMP BLADDER CATH: CPT

## 2023-04-13 PROCEDURE — 51798 US URINE CAPACITY MEASURE: CPT

## 2023-04-13 PROCEDURE — 93010 ELECTROCARDIOGRAM REPORT: CPT | Performed by: INTERNAL MEDICINE

## 2023-04-13 PROCEDURE — 80053 COMPREHEN METABOLIC PANEL: CPT

## 2023-04-13 PROCEDURE — 84484 ASSAY OF TROPONIN QUANT: CPT

## 2023-04-13 PROCEDURE — 2580000003 HC RX 258: Performed by: STUDENT IN AN ORGANIZED HEALTH CARE EDUCATION/TRAINING PROGRAM

## 2023-04-13 RX ORDER — 0.9 % SODIUM CHLORIDE 0.9 %
1000 INTRAVENOUS SOLUTION INTRAVENOUS ONCE
Status: COMPLETED | OUTPATIENT
Start: 2023-04-13 | End: 2023-04-13

## 2023-04-13 RX ADMIN — SODIUM CHLORIDE 1000 ML: 9 INJECTION, SOLUTION INTRAVENOUS at 00:55

## 2023-04-13 RX ADMIN — SODIUM CHLORIDE 1000 ML: 9 INJECTION, SOLUTION INTRAVENOUS at 02:26

## 2023-04-13 ASSESSMENT — ENCOUNTER SYMPTOMS
ABDOMINAL PAIN: 0
BACK PAIN: 0
SHORTNESS OF BREATH: 0

## 2023-04-13 ASSESSMENT — PAIN - FUNCTIONAL ASSESSMENT: PAIN_FUNCTIONAL_ASSESSMENT: 0-10

## 2023-04-13 ASSESSMENT — PAIN DESCRIPTION - LOCATION: LOCATION: BACK

## 2023-04-13 ASSESSMENT — PAIN DESCRIPTION - ORIENTATION: ORIENTATION: LOWER

## 2023-04-13 ASSESSMENT — PAIN DESCRIPTION - PAIN TYPE: TYPE: CHRONIC PAIN

## 2023-04-13 ASSESSMENT — PAIN SCALES - GENERAL: PAINLEVEL_OUTOF10: 6

## 2023-04-13 NOTE — ED PROVIDER NOTES
[address] [facility]     Emergency Department     Faculty Attestation    I performed a history and physical examination of the patient and discussed management with the resident. I reviewed the residents note and agree with the documented findings and plan of care. Any areas of disagreement are noted on the chart. I was personally present for the key portions of any procedures. I have documented in the chart those procedures where I was not present during the key portions. I have reviewed the emergency nurses triage note. I agree with the chief complaint, past medical history, past surgical history, allergies, medications, social and family history as documented unless otherwise noted below. For Physician Assistant/ Nurse Practitioner cases/documentation I have personally evaluated this patient and have completed at least one if not all key elements of the E/M (history, physical exam, and MDM). Additional findings are as noted. Patient with recent admission for urinary retention presents with increased weakness and fatigue. She said she also has not been having any drainage of urine from her Medina catheter today. She denies any abdominal pain or discomfort. She says she has been eating and drinking well but she does appear to be dehydrated with dry mucous membranes. She denies fever, chest pain, shortness of breath. On exam, patient is lying in the bed and appears ill. She does answer questions appropriately as well as bilateral abdomen soft nontender. We will check labs. We will check a bladder scan. We will most likely admit patient.     EKG Interpretation    Interpreted by emergency department physician    Rhythm: sinus tachycardia  Rate: 110-120  Axis: normal  Ectopy: none  Conduction: normal  ST Segments: normal  T Waves: non specific changes  Q Waves: none    Clinical Impression: non-specific EKG and sinus tachycardia    MD Micheal Tomlin MD  Attending Emergency

## 2023-04-13 NOTE — ED NOTES
[facility] scheduled pt transport via insurance.  Confirmation #: [id number]     JOSELO Porter  04/13/23 0309

## 2023-04-13 NOTE — ED PROVIDER NOTES
[address] ED  Emergency Department Encounter  Emergency Medicine Resident     Pt Fareed Coon Telma Nguyen  MRN: [id number]  [place] 1965  Date of evaluation: 23  [facility]       Chief Complaint   Patient presents with    Fatigue     Dc from hospital on 4/10/23       HISTORY OF PRESENT ILLNESS  (Location/Symptom, Timing/Onset, Context/Setting, Quality, Duration, Modifying Factors, Severity.)      Sami Everett is a 61 y.o. female who presents with with complaint of feeling fatigue. She was just discharged from the hospital on 4/10. She was found to have an GUERO, she had been going back and forth with the urologist regarding intermittent self-catheterization versus permanent cath for years apparently. She finally agreed to getting continuous indwelling Medina catheter placement. She is noted that over the last few hours the decreased urine output. She also notes that she has not been eating or drinking since she got home. She denies any abdominal pain or chest pain. She denies any injury or trauma. Past medical history for chronic back pain, hypertension, anxiety and mitral valve prolapse. PAST MEDICAL / SURGICAL / SOCIAL / FAMILY HISTORY      has a past medical history of Anxiety, Chronic back pain, Hypertension, and Mitral valve prolapse.       has a past surgical history that includes  section; Hysterectomy (2008); and Tonsillectomy. Social History     Socioeconomic History    Marital status:       Spouse name: Not on file    Number of children: 3    Years of education: Not on file    Highest education level: Not on file   Occupational History    Not on file   Tobacco Use    Smoking status: Never    Smokeless tobacco: Not on file   Substance and Sexual Activity    Alcohol use: No    Drug use: No    Sexual activity: Not Currently     Partners: Male   Other Topics Concern    Not on file   Social History Narrative    Not on

## 2023-04-13 NOTE — ED NOTES
Pt presents to ER with c/o of increased weakness and fatigue. She said she also has not been having any drainage of urine from her Medina catheter today. She denies any abdominal pain or discomfort. She has been drinking well, but presenting dry mucous membranes. Denies leakage thru her catheter. Pt is Ox4&A and appears ill. Continue with plan of care.      CYNTHIA Elizondo  04/13/23 3015

## 2023-04-16 ENCOUNTER — APPOINTMENT (OUTPATIENT)
Dept: MRI IMAGING | Age: 58
End: 2023-04-16
Payer: COMMERCIAL

## 2023-04-16 ENCOUNTER — APPOINTMENT (OUTPATIENT)
Dept: GENERAL RADIOLOGY | Age: 58
End: 2023-04-16
Payer: COMMERCIAL

## 2023-04-16 ENCOUNTER — APPOINTMENT (OUTPATIENT)
Dept: CT IMAGING | Age: 58
End: 2023-04-16
Payer: COMMERCIAL

## 2023-04-16 ENCOUNTER — HOSPITAL ENCOUNTER (INPATIENT)
Age: 58
LOS: 4 days | Discharge: HOME | End: 2023-04-20
Attending: EMERGENCY MEDICINE
Payer: COMMERCIAL

## 2023-04-16 DIAGNOSIS — I60.9 SAH (SUBARACHNOID HEMORRHAGE) (HCC): ICD-10-CM

## 2023-04-16 DIAGNOSIS — I63.9 ISCHEMIC STROKE OF FRONTAL LOBE (HCC): Primary | ICD-10-CM

## 2023-04-16 DIAGNOSIS — F41.9 ANXIETY: ICD-10-CM

## 2023-04-16 PROBLEM — N10 ACUTE PYELONEPHRITIS: Status: RESOLVED | Noted: 2023-03-24 | Resolved: 2023-04-16

## 2023-04-16 PROBLEM — G93.41 ACUTE METABOLIC ENCEPHALOPATHY: Status: RESOLVED | Noted: 2023-03-24 | Resolved: 2023-04-16

## 2023-04-16 PROBLEM — E87.20 LACTIC ACID ACIDOSIS: Status: RESOLVED | Noted: 2023-03-24 | Resolved: 2023-04-16

## 2023-04-16 PROBLEM — E87.6 HYPOKALEMIA: Status: RESOLVED | Noted: 2023-03-28 | Resolved: 2023-04-16

## 2023-04-16 PROBLEM — R79.89 PSEUDOHYPONATREMIA: Status: RESOLVED | Noted: 2023-02-18 | Resolved: 2023-04-16

## 2023-04-16 PROBLEM — E83.51 HYPOCALCEMIA: Status: RESOLVED | Noted: 2023-03-24 | Resolved: 2023-04-16

## 2023-04-16 PROBLEM — I61.9 BRAIN BLEED (HCC): Status: RESOLVED | Noted: 2023-04-16 | Resolved: 2023-04-16

## 2023-04-16 PROBLEM — D64.9 ACUTE ANEMIA: Status: RESOLVED | Noted: 2023-03-24 | Resolved: 2023-04-16

## 2023-04-16 PROBLEM — E87.29 HIGH ANION GAP METABOLIC ACIDOSIS: Status: RESOLVED | Noted: 2023-03-24 | Resolved: 2023-04-16

## 2023-04-16 PROBLEM — E87.1 HYPONATREMIA: Status: RESOLVED | Noted: 2023-03-24 | Resolved: 2023-04-16

## 2023-04-16 PROBLEM — I61.9 BRAIN BLEED (HCC): Status: ACTIVE | Noted: 2023-04-16

## 2023-04-16 PROBLEM — E11.00 TYPE 2 DIABETES MELLITUS WITH HYPEROSMOLAR NONKETOTIC HYPERGLYCEMIA (HCC): Status: RESOLVED | Noted: 2023-03-24 | Resolved: 2023-04-16

## 2023-04-16 PROBLEM — I67.2 INTRACRANIAL ATHEROSCLEROSIS: Status: ACTIVE | Noted: 2023-04-16

## 2023-04-16 PROBLEM — A41.9 SEPSIS (HCC): Status: RESOLVED | Noted: 2023-03-23 | Resolved: 2023-04-16

## 2023-04-16 PROBLEM — A41.4 SEPTICEMIA DUE TO KLEBSIELLA PNEUMONIAE (HCC): Status: RESOLVED | Noted: 2023-03-24 | Resolved: 2023-04-16

## 2023-04-16 LAB
ABSOLUTE EOS #: 0.19 K/UL (ref 0–0.44)
ABSOLUTE IMMATURE GRANULOCYTE: 0.04 K/UL (ref 0–0.3)
ABSOLUTE LYMPH #: 3.2 K/UL (ref 1.1–3.7)
ABSOLUTE MONO #: 0.64 K/UL (ref 0.1–1.2)
ANION GAP SERPL CALCULATED.3IONS-SCNC: 14 MMOL/L (ref 9–17)
ANION GAP: 16 MMOL/L (ref 7–16)
BASOPHILS # BLD: 1 % (ref 0–2)
BASOPHILS ABSOLUTE: 0.06 K/UL (ref 0–0.2)
BILIRUBIN URINE: NEGATIVE
BUN SERPL-MCNC: 5 MG/DL (ref 6–20)
CALCIUM SERPL-MCNC: 9.6 MG/DL (ref 8.6–10.4)
CASTS UA: ABNORMAL /LPF (ref 0–2)
CASTS UA: ABNORMAL /LPF (ref 0–2)
CHLORIDE SERPL-SCNC: 105 MMOL/L (ref 98–107)
CHOLEST SERPL-MCNC: 170 MG/DL
CHOLESTEROL/HDL RATIO: 4.7
CO2 SERPL-SCNC: 21 MMOL/L (ref 20–31)
COLOR: YELLOW
CREAT SERPL-MCNC: 0.76 MG/DL (ref 0.5–0.9)
CRYSTALS, UA: ABNORMAL /HPF
CRYSTALS, UA: ABNORMAL /HPF
EGFR, POC: >60 ML/MIN/1.73M2
EOSINOPHILS RELATIVE PERCENT: 2 % (ref 1–4)
EPITHELIAL CELLS UA: ABNORMAL /HPF (ref 0–5)
GFR SERPL CREATININE-BSD FRML MDRD: >60 ML/MIN/1.73M2
GLUCOSE BLD-MCNC: 106 MG/DL (ref 65–105)
GLUCOSE BLD-MCNC: 171 MG/DL (ref 65–105)
GLUCOSE BLD-MCNC: 173 MG/DL (ref 74–100)
GLUCOSE BLD-MCNC: 85 MG/DL (ref 65–105)
GLUCOSE SERPL-MCNC: 166 MG/DL (ref 70–99)
GLUCOSE UR STRIP.AUTO-MCNC: NEGATIVE MG/DL
HCO3 VENOUS: 24.2 MMOL/L (ref 22–29)
HCT VFR BLD AUTO: 33 % (ref 36.3–47.1)
HDLC SERPL-MCNC: 36 MG/DL
HGB BLD-MCNC: 10.2 G/DL (ref 11.9–15.1)
IMMATURE GRANULOCYTES: 0 %
KETONES UR STRIP.AUTO-MCNC: NEGATIVE MG/DL
LACTIC ACID, WHOLE BLOOD: 4.7 MMOL/L (ref 0.7–2.1)
LACTIC ACID, WHOLE BLOOD: 4.8 MMOL/L (ref 0.7–2.1)
LACTIC ACID, WHOLE BLOOD: 5.5 MMOL/L (ref 0.7–2.1)
LDLC SERPL CALC-MCNC: 104 MG/DL (ref 0–130)
LEUKOCYTE ESTERASE UR QL STRIP.AUTO: NEGATIVE
LYMPHOCYTES # BLD: 33 % (ref 24–43)
MCH RBC QN AUTO: 27.4 PG (ref 25.2–33.5)
MCHC RBC AUTO-ENTMCNC: 30.9 G/DL (ref 28.4–34.8)
MCV RBC AUTO: 88.7 FL (ref 82.6–102.9)
MONOCYTES # BLD: 7 % (ref 3–12)
MUCUS: ABNORMAL
NEGATIVE BASE EXCESS, VEN: 1 (ref 0–2)
NITRITE UR QL STRIP.AUTO: NEGATIVE
NRBC AUTOMATED: 0 PER 100 WBC
O2 SAT, VEN: 46 % (ref 60–85)
PCO2, VEN: 42.9 MM HG (ref 41–51)
PDW BLD-RTO: 17.3 % (ref 11.8–14.4)
PH VENOUS: 7.36 (ref 7.32–7.43)
PLATELET # BLD AUTO: 420 K/UL (ref 138–453)
PMV BLD AUTO: 9.4 FL (ref 8.1–13.5)
PO2, VEN: 26.5 MM HG (ref 30–50)
POC BUN: 5 MG/DL (ref 8–26)
POC CHLORIDE: 106 MMOL/L (ref 98–107)
POC CREATININE: 0.75 MG/DL (ref 0.51–1.19)
POC HEMATOCRIT: 35 % (ref 36–46)
POC HEMOGLOBIN: 11.8 G/DL (ref 12–16)
POC IONIZED CALCIUM: 1.31 MMOL/L (ref 1.15–1.33)
POC LACTIC ACID: 4.87 MMOL/L (ref 0.56–1.39)
POC POTASSIUM: 3.9 MMOL/L (ref 3.5–4.5)
POC SODIUM: 144 MMOL/L (ref 138–146)
POC TCO2: 24 MMOL/L (ref 22–30)
POTASSIUM SERPL-SCNC: 3.9 MMOL/L (ref 3.7–5.3)
PROT UR STRIP.AUTO-MCNC: 5.5 MG/DL (ref 5–8)
PROT UR STRIP.AUTO-MCNC: ABNORMAL MG/DL
RBC # BLD: 3.72 M/UL (ref 3.95–5.11)
RBC # BLD: ABNORMAL 10*6/UL
RBC CLUMPS #/AREA URNS AUTO: ABNORMAL /HPF (ref 0–2)
REASON FOR REJECTION: NORMAL
SEG NEUTROPHILS: 57 % (ref 36–65)
SEGMENTED NEUTROPHILS ABSOLUTE COUNT: 5.69 K/UL (ref 1.5–8.1)
SODIUM SERPL-SCNC: 140 MMOL/L (ref 135–144)
SPECIFIC GRAVITY UA: 1.05 (ref 1–1.03)
T4 FREE SERPL-MCNC: 1.2 NG/DL (ref 0.9–1.7)
TRIGL SERPL-MCNC: 149 MG/DL
TROPONIN I SERPL DL<=0.01 NG/ML-MCNC: 13 NG/L (ref 0–14)
TSH SERPL-ACNC: 0.23 UIU/ML (ref 0.3–5)
TURBIDITY: CLEAR
URINE HGB: ABNORMAL
UROBILINOGEN, URINE: NORMAL
WBC # BLD AUTO: 9.8 K/UL (ref 3.5–11.3)
WBC UA: ABNORMAL /HPF (ref 0–5)
ZZ NTE CLEAN UP: ORDERED TEST: NORMAL
ZZ NTE WITH NAME CLEAN UP: SPECIMEN SOURCE: NORMAL

## 2023-04-16 PROCEDURE — 71045 X-RAY EXAM CHEST 1 VIEW: CPT

## 2023-04-16 PROCEDURE — 2580000003 HC RX 258: Performed by: EMERGENCY MEDICINE

## 2023-04-16 PROCEDURE — 84484 ASSAY OF TROPONIN QUANT: CPT

## 2023-04-16 PROCEDURE — 2580000003 HC RX 258: Performed by: STUDENT IN AN ORGANIZED HEALTH CARE EDUCATION/TRAINING PROGRAM

## 2023-04-16 PROCEDURE — 85025 COMPLETE CBC W/AUTO DIFF WBC: CPT

## 2023-04-16 PROCEDURE — 2500000003 HC RX 250 WO HCPCS: Performed by: STUDENT IN AN ORGANIZED HEALTH CARE EDUCATION/TRAINING PROGRAM

## 2023-04-16 PROCEDURE — 84439 ASSAY OF FREE THYROXINE: CPT

## 2023-04-16 PROCEDURE — 6370000000 HC RX 637 (ALT 250 FOR IP): Performed by: STUDENT IN AN ORGANIZED HEALTH CARE EDUCATION/TRAINING PROGRAM

## 2023-04-16 PROCEDURE — 6360000002 HC RX W HCPCS: Performed by: STUDENT IN AN ORGANIZED HEALTH CARE EDUCATION/TRAINING PROGRAM

## 2023-04-16 PROCEDURE — 99291 CRITICAL CARE FIRST HOUR: CPT | Performed by: STUDENT IN AN ORGANIZED HEALTH CARE EDUCATION/TRAINING PROGRAM

## 2023-04-16 PROCEDURE — 6360000004 HC RX CONTRAST MEDICATION: Performed by: EMERGENCY MEDICINE

## 2023-04-16 PROCEDURE — 82947 ASSAY GLUCOSE BLOOD QUANT: CPT

## 2023-04-16 PROCEDURE — 84520 ASSAY OF UREA NITROGEN: CPT

## 2023-04-16 PROCEDURE — 70551 MRI BRAIN STEM W/O DYE: CPT

## 2023-04-16 PROCEDURE — 84443 ASSAY THYROID STIM HORMONE: CPT

## 2023-04-16 PROCEDURE — 80061 LIPID PANEL: CPT

## 2023-04-16 PROCEDURE — 80051 ELECTROLYTE PANEL: CPT

## 2023-04-16 PROCEDURE — 82330 ASSAY OF CALCIUM: CPT

## 2023-04-16 PROCEDURE — 6360000002 HC RX W HCPCS

## 2023-04-16 PROCEDURE — 85014 HEMATOCRIT: CPT

## 2023-04-16 PROCEDURE — 70450 CT HEAD/BRAIN W/O DYE: CPT

## 2023-04-16 PROCEDURE — 83036 HEMOGLOBIN GLYCOSYLATED A1C: CPT

## 2023-04-16 PROCEDURE — 81001 URINALYSIS AUTO W/SCOPE: CPT

## 2023-04-16 PROCEDURE — 82803 BLOOD GASES ANY COMBINATION: CPT

## 2023-04-16 PROCEDURE — 2000000000 HC ICU R&B

## 2023-04-16 PROCEDURE — 93005 ELECTROCARDIOGRAM TRACING: CPT | Performed by: EMERGENCY MEDICINE

## 2023-04-16 PROCEDURE — 70498 CT ANGIOGRAPHY NECK: CPT

## 2023-04-16 PROCEDURE — 82565 ASSAY OF CREATININE: CPT

## 2023-04-16 PROCEDURE — 83605 ASSAY OF LACTIC ACID: CPT

## 2023-04-16 PROCEDURE — 87040 BLOOD CULTURE FOR BACTERIA: CPT

## 2023-04-16 PROCEDURE — 6370000000 HC RX 637 (ALT 250 FOR IP)

## 2023-04-16 PROCEDURE — 94761 N-INVAS EAR/PLS OXIMETRY MLT: CPT

## 2023-04-16 PROCEDURE — 80048 BASIC METABOLIC PNL TOTAL CA: CPT

## 2023-04-16 PROCEDURE — 99285 EMERGENCY DEPT VISIT HI MDM: CPT

## 2023-04-16 PROCEDURE — 2580000003 HC RX 258

## 2023-04-16 RX ORDER — HYDRALAZINE HYDROCHLORIDE 20 MG/ML
10 INJECTION INTRAMUSCULAR; INTRAVENOUS EVERY 4 HOURS PRN
Status: DISCONTINUED | OUTPATIENT
Start: 2023-04-16 | End: 2023-04-20 | Stop reason: HOSPADM

## 2023-04-16 RX ORDER — SODIUM CHLORIDE 9 MG/ML
INJECTION, SOLUTION INTRAVENOUS CONTINUOUS
Status: DISCONTINUED | OUTPATIENT
Start: 2023-04-16 | End: 2023-04-17

## 2023-04-16 RX ORDER — DEXTROSE MONOHYDRATE 100 MG/ML
INJECTION, SOLUTION INTRAVENOUS CONTINUOUS PRN
Status: DISCONTINUED | OUTPATIENT
Start: 2023-04-16 | End: 2023-04-20 | Stop reason: HOSPADM

## 2023-04-16 RX ORDER — 0.9 % SODIUM CHLORIDE 0.9 %
500 INTRAVENOUS SOLUTION INTRAVENOUS ONCE
Status: COMPLETED | OUTPATIENT
Start: 2023-04-16 | End: 2023-04-17

## 2023-04-16 RX ORDER — CLOPIDOGREL 300 MG/1
300 TABLET, FILM COATED ORAL ONCE
Status: COMPLETED | OUTPATIENT
Start: 2023-04-16 | End: 2023-04-16

## 2023-04-16 RX ORDER — SODIUM CHLORIDE 9 MG/ML
1000 INJECTION, SOLUTION INTRAVENOUS CONTINUOUS
Status: ACTIVE | OUTPATIENT
Start: 2023-04-16 | End: 2023-04-16

## 2023-04-16 RX ORDER — ACETAMINOPHEN 325 MG/1
650 TABLET ORAL EVERY 4 HOURS PRN
Status: DISCONTINUED | OUTPATIENT
Start: 2023-04-16 | End: 2023-04-20 | Stop reason: HOSPADM

## 2023-04-16 RX ORDER — FENTANYL CITRATE 50 UG/ML
25 INJECTION, SOLUTION INTRAMUSCULAR; INTRAVENOUS ONCE
Status: COMPLETED | OUTPATIENT
Start: 2023-04-16 | End: 2023-04-16

## 2023-04-16 RX ORDER — ONDANSETRON 2 MG/ML
INJECTION INTRAMUSCULAR; INTRAVENOUS
Status: COMPLETED
Start: 2023-04-16 | End: 2023-04-16

## 2023-04-16 RX ORDER — MAGNESIUM SULFATE 1 G/100ML
1000 INJECTION INTRAVENOUS PRN
Status: DISCONTINUED | OUTPATIENT
Start: 2023-04-16 | End: 2023-04-20 | Stop reason: HOSPADM

## 2023-04-16 RX ORDER — CLOPIDOGREL BISULFATE 75 MG/1
75 TABLET ORAL DAILY
Status: DISCONTINUED | OUTPATIENT
Start: 2023-04-17 | End: 2023-04-17

## 2023-04-16 RX ORDER — SODIUM CHLORIDE 0.9 % (FLUSH) 0.9 %
3 SYRINGE (ML) INJECTION EVERY 8 HOURS
Status: DISCONTINUED | OUTPATIENT
Start: 2023-04-16 | End: 2023-04-20 | Stop reason: HOSPADM

## 2023-04-16 RX ORDER — ONDANSETRON 4 MG/1
4 TABLET, ORALLY DISINTEGRATING ORAL EVERY 8 HOURS PRN
Status: DISCONTINUED | OUTPATIENT
Start: 2023-04-16 | End: 2023-04-20 | Stop reason: HOSPADM

## 2023-04-16 RX ORDER — HYDRALAZINE HYDROCHLORIDE 20 MG/ML
10 INJECTION INTRAMUSCULAR; INTRAVENOUS EVERY 6 HOURS PRN
Status: DISCONTINUED | OUTPATIENT
Start: 2023-04-16 | End: 2023-04-16

## 2023-04-16 RX ORDER — ONDANSETRON 2 MG/ML
4 INJECTION INTRAMUSCULAR; INTRAVENOUS EVERY 6 HOURS PRN
Status: DISCONTINUED | OUTPATIENT
Start: 2023-04-16 | End: 2023-04-20 | Stop reason: HOSPADM

## 2023-04-16 RX ORDER — OXYCODONE HYDROCHLORIDE 5 MG/1
5 TABLET ORAL EVERY 6 HOURS PRN
Status: DISCONTINUED | OUTPATIENT
Start: 2023-04-16 | End: 2023-04-20 | Stop reason: HOSPADM

## 2023-04-16 RX ORDER — LABETALOL HYDROCHLORIDE 5 MG/ML
10 INJECTION, SOLUTION INTRAVENOUS EVERY 6 HOURS PRN
Status: DISCONTINUED | OUTPATIENT
Start: 2023-04-16 | End: 2023-04-16

## 2023-04-16 RX ORDER — LABETALOL HYDROCHLORIDE 5 MG/ML
10 INJECTION, SOLUTION INTRAVENOUS
Status: DISCONTINUED | OUTPATIENT
Start: 2023-04-16 | End: 2023-04-20 | Stop reason: HOSPADM

## 2023-04-16 RX ORDER — INSULIN LISPRO 100 [IU]/ML
0-16 INJECTION, SOLUTION INTRAVENOUS; SUBCUTANEOUS
Status: DISCONTINUED | OUTPATIENT
Start: 2023-04-16 | End: 2023-04-17

## 2023-04-16 RX ORDER — INSULIN LISPRO 100 [IU]/ML
0-4 INJECTION, SOLUTION INTRAVENOUS; SUBCUTANEOUS NIGHTLY
Status: DISCONTINUED | OUTPATIENT
Start: 2023-04-16 | End: 2023-04-17

## 2023-04-16 RX ADMIN — SODIUM CHLORIDE 500 ML: 9 INJECTION, SOLUTION INTRAVENOUS at 23:50

## 2023-04-16 RX ADMIN — SODIUM CHLORIDE: 9 INJECTION, SOLUTION INTRAVENOUS at 18:08

## 2023-04-16 RX ADMIN — IOPAMIDOL 90 ML: 755 INJECTION, SOLUTION INTRAVENOUS at 13:27

## 2023-04-16 RX ADMIN — SODIUM CHLORIDE, PRESERVATIVE FREE 3 ML: 5 INJECTION INTRAVENOUS at 13:41

## 2023-04-16 RX ADMIN — ONDANSETRON 4 MG: 2 INJECTION INTRAMUSCULAR; INTRAVENOUS at 16:51

## 2023-04-16 RX ADMIN — ACETAMINOPHEN 650 MG: 325 TABLET ORAL at 20:31

## 2023-04-16 RX ADMIN — SODIUM CHLORIDE, PRESERVATIVE FREE 3 ML: 5 INJECTION INTRAVENOUS at 21:12

## 2023-04-16 RX ADMIN — CLOPIDOGREL BISULFATE 300 MG: 300 TABLET, FILM COATED ORAL at 14:27

## 2023-04-16 RX ADMIN — FENTANYL CITRATE 25 MCG: 50 INJECTION, SOLUTION INTRAMUSCULAR; INTRAVENOUS at 16:43

## 2023-04-16 RX ADMIN — SODIUM CHLORIDE 1000 ML: 9 INJECTION, SOLUTION INTRAVENOUS at 14:41

## 2023-04-16 RX ADMIN — SODIUM CHLORIDE: 9 INJECTION, SOLUTION INTRAVENOUS at 21:15

## 2023-04-16 RX ADMIN — LABETALOL HYDROCHLORIDE 10 MG: 5 INJECTION, SOLUTION INTRAVENOUS at 21:10

## 2023-04-16 RX ADMIN — OXYCODONE 5 MG: 5 TABLET ORAL at 22:17

## 2023-04-16 RX ADMIN — HYDRALAZINE HYDROCHLORIDE 10 MG: 20 INJECTION INTRAMUSCULAR; INTRAVENOUS at 22:24

## 2023-04-16 ASSESSMENT — ENCOUNTER SYMPTOMS
EYES NEGATIVE: 1
ABDOMINAL DISTENTION: 0
COUGH: 0
SHORTNESS OF BREATH: 0
FACIAL SWELLING: 0
TROUBLE SWALLOWING: 0
VOICE CHANGE: 0
DIARRHEA: 0
APNEA: 0

## 2023-04-16 ASSESSMENT — PAIN DESCRIPTION - LOCATION
LOCATION: BACK
LOCATION: BACK

## 2023-04-16 ASSESSMENT — PAIN SCALES - GENERAL
PAINLEVEL_OUTOF10: 6
PAINLEVEL_OUTOF10: 8

## 2023-04-17 ENCOUNTER — APPOINTMENT (OUTPATIENT)
Dept: CT IMAGING | Age: 58
End: 2023-04-17
Payer: COMMERCIAL

## 2023-04-17 LAB
ANION GAP SERPL CALCULATED.3IONS-SCNC: 10 MMOL/L (ref 9–17)
BACTERIA: ABNORMAL
BILIRUBIN URINE: NEGATIVE
BUN SERPL-MCNC: 5 MG/DL (ref 6–20)
CALCIUM SERPL-MCNC: 8.6 MG/DL (ref 8.6–10.4)
CASTS UA: ABNORMAL /LPF (ref 0–2)
CASTS UA: ABNORMAL /LPF (ref 0–2)
CHLORIDE SERPL-SCNC: 107 MMOL/L (ref 98–107)
CO2 SERPL-SCNC: 18 MMOL/L (ref 20–31)
COLOR: YELLOW
CREAT SERPL-MCNC: 0.62 MG/DL (ref 0.5–0.9)
EPITHELIAL CELLS UA: ABNORMAL /HPF (ref 0–5)
EST. AVERAGE GLUCOSE BLD GHB EST-MCNC: 197 MG/DL
EST. AVERAGE GLUCOSE BLD GHB EST-MCNC: 200 MG/DL
GFR SERPL CREATININE-BSD FRML MDRD: >60 ML/MIN/1.73M2
GLUCOSE BLD-MCNC: 113 MG/DL (ref 65–105)
GLUCOSE BLD-MCNC: 119 MG/DL (ref 65–105)
GLUCOSE BLD-MCNC: 144 MG/DL (ref 65–105)
GLUCOSE BLD-MCNC: 165 MG/DL (ref 65–105)
GLUCOSE SERPL-MCNC: 113 MG/DL (ref 70–99)
GLUCOSE UR STRIP.AUTO-MCNC: NEGATIVE MG/DL
HBA1C MFR BLD: 8.5 % (ref 4–6)
HBA1C MFR BLD: 8.6 % (ref 4–6)
HCT VFR BLD AUTO: 30.6 % (ref 36.3–47.1)
HCT VFR BLD AUTO: 32.3 % (ref 36.3–47.1)
HGB BLD-MCNC: 8.8 G/DL (ref 11.9–15.1)
HGB BLD-MCNC: 9.9 G/DL (ref 11.9–15.1)
KETONES UR STRIP.AUTO-MCNC: NEGATIVE MG/DL
LACTIC ACID, WHOLE BLOOD: 1.8 MMOL/L (ref 0.7–2.1)
LACTIC ACID, WHOLE BLOOD: 3.1 MMOL/L (ref 0.7–2.1)
LACTIC ACID, WHOLE BLOOD: 3.3 MMOL/L (ref 0.7–2.1)
LACTIC ACID, WHOLE BLOOD: 3.7 MMOL/L (ref 0.7–2.1)
LEUKOCYTE ESTERASE UR QL STRIP.AUTO: ABNORMAL
MAGNESIUM SERPL-MCNC: 1.4 MG/DL (ref 1.6–2.6)
MCH RBC QN AUTO: 27.4 PG (ref 25.2–33.5)
MCHC RBC AUTO-ENTMCNC: 28.8 G/DL (ref 28.4–34.8)
MCV RBC AUTO: 95.3 FL (ref 82.6–102.9)
NITRITE UR QL STRIP.AUTO: NEGATIVE
NRBC AUTOMATED: 0 PER 100 WBC
PDW BLD-RTO: 17.2 % (ref 11.8–14.4)
PLATELET # BLD AUTO: 347 K/UL (ref 138–453)
PMV BLD AUTO: 9.1 FL (ref 8.1–13.5)
POTASSIUM SERPL-SCNC: 3.5 MMOL/L (ref 3.7–5.3)
PROT UR STRIP.AUTO-MCNC: 6.5 MG/DL (ref 5–8)
PROT UR STRIP.AUTO-MCNC: NEGATIVE MG/DL
RBC # BLD: 3.21 M/UL (ref 3.95–5.11)
RBC CLUMPS #/AREA URNS AUTO: ABNORMAL /HPF (ref 0–2)
SODIUM SERPL-SCNC: 135 MMOL/L (ref 135–144)
SPECIFIC GRAVITY UA: 1.04 (ref 1–1.03)
TURBIDITY: CLEAR
URINE HGB: ABNORMAL
UROBILINOGEN, URINE: NORMAL
WBC # BLD AUTO: 13.1 K/UL (ref 3.5–11.3)
WBC UA: ABNORMAL /HPF (ref 0–5)

## 2023-04-17 PROCEDURE — 6360000004 HC RX CONTRAST MEDICATION: Performed by: STUDENT IN AN ORGANIZED HEALTH CARE EDUCATION/TRAINING PROGRAM

## 2023-04-17 PROCEDURE — 99232 SBSQ HOSP IP/OBS MODERATE 35: CPT | Performed by: PSYCHIATRY & NEUROLOGY

## 2023-04-17 PROCEDURE — 6360000002 HC RX W HCPCS: Performed by: NURSE PRACTITIONER

## 2023-04-17 PROCEDURE — 2000000000 HC ICU R&B

## 2023-04-17 PROCEDURE — 83605 ASSAY OF LACTIC ACID: CPT

## 2023-04-17 PROCEDURE — 2580000003 HC RX 258: Performed by: EMERGENCY MEDICINE

## 2023-04-17 PROCEDURE — 70450 CT HEAD/BRAIN W/O DYE: CPT

## 2023-04-17 PROCEDURE — 6370000000 HC RX 637 (ALT 250 FOR IP): Performed by: NURSE PRACTITIONER

## 2023-04-17 PROCEDURE — 6370000000 HC RX 637 (ALT 250 FOR IP)

## 2023-04-17 PROCEDURE — 81001 URINALYSIS AUTO W/SCOPE: CPT

## 2023-04-17 PROCEDURE — 82947 ASSAY GLUCOSE BLOOD QUANT: CPT

## 2023-04-17 PROCEDURE — 2580000003 HC RX 258: Performed by: NURSE PRACTITIONER

## 2023-04-17 PROCEDURE — 92523 SPEECH SOUND LANG COMPREHEN: CPT

## 2023-04-17 PROCEDURE — 83735 ASSAY OF MAGNESIUM: CPT

## 2023-04-17 PROCEDURE — 80048 BASIC METABOLIC PNL TOTAL CA: CPT

## 2023-04-17 PROCEDURE — 6370000000 HC RX 637 (ALT 250 FOR IP): Performed by: STUDENT IN AN ORGANIZED HEALTH CARE EDUCATION/TRAINING PROGRAM

## 2023-04-17 PROCEDURE — 6360000002 HC RX W HCPCS

## 2023-04-17 PROCEDURE — 85018 HEMOGLOBIN: CPT

## 2023-04-17 PROCEDURE — 83036 HEMOGLOBIN GLYCOSYLATED A1C: CPT

## 2023-04-17 PROCEDURE — 51702 INSERT TEMP BLADDER CATH: CPT

## 2023-04-17 PROCEDURE — 36415 COLL VENOUS BLD VENIPUNCTURE: CPT

## 2023-04-17 PROCEDURE — 74177 CT ABD & PELVIS W/CONTRAST: CPT

## 2023-04-17 PROCEDURE — 2500000003 HC RX 250 WO HCPCS

## 2023-04-17 PROCEDURE — 85014 HEMATOCRIT: CPT

## 2023-04-17 PROCEDURE — 87086 URINE CULTURE/COLONY COUNT: CPT

## 2023-04-17 PROCEDURE — 85027 COMPLETE CBC AUTOMATED: CPT

## 2023-04-17 RX ORDER — INSULIN LISPRO 100 [IU]/ML
0-4 INJECTION, SOLUTION INTRAVENOUS; SUBCUTANEOUS NIGHTLY
Status: DISCONTINUED | OUTPATIENT
Start: 2023-04-17 | End: 2023-04-20 | Stop reason: HOSPADM

## 2023-04-17 RX ORDER — INSULIN LISPRO 100 [IU]/ML
0-4 INJECTION, SOLUTION INTRAVENOUS; SUBCUTANEOUS
Status: DISCONTINUED | OUTPATIENT
Start: 2023-04-17 | End: 2023-04-20 | Stop reason: HOSPADM

## 2023-04-17 RX ORDER — QUETIAPINE FUMARATE 200 MG/1
200 TABLET, FILM COATED ORAL NIGHTLY
Status: DISCONTINUED | OUTPATIENT
Start: 2023-04-17 | End: 2023-04-20 | Stop reason: HOSPADM

## 2023-04-17 RX ORDER — MAGNESIUM SULFATE IN WATER 40 MG/ML
2000 INJECTION, SOLUTION INTRAVENOUS ONCE
Status: COMPLETED | OUTPATIENT
Start: 2023-04-17 | End: 2023-04-17

## 2023-04-17 RX ORDER — ATORVASTATIN CALCIUM 40 MG/1
40 TABLET, FILM COATED ORAL NIGHTLY
Status: DISCONTINUED | OUTPATIENT
Start: 2023-04-17 | End: 2023-04-20 | Stop reason: HOSPADM

## 2023-04-17 RX ORDER — ASPIRIN 81 MG/1
81 TABLET, CHEWABLE ORAL DAILY
Status: DISCONTINUED | OUTPATIENT
Start: 2023-04-17 | End: 2023-04-20 | Stop reason: HOSPADM

## 2023-04-17 RX ORDER — CLOPIDOGREL BISULFATE 75 MG/1
75 TABLET ORAL DAILY
Status: DISCONTINUED | OUTPATIENT
Start: 2023-04-17 | End: 2023-04-20 | Stop reason: HOSPADM

## 2023-04-17 RX ORDER — SODIUM CHLORIDE, SODIUM LACTATE, POTASSIUM CHLORIDE, CALCIUM CHLORIDE 600; 310; 30; 20 MG/100ML; MG/100ML; MG/100ML; MG/100ML
INJECTION, SOLUTION INTRAVENOUS CONTINUOUS
Status: DISCONTINUED | OUTPATIENT
Start: 2023-04-17 | End: 2023-04-20 | Stop reason: HOSPADM

## 2023-04-17 RX ORDER — 0.9 % SODIUM CHLORIDE 0.9 %
500 INTRAVENOUS SOLUTION INTRAVENOUS ONCE
Status: COMPLETED | OUTPATIENT
Start: 2023-04-17 | End: 2023-04-17

## 2023-04-17 RX ORDER — POTASSIUM CHLORIDE 7.45 MG/ML
10 INJECTION INTRAVENOUS
Status: COMPLETED | OUTPATIENT
Start: 2023-04-17 | End: 2023-04-17

## 2023-04-17 RX ORDER — ALPRAZOLAM 0.5 MG/1
0.5 TABLET ORAL 4 TIMES DAILY
Status: DISCONTINUED | OUTPATIENT
Start: 2023-04-17 | End: 2023-04-20 | Stop reason: HOSPADM

## 2023-04-17 RX ADMIN — SODIUM CHLORIDE, POTASSIUM CHLORIDE, SODIUM LACTATE AND CALCIUM CHLORIDE: 600; 310; 30; 20 INJECTION, SOLUTION INTRAVENOUS at 10:05

## 2023-04-17 RX ADMIN — CEFTRIAXONE SODIUM 1000 MG: 10 INJECTION, POWDER, FOR SOLUTION INTRAVENOUS at 18:24

## 2023-04-17 RX ADMIN — IOPAMIDOL 75 ML: 755 INJECTION, SOLUTION INTRAVENOUS at 14:40

## 2023-04-17 RX ADMIN — SODIUM CHLORIDE, PRESERVATIVE FREE 3 ML: 5 INJECTION INTRAVENOUS at 05:02

## 2023-04-17 RX ADMIN — ALPRAZOLAM 0.5 MG: 0.5 TABLET ORAL at 12:50

## 2023-04-17 RX ADMIN — SODIUM CHLORIDE, PRESERVATIVE FREE 3 ML: 5 INJECTION INTRAVENOUS at 12:50

## 2023-04-17 RX ADMIN — OXYCODONE 5 MG: 5 TABLET ORAL at 12:55

## 2023-04-17 RX ADMIN — OXYCODONE 5 MG: 5 TABLET ORAL at 06:07

## 2023-04-17 RX ADMIN — SODIUM CHLORIDE 500 ML: 9 INJECTION, SOLUTION INTRAVENOUS at 07:50

## 2023-04-17 RX ADMIN — LABETALOL HYDROCHLORIDE 10 MG: 5 INJECTION, SOLUTION INTRAVENOUS at 07:58

## 2023-04-17 RX ADMIN — POTASSIUM BICARBONATE 20 MEQ: 782 TABLET, EFFERVESCENT ORAL at 08:00

## 2023-04-17 RX ADMIN — ALPRAZOLAM 0.5 MG: 0.5 TABLET ORAL at 09:15

## 2023-04-17 RX ADMIN — ALPRAZOLAM 0.5 MG: 0.5 TABLET ORAL at 16:38

## 2023-04-17 RX ADMIN — QUETIAPINE FUMARATE 200 MG: 200 TABLET ORAL at 20:39

## 2023-04-17 RX ADMIN — MAGNESIUM SULFATE HEPTAHYDRATE 2000 MG: 40 INJECTION, SOLUTION INTRAVENOUS at 05:48

## 2023-04-17 RX ADMIN — LABETALOL HYDROCHLORIDE 10 MG: 5 INJECTION, SOLUTION INTRAVENOUS at 14:16

## 2023-04-17 RX ADMIN — CLOPIDOGREL BISULFATE 75 MG: 75 TABLET, FILM COATED ORAL at 17:12

## 2023-04-17 RX ADMIN — ACETAMINOPHEN 650 MG: 325 TABLET ORAL at 21:00

## 2023-04-17 RX ADMIN — OXYCODONE 5 MG: 5 TABLET ORAL at 20:39

## 2023-04-17 RX ADMIN — POTASSIUM CHLORIDE 10 MEQ: 7.46 INJECTION, SOLUTION INTRAVENOUS at 07:26

## 2023-04-17 RX ADMIN — POTASSIUM CHLORIDE 10 MEQ: 7.46 INJECTION, SOLUTION INTRAVENOUS at 06:16

## 2023-04-17 RX ADMIN — ASPIRIN 81 MG: 81 TABLET, CHEWABLE ORAL at 17:12

## 2023-04-17 RX ADMIN — SODIUM CHLORIDE, PRESERVATIVE FREE 3 ML: 5 INJECTION INTRAVENOUS at 20:40

## 2023-04-17 ASSESSMENT — PAIN SCALES - GENERAL
PAINLEVEL_OUTOF10: 6
PAINLEVEL_OUTOF10: 3

## 2023-04-17 ASSESSMENT — PAIN DESCRIPTION - LOCATION
LOCATION: BACK

## 2023-04-17 ASSESSMENT — PAIN DESCRIPTION - DESCRIPTORS: DESCRIPTORS: ACHING

## 2023-04-17 ASSESSMENT — PAIN DESCRIPTION - ORIENTATION: ORIENTATION: LEFT;RIGHT;LOWER

## 2023-04-17 NOTE — PLAN OF CARE
Problem: Discharge Planning  Goal: Discharge to home or other facility with appropriate resources  4/17/2023 1840 by Ninfa Mckee RN  Outcome: Progressing     Problem: Skin/Tissue Integrity  Goal: Absence of new skin breakdown  Description: 1. Monitor for areas of redness and/or skin breakdown  2. Assess vascular access sites hourly  3. Every 4-6 hours minimum:  Change oxygen saturation probe site  4. Every 4-6 hours:  If on nasal continuous positive airway pressure, respiratory therapy assess nares and determine need for appliance change or resting period. 4/17/2023 1840 by Ninfa Mckee RN  Outcome: Progressing     Problem: Safety - Adult  Goal: Free from fall injury  4/17/2023 1840 by Ninfa Mckee RN  Outcome: Progressing  Patient assessed for fall risk; fall precautions initiated. Environment kept free of clutter and adequate lighting provided. Bed locked and in lowest position.   Call light within reach     Problem: Chronic Conditions and Co-morbidities  Goal: Patient's chronic conditions and co-morbidity symptoms are monitored and maintained or improved  Outcome: Progressing

## 2023-04-18 PROBLEM — D72.829 LEUKOCYTOSIS: Status: ACTIVE | Noted: 2023-04-18

## 2023-04-18 LAB
ANION GAP SERPL CALCULATED.3IONS-SCNC: 8 MMOL/L (ref 9–17)
BUN SERPL-MCNC: 4 MG/DL (ref 6–20)
CALCIUM SERPL-MCNC: 8.8 MG/DL (ref 8.6–10.4)
CHLORIDE SERPL-SCNC: 110 MMOL/L (ref 98–107)
CO2 SERPL-SCNC: 23 MMOL/L (ref 20–31)
CREAT SERPL-MCNC: 0.51 MG/DL (ref 0.5–0.9)
EKG ATRIAL RATE: 118 BPM
EKG P AXIS: 18 DEGREES
EKG P-R INTERVAL: 132 MS
EKG Q-T INTERVAL: 298 MS
EKG QRS DURATION: 74 MS
EKG QTC CALCULATION (BAZETT): 417 MS
EKG R AXIS: -15 DEGREES
EKG T AXIS: 2 DEGREES
EKG VENTRICULAR RATE: 118 BPM
EST. AVERAGE GLUCOSE BLD GHB EST-MCNC: 186 MG/DL
FERRITIN SERPL-MCNC: 354 NG/ML (ref 13–150)
GFR SERPL CREATININE-BSD FRML MDRD: >60 ML/MIN/1.73M2
GLUCOSE BLD-MCNC: 139 MG/DL (ref 65–105)
GLUCOSE BLD-MCNC: 151 MG/DL (ref 65–105)
GLUCOSE BLD-MCNC: 164 MG/DL (ref 65–105)
GLUCOSE BLD-MCNC: 172 MG/DL (ref 65–105)
GLUCOSE SERPL-MCNC: 103 MG/DL (ref 70–99)
HBA1C MFR BLD: 8.1 % (ref 4–6)
HCT VFR BLD AUTO: 27.6 % (ref 36.3–47.1)
HGB BLD-MCNC: 8.5 G/DL (ref 11.9–15.1)
IRON SATURATION: 28 % (ref 20–55)
IRON SERPL-MCNC: 53 UG/DL (ref 37–145)
LACTIC ACID, WHOLE BLOOD: 1.9 MMOL/L (ref 0.7–2.1)
MAGNESIUM SERPL-MCNC: 1.6 MG/DL (ref 1.6–2.6)
MCH RBC QN AUTO: 27.3 PG (ref 25.2–33.5)
MCHC RBC AUTO-ENTMCNC: 30.8 G/DL (ref 28.4–34.8)
MCV RBC AUTO: 88.7 FL (ref 82.6–102.9)
MICROORGANISM SPEC CULT: NO GROWTH
NRBC AUTOMATED: 0 PER 100 WBC
PDW BLD-RTO: 17.6 % (ref 11.8–14.4)
PLATELET # BLD AUTO: 364 K/UL (ref 138–453)
PMV BLD AUTO: 9.2 FL (ref 8.1–13.5)
POTASSIUM SERPL-SCNC: 3.8 MMOL/L (ref 3.7–5.3)
RBC # BLD: 3.11 M/UL (ref 3.95–5.11)
SODIUM SERPL-SCNC: 141 MMOL/L (ref 135–144)
SPECIMEN DESCRIPTION: NORMAL
TIBC SERPL-MCNC: 191 UG/DL (ref 250–450)
UNSATURATED IRON BINDING CAPACITY: 138 UG/DL (ref 112–347)
WBC # BLD AUTO: 7.4 K/UL (ref 3.5–11.3)

## 2023-04-18 PROCEDURE — 2060000000 HC ICU INTERMEDIATE R&B

## 2023-04-18 PROCEDURE — 97535 SELF CARE MNGMENT TRAINING: CPT

## 2023-04-18 PROCEDURE — 83605 ASSAY OF LACTIC ACID: CPT

## 2023-04-18 PROCEDURE — 80048 BASIC METABOLIC PNL TOTAL CA: CPT

## 2023-04-18 PROCEDURE — 36415 COLL VENOUS BLD VENIPUNCTURE: CPT

## 2023-04-18 PROCEDURE — 82947 ASSAY GLUCOSE BLOOD QUANT: CPT

## 2023-04-18 PROCEDURE — 6360000002 HC RX W HCPCS: Performed by: PSYCHIATRY & NEUROLOGY

## 2023-04-18 PROCEDURE — 6360000002 HC RX W HCPCS

## 2023-04-18 PROCEDURE — 83735 ASSAY OF MAGNESIUM: CPT

## 2023-04-18 PROCEDURE — 83540 ASSAY OF IRON: CPT

## 2023-04-18 PROCEDURE — 97166 OT EVAL MOD COMPLEX 45 MIN: CPT

## 2023-04-18 PROCEDURE — 82728 ASSAY OF FERRITIN: CPT

## 2023-04-18 PROCEDURE — 2580000003 HC RX 258: Performed by: EMERGENCY MEDICINE

## 2023-04-18 PROCEDURE — 6370000000 HC RX 637 (ALT 250 FOR IP)

## 2023-04-18 PROCEDURE — 94761 N-INVAS EAR/PLS OXIMETRY MLT: CPT

## 2023-04-18 PROCEDURE — 6370000000 HC RX 637 (ALT 250 FOR IP): Performed by: STUDENT IN AN ORGANIZED HEALTH CARE EDUCATION/TRAINING PROGRAM

## 2023-04-18 PROCEDURE — 97162 PT EVAL MOD COMPLEX 30 MIN: CPT

## 2023-04-18 PROCEDURE — 99231 SBSQ HOSP IP/OBS SF/LOW 25: CPT | Performed by: PSYCHIATRY & NEUROLOGY

## 2023-04-18 PROCEDURE — 2580000003 HC RX 258: Performed by: NURSE PRACTITIONER

## 2023-04-18 PROCEDURE — 2500000003 HC RX 250 WO HCPCS

## 2023-04-18 PROCEDURE — 85027 COMPLETE CBC AUTOMATED: CPT

## 2023-04-18 PROCEDURE — 6370000000 HC RX 637 (ALT 250 FOR IP): Performed by: NURSE PRACTITIONER

## 2023-04-18 PROCEDURE — 97530 THERAPEUTIC ACTIVITIES: CPT

## 2023-04-18 PROCEDURE — 83550 IRON BINDING TEST: CPT

## 2023-04-18 PROCEDURE — 6360000002 HC RX W HCPCS: Performed by: NURSE PRACTITIONER

## 2023-04-18 RX ORDER — ENOXAPARIN SODIUM 100 MG/ML
30 INJECTION SUBCUTANEOUS DAILY
Status: DISCONTINUED | OUTPATIENT
Start: 2023-04-18 | End: 2023-04-20 | Stop reason: HOSPADM

## 2023-04-18 RX ORDER — MAGNESIUM SULFATE IN WATER 40 MG/ML
2000 INJECTION, SOLUTION INTRAVENOUS ONCE
Status: COMPLETED | OUTPATIENT
Start: 2023-04-18 | End: 2023-04-18

## 2023-04-18 RX ORDER — POTASSIUM CHLORIDE 20 MEQ/1
20 TABLET, EXTENDED RELEASE ORAL ONCE
Status: COMPLETED | OUTPATIENT
Start: 2023-04-18 | End: 2023-04-18

## 2023-04-18 RX ORDER — POTASSIUM CHLORIDE 20 MEQ/1
40 TABLET, EXTENDED RELEASE ORAL ONCE
Status: DISCONTINUED | OUTPATIENT
Start: 2023-04-18 | End: 2023-04-18

## 2023-04-18 RX ORDER — ENOXAPARIN SODIUM 100 MG/ML
40 INJECTION SUBCUTANEOUS DAILY
Status: DISCONTINUED | OUTPATIENT
Start: 2023-04-18 | End: 2023-04-18

## 2023-04-18 RX ADMIN — SODIUM CHLORIDE, PRESERVATIVE FREE 3 ML: 5 INJECTION INTRAVENOUS at 06:20

## 2023-04-18 RX ADMIN — POTASSIUM CHLORIDE 20 MEQ: 1500 TABLET, EXTENDED RELEASE ORAL at 08:45

## 2023-04-18 RX ADMIN — MAGNESIUM SULFATE HEPTAHYDRATE 2000 MG: 40 INJECTION, SOLUTION INTRAVENOUS at 08:46

## 2023-04-18 RX ADMIN — QUETIAPINE FUMARATE 200 MG: 200 TABLET ORAL at 20:56

## 2023-04-18 RX ADMIN — ALPRAZOLAM 0.5 MG: 0.5 TABLET ORAL at 14:28

## 2023-04-18 RX ADMIN — ALPRAZOLAM 0.5 MG: 0.5 TABLET ORAL at 10:15

## 2023-04-18 RX ADMIN — ACETAMINOPHEN 650 MG: 325 TABLET ORAL at 14:29

## 2023-04-18 RX ADMIN — ALPRAZOLAM 0.5 MG: 0.5 TABLET ORAL at 18:18

## 2023-04-18 RX ADMIN — ENOXAPARIN SODIUM 30 MG: 100 INJECTION SUBCUTANEOUS at 10:18

## 2023-04-18 RX ADMIN — CEFTRIAXONE SODIUM 1000 MG: 10 INJECTION, POWDER, FOR SOLUTION INTRAVENOUS at 18:18

## 2023-04-18 RX ADMIN — HYDRALAZINE HYDROCHLORIDE 10 MG: 20 INJECTION INTRAMUSCULAR; INTRAVENOUS at 17:38

## 2023-04-18 RX ADMIN — CLOPIDOGREL BISULFATE 75 MG: 75 TABLET, FILM COATED ORAL at 08:36

## 2023-04-18 RX ADMIN — ALPRAZOLAM 0.5 MG: 0.5 TABLET ORAL at 04:30

## 2023-04-18 RX ADMIN — OXYCODONE 5 MG: 5 TABLET ORAL at 04:14

## 2023-04-18 RX ADMIN — DESMOPRESSIN ACETATE 40 MG: 0.2 TABLET ORAL at 20:56

## 2023-04-18 RX ADMIN — LABETALOL HYDROCHLORIDE 10 MG: 5 INJECTION, SOLUTION INTRAVENOUS at 16:11

## 2023-04-18 RX ADMIN — OXYCODONE 5 MG: 5 TABLET ORAL at 18:50

## 2023-04-18 RX ADMIN — ALPRAZOLAM 0.5 MG: 0.5 TABLET ORAL at 20:56

## 2023-04-18 RX ADMIN — OXYCODONE 5 MG: 5 TABLET ORAL at 11:00

## 2023-04-18 RX ADMIN — LABETALOL HYDROCHLORIDE 10 MG: 5 INJECTION, SOLUTION INTRAVENOUS at 11:00

## 2023-04-18 RX ADMIN — SODIUM CHLORIDE, PRESERVATIVE FREE 3 ML: 5 INJECTION INTRAVENOUS at 13:30

## 2023-04-18 RX ADMIN — ASPIRIN 81 MG: 81 TABLET, CHEWABLE ORAL at 08:36

## 2023-04-18 ASSESSMENT — PAIN DESCRIPTION - ORIENTATION
ORIENTATION: RIGHT;LEFT
ORIENTATION: RIGHT;LEFT
ORIENTATION: LEFT

## 2023-04-18 ASSESSMENT — PAIN SCALES - GENERAL
PAINLEVEL_OUTOF10: 4
PAINLEVEL_OUTOF10: 2
PAINLEVEL_OUTOF10: 6
PAINLEVEL_OUTOF10: 7

## 2023-04-18 ASSESSMENT — PAIN DESCRIPTION - LOCATION
LOCATION: FLANK

## 2023-04-18 ASSESSMENT — PAIN DESCRIPTION - DESCRIPTORS
DESCRIPTORS: TENDER;ACHING
DESCRIPTORS: ACHING;TENDER
DESCRIPTORS: ACHING

## 2023-04-18 NOTE — CONSULTS
MD [facility] [address]    Urology Consultation    Patient:  Natalia Atkins  MRN: [id number]  YOB: 1965    REASON FOR CONSULT:  Urinary retention    HISTORY OF PRESENT ILLNESS:   The patient is a 61 y.o. female who presented on 4/15 with dysarthria and right facial droop. 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 She was loaded with 300 mg Plavix and admitted to neuro ICU for close monitoring. Patient was recently admitted 3/23 for Klebsiella bacteremia. She was treated with antibiotics and discharged home on Levaquin. She returned to the hospital on 4/10 for hypotension and tachycardia related to dehydration. During this admission, a Medina catheter was placed. Creatinine 0.51 (at baseline) from 1.50 on 4/10/23, WBC 13.1, Hgb 8.8. Urinalysis negative. Urine culture from 4/10 no growth. Renal U/S 4/10/ mild left hydronephrosis appears increased. Left renal cortical mass. Debris in bladder. Postvoid volume 868 ml.  CT AP with IV contrast  showed striated nephrogram of left kidney, suspicious for acute pyelonephritis. Mild infiltration of fat surrounding left kidney and ureter. Bladder wall thickening. Patient's old records, notes and chart reviewed and summarized above.     Past Medical History:    Past Medical History:   Diagnosis Date    Anxiety     Chronic back pain     Hypertension     Mitral valve prolapse        Past Surgical History:    Past Surgical History:   Procedure Laterality Date     SECTION      HYSTERECTOMY (CERVIX STATUS UNKNOWN)  2008    TONSILLECTOMY       Previous  surgery: none   Medications:    Scheduled Meds:   enoxaparin  40 mg SubCUTAneous Daily    insulin lispro  0-4 Units SubCUTAneous TID     insulin lispro  0-4 Units SubCUTAneous Nightly    atorvastatin  40 mg Oral Nightly    ALPRAZolam  0.5 mg Oral 4x daily    QUEtiapine  200 mg Oral Nightly    aspirin  81 mg Oral Daily    clopidogrel  75 mg Oral Daily    cefTRIAXone
diverticulitis. Pelvis: The urinary bladder is decompressed by a Medina catheter. There is   bladder wall thickening, likely related to under distension and possibly   superimposed cystitis. The patient is likely status post hysterectomy. The   remainder of the pelvic structures are grossly within normal limits. There   is no free pelvic fluid. Peritoneum/Retroperitoneum: There is no ascites or pneumoperitoneum. The   abdominal aorta is of normal size. There is no mesenteric or retroperitoneal   lymphadenopathy. Bones/Soft Tissues: There is no acute osseous abnormality. There is no acute   soft tissue abnormality. Impression   Striated nephrogram of the left kidney, suspicious for acute pyelonephritis. Mild infiltration of fat surrounding the left kidney and ureter, suspicious   for ascending UTI. Bladder wall thickening, likely related to under distension and possibly   superimposed cystitis. I have personally reviewed the past medical history, past surgical history, medications, social history, and family history, and I haveupdated the database accordingly. Allergies:   Motrin [ibuprofen micronized]     Review of Systems:     Review of Systems   Genitourinary:  Positive for flank pain. Bilateral   All other systems reviewed and are negative. Physical Examination :       Physical Exam  Vitals and nursing note reviewed. Constitutional:       Appearance: Normal appearance. She is not ill-appearing. HENT:      Head: Normocephalic and atraumatic. Right Ear: External ear normal.      Left Ear: External ear normal.      Nose: Nose normal.      Mouth/Throat:      Mouth: Mucous membranes are moist.      Pharynx: Oropharynx is clear. Eyes:      General: No scleral icterus. Conjunctiva/sclera: Conjunctivae normal.   Cardiovascular:      Rate and Rhythm: Normal rate and regular rhythm. Heart sounds: Normal heart sounds.  No murmur

## 2023-04-18 NOTE — DISCHARGE INSTRUCTIONS
MOROCHO CATHETER CARE  A morocho catheter is a hollow tube put into the bladder to drain urine. The catheter has a balloon on the end inside your bladder. This balloon holds the catheter in place. EQUIPMENT  Soap and water  Washcloth and towel  DAILY CARE  Wash the area around the catheter with mild soap and water daily. Wash from front to back for females. Rinse areas with warm water. Dry  Wash the catheter from body to the connecting tubing. REMINDER  Avoid pulling on the catheter. Tape or secure catheter to the upper leg. Keep drainage bag below the level of the bladder. Drink enough fluids to keep the urine clear or pale yellow. Check with your doctor about any limits on fluid intake. You may shower with the catheter in place. Do not take tub baths. Do not use creams or ointments around the catheter unless ordered by your doctor. The catheter should be changed regularly. This may be done by a home care nurse or doctor. Be sure to keep all appointments for catheter change. CALL YOUR DOCTOR IF YOU:  Have pain where the catheter enters your body or in your bladder. Have bloody or pus-like drainage around the catheter. Have a fever over 100.4 degrees. See blood in the urine that has not been there before. CALL UROLOGY CLINIC TO SCHEDULE APPOINTMENT FOR IN 1-2 WEEKS.

## 2023-04-18 NOTE — PLAN OF CARE
Problem: Discharge Planning  Goal: Discharge to home or other facility with appropriate resources  4/18/2023 0309 by Becki Graves RN  Outcome: Progressing  4/17/2023 1840 by Kelley Palomares RN  Outcome: Progressing     Problem: Skin/Tissue Integrity  Goal: Absence of new skin breakdown  Description: 1. Monitor for areas of redness and/or skin breakdown  2. Assess vascular access sites hourly  3. Every 4-6 hours minimum:  Change oxygen saturation probe site  4. Every 4-6 hours:  If on nasal continuous positive airway pressure, respiratory therapy assess nares and determine need for appliance change or resting period.   4/17/2023 1840 by Kelley Palomares RN  Outcome: Progressing     Problem: Safety - Adult  Goal: Free from fall injury  4/18/2023 0309 by Becki Graves RN  Outcome: Progressing  4/17/2023 1840 by Kelley Palomares RN  Outcome: Progressing     Problem: Chronic Conditions and Co-morbidities  Goal: Patient's chronic conditions and co-morbidity symptoms are monitored and maintained or improved  4/17/2023 1840 by Kelley Palmoares RN  Outcome: Progressing

## 2023-04-19 PROBLEM — N12 PYELONEPHRITIS: Status: ACTIVE | Noted: 2023-03-24

## 2023-04-19 LAB
ANION GAP SERPL CALCULATED.3IONS-SCNC: 9 MMOL/L (ref 9–17)
BUN SERPL-MCNC: 5 MG/DL (ref 6–20)
CALCIUM SERPL-MCNC: 8.7 MG/DL (ref 8.6–10.4)
CHLORIDE SERPL-SCNC: 110 MMOL/L (ref 98–107)
CO2 SERPL-SCNC: 23 MMOL/L (ref 20–31)
CREAT SERPL-MCNC: 0.5 MG/DL (ref 0.5–0.9)
GFR SERPL CREATININE-BSD FRML MDRD: >60 ML/MIN/1.73M2
GLUCOSE BLD-MCNC: 121 MG/DL (ref 65–105)
GLUCOSE BLD-MCNC: 150 MG/DL (ref 65–105)
GLUCOSE BLD-MCNC: 200 MG/DL (ref 65–105)
GLUCOSE SERPL-MCNC: 109 MG/DL (ref 70–99)
HCT VFR BLD AUTO: 26.9 % (ref 36.3–47.1)
HGB BLD-MCNC: 8.1 G/DL (ref 11.9–15.1)
MAGNESIUM SERPL-MCNC: 1.6 MG/DL (ref 1.6–2.6)
MCH RBC QN AUTO: 27.1 PG (ref 25.2–33.5)
MCHC RBC AUTO-ENTMCNC: 30.1 G/DL (ref 28.4–34.8)
MCV RBC AUTO: 90 FL (ref 82.6–102.9)
NRBC AUTOMATED: 0 PER 100 WBC
PDW BLD-RTO: 17.4 % (ref 11.8–14.4)
PLATELET # BLD AUTO: 364 K/UL (ref 138–453)
PMV BLD AUTO: 9.4 FL (ref 8.1–13.5)
POTASSIUM SERPL-SCNC: 3.9 MMOL/L (ref 3.7–5.3)
RBC # BLD: 2.99 M/UL (ref 3.95–5.11)
SODIUM SERPL-SCNC: 142 MMOL/L (ref 135–144)
WBC # BLD AUTO: 8.1 K/UL (ref 3.5–11.3)

## 2023-04-19 PROCEDURE — 2580000003 HC RX 258: Performed by: EMERGENCY MEDICINE

## 2023-04-19 PROCEDURE — 83735 ASSAY OF MAGNESIUM: CPT

## 2023-04-19 PROCEDURE — 85027 COMPLETE CBC AUTOMATED: CPT

## 2023-04-19 PROCEDURE — 36415 COLL VENOUS BLD VENIPUNCTURE: CPT

## 2023-04-19 PROCEDURE — 80048 BASIC METABOLIC PNL TOTAL CA: CPT

## 2023-04-19 PROCEDURE — 6360000002 HC RX W HCPCS: Performed by: PSYCHIATRY & NEUROLOGY

## 2023-04-19 PROCEDURE — 2580000003 HC RX 258: Performed by: NURSE PRACTITIONER

## 2023-04-19 PROCEDURE — 92507 TX SP LANG VOICE COMM INDIV: CPT

## 2023-04-19 PROCEDURE — 6360000002 HC RX W HCPCS: Performed by: NURSE PRACTITIONER

## 2023-04-19 PROCEDURE — 2500000003 HC RX 250 WO HCPCS

## 2023-04-19 PROCEDURE — 2060000000 HC ICU INTERMEDIATE R&B

## 2023-04-19 PROCEDURE — 99222 1ST HOSP IP/OBS MODERATE 55: CPT | Performed by: FAMILY MEDICINE

## 2023-04-19 PROCEDURE — 6370000000 HC RX 637 (ALT 250 FOR IP)

## 2023-04-19 PROCEDURE — 6370000000 HC RX 637 (ALT 250 FOR IP): Performed by: NURSE PRACTITIONER

## 2023-04-19 PROCEDURE — 97129 THER IVNTJ 1ST 15 MIN: CPT

## 2023-04-19 PROCEDURE — 82947 ASSAY GLUCOSE BLOOD QUANT: CPT

## 2023-04-19 RX ADMIN — ALPRAZOLAM 0.5 MG: 0.5 TABLET ORAL at 12:53

## 2023-04-19 RX ADMIN — OXYCODONE 5 MG: 5 TABLET ORAL at 10:16

## 2023-04-19 RX ADMIN — ALPRAZOLAM 0.5 MG: 0.5 TABLET ORAL at 18:22

## 2023-04-19 RX ADMIN — SODIUM CHLORIDE, PRESERVATIVE FREE 3 ML: 5 INJECTION INTRAVENOUS at 05:30

## 2023-04-19 RX ADMIN — ALPRAZOLAM 0.5 MG: 0.5 TABLET ORAL at 04:00

## 2023-04-19 RX ADMIN — DESMOPRESSIN ACETATE 40 MG: 0.2 TABLET ORAL at 22:46

## 2023-04-19 RX ADMIN — LABETALOL HYDROCHLORIDE 10 MG: 5 INJECTION, SOLUTION INTRAVENOUS at 17:49

## 2023-04-19 RX ADMIN — INSULIN LISPRO 1 UNITS: 100 INJECTION, SOLUTION INTRAVENOUS; SUBCUTANEOUS at 16:37

## 2023-04-19 RX ADMIN — OXYCODONE 5 MG: 5 TABLET ORAL at 04:15

## 2023-04-19 RX ADMIN — CEFTRIAXONE SODIUM 1000 MG: 10 INJECTION, POWDER, FOR SOLUTION INTRAVENOUS at 18:22

## 2023-04-19 RX ADMIN — ASPIRIN 81 MG: 81 TABLET, CHEWABLE ORAL at 08:25

## 2023-04-19 RX ADMIN — CLOPIDOGREL BISULFATE 75 MG: 75 TABLET, FILM COATED ORAL at 08:25

## 2023-04-19 RX ADMIN — OXYCODONE 5 MG: 5 TABLET ORAL at 16:33

## 2023-04-19 RX ADMIN — OXYCODONE 5 MG: 5 TABLET ORAL at 22:46

## 2023-04-19 RX ADMIN — SODIUM CHLORIDE, PRESERVATIVE FREE 3 ML: 5 INJECTION INTRAVENOUS at 21:00

## 2023-04-19 RX ADMIN — ENOXAPARIN SODIUM 30 MG: 100 INJECTION SUBCUTANEOUS at 08:25

## 2023-04-19 RX ADMIN — QUETIAPINE FUMARATE 200 MG: 200 TABLET ORAL at 22:46

## 2023-04-19 ASSESSMENT — ENCOUNTER SYMPTOMS
ABDOMINAL PAIN: 0
BLOOD IN STOOL: 0
NAUSEA: 0
COUGH: 0
SHORTNESS OF BREATH: 0
DIARRHEA: 0
WHEEZING: 0
VOMITING: 0
CONSTIPATION: 0
STRIDOR: 0

## 2023-04-19 ASSESSMENT — PAIN DESCRIPTION - LOCATION: LOCATION: BACK

## 2023-04-19 ASSESSMENT — PAIN SCALES - GENERAL
PAINLEVEL_OUTOF10: 7
PAINLEVEL_OUTOF10: 7
PAINLEVEL_OUTOF10: 6
PAINLEVEL_OUTOF10: 6

## 2023-04-19 NOTE — PLAN OF CARE
Problem: Discharge Planning  Goal: Discharge to home or other facility with appropriate resources  Outcome: Progressing  Flowsheets (Taken 4/19/2023 0800)  Discharge to home or other facility with appropriate resources: Arrange for needed discharge resources and transportation as appropriate     Problem: Skin/Tissue Integrity  Goal: Absence of new skin breakdown  Description: 1. Monitor for areas of redness and/or skin breakdown  2. Assess vascular access sites hourly  3. Every 4-6 hours minimum:  Change oxygen saturation probe site  4. Every 4-6 hours:  If on nasal continuous positive airway pressure, respiratory therapy assess nares and determine need for appliance change or resting period.   Outcome: Progressing     Problem: Safety - Adult  Goal: Free from fall injury  Outcome: Progressing     Problem: Chronic Conditions and Co-morbidities  Goal: Patient's chronic conditions and co-morbidity symptoms are monitored and maintained or improved  Outcome: Progressing  Flowsheets (Taken 4/19/2023 0800)  Care Plan - Patient's Chronic Conditions and Co-Morbidity Symptoms are Monitored and Maintained or Improved: Collaborate with multidisciplinary team to address chronic and comorbid conditions and prevent exacerbation or deterioration     Problem: ABCDS Injury Assessment  Goal: Absence of physical injury  Outcome: Progressing

## 2023-04-19 NOTE — PLAN OF CARE
Problem: Skin/Tissue Integrity  Goal: Absence of new skin breakdown  Description: 1. Monitor for areas of redness and/or skin breakdown  2. Assess vascular access sites hourly  3. Every 4-6 hours minimum:  Change oxygen saturation probe site  4. Every 4-6 hours:  If on nasal continuous positive airway pressure, respiratory therapy assess nares and determine need for appliance change or resting period.   4/18/2023 1839 by Waleska Perez RN  Outcome: Progressing     Problem: Safety - Adult  Goal: Free from fall injury  4/18/2023 1839 by Waleska Perez RN  Outcome: Progressing

## 2023-04-19 NOTE — H&P
[facility]  Office: [address], DO, Reubenck Cowden, DO, Renetta Dietz, DO, Elaina Diogo Everett, DO, Swathi Odell MD, Nabeel Krishnamurthy MD, Dorota Gallegos MD, Jarad Kellogg MD,  Lacy López MD, Marbella Arciniega MD, Nader Garcia, DO, Leonel Williamson MD,  Faiza Fine MD, Kevan Kitchen MD, Juve Marshall, DO, Donnie Valera MD, Lizeth Brewer MD, Elena Gordon, DO, Guerrero Sanchez MD, Power Orantes MD, Kellen Baires MD, Jackson Saeed MD,  Sam Page, DO, Jason Biswas MD,  Andrzej Tam, CNP,  Alycia Watts, CNP, Shweta Zavala, CNP, Daniel Minor, CNP,  Myah Parra, [facility], Burna Moritz, CNP, Xiomara Morejon, CNP, Vin Montero, CNP, Hyacinth Sheldon, CNP, Francia Coates, CNP, Janell Hong PAJT, Tex Jasmine, CNS, Beto Jamil, CNP, Betty Nieto, CNP         [facility]    HISTORY AND PHYSICAL EXAMINATION            Date:   4/19/2023  Patient name:  Rboert Antunez  Date of admission:  4/16/2023  1:14 PM  MRN:   [id number]  Account:  [de-identified]  YOB: 1965  PCP:    Camron Avila  Room:   [facility]  Code Status:    Full Code    Chief Complaint:     Chief Complaint   Patient presents with    Other     Pt wants cath out     Blood Sugar Problem     Possibly high     Cerebrovascular Accident       History Obtained From:     patient, electronic medical record    History of Present Illness:     Per NICU   Initial Presentation (Admitted 4/16/23): The patient is a 62 yo female with history of HTN, MVP, anxiety, and urinary retention requiring morocho catheter who presents with right sided facial droop and recent falls for the past 2 days. Symptoms reportedly worsened on 4/15 around 11pm.  was concerning her blood sugars were causing symptoms and took her to the ER for evaluation. CT head showed a small hypodensity left centrum semiovale.  CTA head/neck showed ICAD and a left

## 2023-04-20 VITALS
HEART RATE: 101 BPM | SYSTOLIC BLOOD PRESSURE: 137 MMHG | OXYGEN SATURATION: 98 % | RESPIRATION RATE: 17 BRPM | TEMPERATURE: 98 F | WEIGHT: 117.06 LBS | DIASTOLIC BLOOD PRESSURE: 89 MMHG | BODY MASS INDEX: 21.41 KG/M2

## 2023-04-20 LAB
GLUCOSE BLD-MCNC: 110 MG/DL (ref 65–105)
GLUCOSE BLD-MCNC: 121 MG/DL (ref 65–105)
GLUCOSE BLD-MCNC: 148 MG/DL (ref 65–105)
LV EF: 60 %
LVEF MODALITY: NORMAL

## 2023-04-20 PROCEDURE — 93306 TTE W/DOPPLER COMPLETE: CPT

## 2023-04-20 PROCEDURE — 6370000000 HC RX 637 (ALT 250 FOR IP)

## 2023-04-20 PROCEDURE — 82947 ASSAY GLUCOSE BLOOD QUANT: CPT

## 2023-04-20 PROCEDURE — 2580000003 HC RX 258: Performed by: EMERGENCY MEDICINE

## 2023-04-20 PROCEDURE — 97130 THER IVNTJ EA ADDL 15 MIN: CPT

## 2023-04-20 PROCEDURE — 6370000000 HC RX 637 (ALT 250 FOR IP): Performed by: NURSE PRACTITIONER

## 2023-04-20 PROCEDURE — 93356 MYOCRD STRAIN IMG SPCKL TRCK: CPT

## 2023-04-20 PROCEDURE — 99232 SBSQ HOSP IP/OBS MODERATE 35: CPT | Performed by: STUDENT IN AN ORGANIZED HEALTH CARE EDUCATION/TRAINING PROGRAM

## 2023-04-20 PROCEDURE — 6360000002 HC RX W HCPCS: Performed by: PSYCHIATRY & NEUROLOGY

## 2023-04-20 PROCEDURE — 97129 THER IVNTJ 1ST 15 MIN: CPT

## 2023-04-20 RX ORDER — ASPIRIN 81 MG/1
81 TABLET, CHEWABLE ORAL DAILY
Qty: 30 TABLET | Refills: 3 | Status: SHIPPED | OUTPATIENT
Start: 2023-04-21

## 2023-04-20 RX ORDER — LEVOFLOXACIN 500 MG/1
500 TABLET, FILM COATED ORAL DAILY
Qty: 10 TABLET | Refills: 0 | Status: SHIPPED | OUTPATIENT
Start: 2023-04-20 | End: 2023-04-30

## 2023-04-20 RX ORDER — ALPRAZOLAM 0.5 MG/1
0.5 TABLET ORAL 2 TIMES DAILY PRN
Qty: 20 TABLET | Refills: 0 | Status: SHIPPED | OUTPATIENT
Start: 2023-04-20 | End: 2023-05-10

## 2023-04-20 RX ORDER — CLOPIDOGREL BISULFATE 75 MG/1
75 TABLET ORAL DAILY
Qty: 30 TABLET | Refills: 3 | Status: SHIPPED | OUTPATIENT
Start: 2023-04-21

## 2023-04-20 RX ORDER — ATORVASTATIN CALCIUM 40 MG/1
40 TABLET, FILM COATED ORAL NIGHTLY
Qty: 30 TABLET | Refills: 3 | Status: SHIPPED | OUTPATIENT
Start: 2023-04-20

## 2023-04-20 RX ADMIN — CLOPIDOGREL BISULFATE 75 MG: 75 TABLET, FILM COATED ORAL at 09:29

## 2023-04-20 RX ADMIN — ALPRAZOLAM 0.5 MG: 0.5 TABLET ORAL at 07:24

## 2023-04-20 RX ADMIN — ALPRAZOLAM 0.5 MG: 0.5 TABLET ORAL at 12:38

## 2023-04-20 RX ADMIN — ALPRAZOLAM 0.5 MG: 0.5 TABLET ORAL at 00:00

## 2023-04-20 RX ADMIN — OXYCODONE 5 MG: 5 TABLET ORAL at 13:21

## 2023-04-20 RX ADMIN — SODIUM CHLORIDE, PRESERVATIVE FREE 3 ML: 5 INJECTION INTRAVENOUS at 05:11

## 2023-04-20 RX ADMIN — OXYCODONE 5 MG: 5 TABLET ORAL at 06:30

## 2023-04-20 RX ADMIN — ENOXAPARIN SODIUM 30 MG: 100 INJECTION SUBCUTANEOUS at 09:29

## 2023-04-20 RX ADMIN — ASPIRIN 81 MG: 81 TABLET, CHEWABLE ORAL at 09:29

## 2023-04-20 ASSESSMENT — PAIN SCALES - GENERAL: PAINLEVEL_OUTOF10: 6

## 2023-04-20 NOTE — PLAN OF CARE
Problem: Discharge Planning  Goal: Discharge to home or other facility with appropriate resources  4/19/2023 1706 by Micaela Mcdonald RN  Outcome: Progressing  Flowsheets (Taken 4/19/2023 0800)  Discharge to home or other facility with appropriate resources: Arrange for needed discharge resources and transportation as appropriate     Problem: Skin/Tissue Integrity  Goal: Absence of new skin breakdown  Description: 1. Monitor for areas of redness and/or skin breakdown  2. Assess vascular access sites hourly  3. Every 4-6 hours minimum:  Change oxygen saturation probe site  4. Every 4-6 hours:  If on nasal continuous positive airway pressure, respiratory therapy assess nares and determine need for appliance change or resting period.   4/19/2023 1706 by Micaela Mcdonald RN  Outcome: Progressing     Problem: Safety - Adult  Goal: Free from fall injury  4/19/2023 1706 by Micaela Mcdonald RN  Outcome: Progressing     Problem: Chronic Conditions and Co-morbidities  Goal: Patient's chronic conditions and co-morbidity symptoms are monitored and maintained or improved  4/19/2023 1706 by Micaela Mcdonald RN  Outcome: Progressing  Flowsheets (Taken 4/19/2023 0800)  Care Plan - Patient's Chronic Conditions and Co-Morbidity Symptoms are Monitored and Maintained or Improved: Collaborate with multidisciplinary team to address chronic and comorbid conditions and prevent exacerbation or deterioration     Problem: ABCDS Injury Assessment  Goal: Absence of physical injury  4/19/2023 1706 by Micaela Mcdonald RN  Outcome: Progressing

## 2023-04-20 NOTE — DISCHARGE INSTR - COC
Therapy:   [address] YAZMIN Diet List:142761237}    Routes of Feeding: {CHP DME Other Feedings:087784386}  Liquids: {Slp liquid thickness:60995}  Daily Fluid Restriction: {CHP DME Yes amt example:872874245}  Last Modified Barium Swallow with Video (Video Swallowing Test): {Done Not Done GEDT:911524397}    Treatments at the Time of Hospital Discharge:   Respiratory Treatments: ***  Oxygen Therapy:  {Therapy; copd oxygen:88895}  Ventilator:    {[facility] Vent ZBIA:158899410}    Rehab Therapies: {THERAPEUTIC INTERVENTION:6048696210}  Weight Bearing Status/Restrictions: {[facility] Weight Bearin}  Other Medical Equipment (for information only, NOT a DME order):  {EQUIPMENT:361982522}  Other Treatments: ***    Patient's personal belongings (please select all that are sent with patient):  {[facility] DME Belongings:850296449}    RN SIGNATURE:  {Esignature:586685466}    CASE MANAGEMENT/SOCIAL WORK SECTION    Inpatient Status Date: ***    Readmission Risk Assessment Score:  Readmission Risk              Risk of Unplanned Readmission:  26           Discharging to Facility/ Agency   Name:   Address:  Phone:  Fax:    Dialysis Facility (if applicable)   Name:  Address:  Dialysis Schedule:  Phone:  Fax:    / signature: {Esignature:165918961}    PHYSICIAN SECTION    Prognosis: Guarded    Condition at Discharge: Stable    Rehab Potential (if transferring to Rehab): Fair    Recommended Labs or Other Treatments After Discharge: see pcp and neurology    Physician Certification: I certify the above information and transfer of Dorthea Schaumann  is necessary for the continuing treatment of the diagnosis listed and that she requires Home Care for less 30 days.      Update Admission H&P: No change in H&P    PHYSICIAN SIGNATURE:  Electronically signed by Andre Guevara MD on 23 at 10:54 AM EDT

## 2023-04-20 NOTE — PLAN OF CARE
Problem: Discharge Planning  Goal: Discharge to home or other facility with appropriate resources  Outcome: Completed  Flowsheets (Taken 4/20/2023 0800)  Discharge to home or other facility with appropriate resources: Identify barriers to discharge with patient and caregiver     Problem: Skin/Tissue Integrity  Goal: Absence of new skin breakdown  Description: 1. Monitor for areas of redness and/or skin breakdown  2. Assess vascular access sites hourly  3. Every 4-6 hours minimum:  Change oxygen saturation probe site  4. Every 4-6 hours:  If on nasal continuous positive airway pressure, respiratory therapy assess nares and determine need for appliance change or resting period.   Outcome: Completed     Problem: Safety - Adult  Goal: Free from fall injury  Outcome: Completed     Problem: Chronic Conditions and Co-morbidities  Goal: Patient's chronic conditions and co-morbidity symptoms are monitored and maintained or improved  Outcome: Completed  Flowsheets (Taken 4/20/2023 0800)  Care Plan - Patient's Chronic Conditions and Co-Morbidity Symptoms are Monitored and Maintained or Improved: Monitor and assess patient's chronic conditions and comorbid symptoms for stability, deterioration, or improvement     Problem: ABCDS Injury Assessment  Goal: Absence of physical injury  Outcome: Completed     Problem: Pain  Goal: Verbalizes/displays adequate comfort level or baseline comfort level  Outcome: Completed

## 2023-04-20 NOTE — PROGRESS NOTES
CLINICAL PHARMACY NOTE: MEDS TO BEDS    Total # of Prescriptions Filled: 3   The following medications were delivered to the patient:  Plavix  Aspirin  atorvastatin    Additional Documentation:
Echo completed at the bedside. RN performed bubble study.
Echo done before discharge. Patient adamant on leaving now. Primary physician Tj Nevarez contacted. Okay to discharge without echo results per Dr. Salvatore Stokes.
Medina replaced on 4/17 at 1800  UA, Culture sent
Okay to discharge from ID standpoint. Levaquin 500 mg po daily until 4/30/23. Reconciled. Sent to [facility]. Follow-up with Dr. Tanna Quintana in the office in 2-weeks.
Pharmacy Note     Enoxaparin Dose Adjustment    Jorge Salmon is a 61 y.o. female. Pharmacist assessment of enoxaparin dose for VTE prophylaxis. Recent Labs     04/17/23  0025 04/18/23  0455   BUN 5* 4*       Recent Labs     04/17/23  0025 04/18/23  0455   CREATININE 0.62 0.51       Estimated Creatinine Clearance: 95 mL/min (based on SCr of 0.51 mg/dL).     Height:   Ht Readings from Last 1 Encounters:   04/10/23 5' 2\" (1.575 m)     Weight:  Wt Readings from Last 1 Encounters:   04/18/23 117 lb 1 oz (53.1 kg)       The following enoxaparin dose has been adjusted based upon renal function and/or patient weight per P&T Guidelines:             Lovenox 30 mg daily changed to Lovenox 40 mg daily    Isaura Hoang, PharmD 4/18/2023 7:11 AM
Physical Therapy  Facility/Department: [address] NEURO ICU  Physical Therapy Initial Assessment    Name: Dinesh Jimenez  : 1965  MRN: [id number]  Date of Service: 2023      The patient is a 61 y.o. female who presents with concern for dysarthria. Approximately 2 days ago the patient's  noticed that the patient had been having right-sided facial droop. Patient had also been reportedly falling for the past 2-3 days as well. Patient started to slightly slurred her speech, approximately 11 PM on 4/15 symptoms worsened. Discharge Recommendations:  Further therapy recommended at discharge. PT Equipment Recommendations  Equipment Needed: No      Patient Diagnosis(es): The primary encounter diagnosis was Ischemic stroke of frontal lobe ([facility] [address] A diagnosis of SAH (subarachnoid hemorrhage) ([facility] [address] was also pertinent to this visit. Past Medical History:  has a past medical history of Anxiety, Chronic back pain, Hypertension, and Mitral valve prolapse. Past Surgical History:  has a past surgical history that includes  section; Hysterectomy (); and Tonsillectomy. Assessment   Per medical record, pt states she lives alone; per chart, her  was the one that noticed the symptoms that brought her to the hospital.  Pt was oriented x 4 otherwise, but exhibits mild R sided neglect resulting in her running lightly into objects that she didn't see on her R side. Pt states she doesn't want to go to rehab and plans to return home. I believe she's currently unsafe to be home alone d/t R neglect. Body Structures, Functions, Activity Limitations Requiring Skilled Therapeutic Intervention: Decreased functional mobility ; Decreased strength;Decreased safe awareness;Decreased balance;Decreased vision/visual deficit; Decreased fine motor control;Decreased coordination  Therapy Prognosis: Good  Decision Making: Medium Complexity  Requires PT Follow-Up: Yes  Activity Tolerance  Activity Tolerance:
Physician Progress Note      PATIENT:               Jimbo Sharma  CSN #:                  [id number]  :                       1965  ADMIT DATE:       2023 1:14 PM  [address] [place] DATE:  RESPONDING  PROVIDER #:        Astrid Bianchi MD          QUERY TEXT:    Pt admitted with [address] Pt noted to have pyelonephritis with elevated WBC and   lactic acid, and tachycardia. If possible, please document in the progress   notes and discharge summary if you are evaluating and /or treating any of the   following: The medical record reflects the following:  Risk Factors: Recent Klebsiella bacteremia 2/2 UTI with persistent left-sided   pyelonephritis  Clinical Indicators: per IM H&P \"leukocytosis, lactic acidosis that has since   resolved, nephritis versus cystitis along with urinary retention\", per ID   \"Striated nephrogram of left kidney suspicious for acute pyelonephritis, CT   abd/pelvis suspicious for ascending UTI\", UA showed small leukocytes, urine   and blood cx no growth, WBC 13.1, lactic acid 5.5, 's  Treatment: IV fluids, IV Rocephin, ID consult, CT abd, labs, cultures,   monitoring    Thank you, Guille Eli, [facility]  email - [email or web address]  hmjg- [phone]  office hours M-F-7A-[id number]  Options provided:  -- Sepsis, present on admission  -- Sepsis, present on admission, now resolved  -- Pyelonephritis without sepsis  -- Other - I will add my own diagnosis  -- Disagree - Not applicable / Not valid  -- Disagree - Clinically unable to determine / Unknown  -- Refer to Clinical Documentation Reviewer    PROVIDER RESPONSE TEXT:    This patient was treated for sepsis this admission, which was present on   admission and is currently resolved.     Query created by: Romeo Salamanca on 2023 10:49 AM      Electronically signed by:  Astrid Bianchi MD 2023 10:54 AM
SLP ALL NOTES  [facility]  Speech Language Pathology    Date: 4/18/2023  Patient Name: Stefan Felty  YOB: 1965   AGE: 61 y.o. MRN: [id number]        Patient Not Available for Speech Therapy     Due to:  [] Testing  [] Hemodialysis  [] Cancelled by RN  [] Surgery   [] Intubation/Sedation/Pain Medication  [] Medical instability  [x] Other: Pt stated, \"I don't feel like being bothered today. Come back tomorrow. \"     Next scheduled treatment: 04/19/2023  Completed by: Completed by: Stacey Brennan  Clinician    Cosigned By: Blade Bella S.CCC/SLP
SLP ALL NOTES  [facility]  Speech Language Pathology    Date: 4/17/2023  Patient Name: Andre Jacome  YOB: 1965   AGE: 61 y.o. MRN: [id number]    Patient Not Available for Speech Therapy     Due to:  [] Testing  [] Hemodialysis  [] Cancelled by RN  [] Surgery   [] Intubation/Sedation/Pain Medication  [] Medical instability  [x] Other: RN reported Pt had no difficulty with swallowing during eating and pill administration today. Please consult as needed. Next scheduled treatment: 04/18/2023  Completed by: Completed by: Dillan Perry  Clinician    Cosigned By: Mina Jama S.CCC/SLP
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 RN notified. Cognitive Treatment    Treatment time: 11:09- 11:35      Subjective: [x] Alert [x] Cooperative     [] Confused     [] Agitated    [] Lethargic      Objective/Assessment:    Problem Solving/Reasoning: State the opposites: 3/3. State the category: [place] 5/6, increasing to 6/6 with minimal verbal cueing. Category members: concrete 8/10, increasing to 10/10 with minimal verbal cueing. Speech/ Language: Pt displayed mild fluency difficulties characterized by blocking and part word repetitions during natural conversations. Pt presented mild oral groping during blocks. Pt's speech intelligibility and fluency improved during automatic speech tasks and when producing common songs. Situational sentences: 3/3. Automatic speech tasks: 3/3. Completing common sentences 12/13, increasing to 13/13 with minimal verbal cueing. Plan:  [x] Continue ST services    [] Discharge from ST:      Discharge recommendations: []  Further therapy recommended at discharge. The patient
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 Cognitive Treatment    Treatment time: 11:25- 11:49      Subjective: [x] Alert [x] Cooperative     [] Confused     [] Agitated    [] Lethargic      Objective/Assessment:    Recall: Delayed recall of three units: 5/6, increasing to 6/6 with minimal verbal cueing. Problem Solving/Reasoning: Naming objects by attributes: 4/6, increasing to 5/6 with minimal verbal cueing. Inconsistencies in sentences: 3/4, increasing to 4/4 with minimal verbal cueing. State the category: concrete 7/9, increasing to 8/9 with minimal verbal cueing. Opposites 2/6, increasing to 6/6 with minimal verbal cueing. Speech During informal conversations and structured activities, Pt displayed a decreased rate of speech. Pt rate of speech increased during automatic speech tasks and when singing well-known songs (e.g., happy birthday). Plan:  [x] Continue ST services    [] Discharge from ST:      Discharge recommendations: []  Further therapy recommended at discharge. The patient should be able to tolerate at
CT OF THE ABDOMEN AND PELVIS WITH CONTRAST 4/17/2023 2:31 pm       TECHNIQUE:   CT of the abdomen and pelvis was performed with the administration of   intravenous contrast. Multiplanar reformatted images are provided for review. Automated exposure control, iterative reconstruction, and/or weight based   adjustment of the mA/kV was utilized to reduce the radiation dose to as low   as reasonably achievable. COMPARISON:   CT abdomen pelvis March 23, 2023       HISTORY:   ORDERING SYSTEM PROVIDED HISTORY: bilateral flank pain, tachycardia   TECHNOLOGIST PROVIDED HISTORY:   bilateral flank pain, tachycardia       Reason for Exam: bilateral flank pain tachycardia       FINDINGS:   Lower Chest: There is mild dependent atelectasis at the bilateral lung bases. No pleural effusion. The heart is of normal size. No pericardial effusion. Organs: The liver, gallbladder, pancreas, spleen and the bilateral adrenal   glands are otherwise within normal limits. There is striated nephrogram of the left kidney, suspicious for acute   pyelonephritis. There is mild infiltration of fat surrounding the left   kidney and ureter, suspicious for ascending UTI. There are bilateral   extrarenal pelvises. No left or right hydronephrosis. GI/Bowel: There is moderate fecal material in the ascending, transverse and   descending colon. There is no bowel obstruction or pneumoperitoneum. There   is no acute appendicitis. There is no acute diverticulitis. Pelvis: The urinary bladder is decompressed by a Medina catheter. There is   bladder wall thickening, likely related to under distension and possibly   superimposed cystitis. The patient is likely status post hysterectomy. The   remainder of the pelvic structures are grossly within normal limits. There   is no free pelvic fluid. Peritoneum/Retroperitoneum: There is no ascites or pneumoperitoneum. The   abdominal aorta is of normal size.   There is no
Decreased inflection and prosody.)  Intelligibility: No impairment  Overall Impairment Severity: None    Expression  Primary Mode of Expression: Verbal    Pragmatics/Social Functioning  Pragmatics: Within functional limits    Cognition:      Orientation  Overall Orientation Status: Within Normal Limits  Orientation Level: Oriented X4  Memory  Memory: Exceptions to [facility] (Immediate memory: 3/3, 4/5, 2/3)  Short-term Memory: Moderate (1/3, 2/3)  Problem Solving  Problem Solving: Exceptions to [facility]  Verbal Reasoning Skills: Moderate (word associations WFL, antonyms 2/3, inductive reasoning 3/4, similarities and differences 2/4, deductive reasoning 1/3.)  Sequencing: Severe (1/4)  Abstract Reasoning  Abstract Reasoning: Exceptions to [facility]  Divergent Thinking: Severe (3 animals/ 30 seconds.)  Safety/Judgment  Safety/Judgment: Exceptions to [facility]  Insight: Severe   (1/3)  Flexibility of Thought: Severe (0/3)    Prognosis:  Speech Therapy Prognosis  Prognosis: Fair    Education:  Patient Education: Yes  Patient Education Response: Verbalizes understanding;Demonstrated understanding     Therapy Time:   Individual Concurrent Group Co-treatment   Time In 9:47         Time Out 10:03         Minutes 16              Electronically signed by Completed by: Wilner Calderón  Clinician    Cosigned By: Teagan Ceja S.CCC/SLP
to avoid obstacles.)  Base of Support: Narrowed  Assistive Device: Gait belt     AROM: Within functional limits  PROM: Within functional limits  Strength: Within functional limits  Coordination: Generally decreased, functional (During finger-to-thumb test with R hand, 1 error made, 0 errors with L hand, overall pt functional)  Tone: Normal  Sensation: Intact  ADL  Feeding: Supervision  Grooming: Minimal assistance; Increased time to complete (Pt demo'd good BUE strength earlier in session, yet when attempting to squeeze denture adhesive container onto upper dentures pt struggld and writer provided hand-over-hand)  UE Bathing: Supervision; Increased time to complete  LE Bathing: Supervision; Increased time to complete  UE Dressing: Supervision; Increased time to complete  LE Dressing: Supervision; Increased time to complete  Toileting: Stand by assistance; Increased time to complete  Additional Comments: Pt stood sinkside for oral care activity, pt avoided all obstacles during func mob, pt doffed/donned R sock sitting EOB this date, pt donned gown onto back sitting EOB, however, pt unaware she had trapped IV and BP cuff lines by doing this, pt performed [facility] activity seated EOB with medium-small objects without difficulty or loss of speed     Activity Tolerance  Activity Tolerance: Patient tolerated treatment well  Activity Tolerance Comments: pt exhibits decreased awareness of R sided deficits and R neglect; she lives alone which at this time is a safety hazard        Vision  Vision: Within Functional Limits (According to writer's formal tests pt's vision was [place], however, per PT [place] pt demo'd L neglect, pt with cues was able to avoid R obstacles, located peripheral objects and good tracking noted, CTA)  Vision Exceptions:  (L gaze preference)  Hearing  Hearing: Within functional limits  Cognition  Overall Cognitive Status: [facility]  Cognition Comment:  (pt exhbits very flat affect, but oriented)  Orientation  Overall
segment of the left internal carotid artery. 50% stenosis in the A2 segment of the left AMNDY. No acute abnormality or flow-limiting stenosis of the major arteries of the neck.        Physical Examination:        General appearance:  alert, cooperative and no distress  Mental Status:  oriented to person, place and time and normal affect  Lungs:  clear to auscultation bilaterally, normal effort  Heart:  regular rate and rhythm, no murmur  Abdomen:  soft, nontender, nondistended, normal bowel sounds, no masses, hepatomegaly, splenomegaly  Extremities:  no edema, redness, tenderness in the calves  Skin:  no gross lesions, rashes, induration    Assessment:        Hospital Problems             Last Modified POA    Acute pyelonephritis 4/19/2023 Yes    Type 2 diabetes mellitus without complication, without long-term current use of insulin ([address] 4/16/2023 Yes    Intracranial atherosclerosis 4/16/2023 Yes    SAH (subarachnoid hemorrhage) ([address] 4/16/2023 Yes    Ischemic stroke of frontal lobe ([address] 4/16/2023 Yes    Leukocytosis 4/18/2023 Yes       Plan:        Can dc after echo  Aspirin lipitor plavix sent to pharmacy  No narcotics  Can resume home seroquel and xanax    Val Gardner MD  4/20/2023  10:49 AM
prophylaxis: Not indicated    ID:  - Tmax 36.8  - Mild leukocytosis, WBC 13.1 (up from 9.8)  - CXR unremarkable  - UA negative  - Blood cultures negative 12 hours  - Monitor off antibiotics for now  - Continue to trend lactic q6h  - Continue to monitor for fevers  - Daily CBC    HEME:   - H&H 8.8/30.6  - Platelets 693  - Daily CBC    ENDOCRINE:  - Continue to monitor blood glucose, goal <180  - Hemoglobin A1c pending  - Low ISS     OTHER:  - PT/OT/ST   - Code Status: Full    PROPHYLAXIS:  Stress ulcer: NI    DVT PROPHYLAXIS:  - SCD sleeves - Thigh High   - Start if CT head stable    DISPOSITION:  [x] To remain ICU: close neurological monitoring  [] OK for out of ICU from Neuro Critical Care standpoint    We will continue to follow along. For any changes in exam or patient status please contact Neuro Critical Care.       ASHOK Velázquez - CNP  Neuro Critical Care  Pager [phone]  4/17/2023     7:15 AM
with right facial droop and falls was found to have left MCA territory infarction and small SAH. Concomitant lactic acidosis, tachycardic, and bilateral flank pain also being worked up. Patient has PMHx of DM, HTN, MVP, urinary retention retention with past associated bacteremia. NEUROLOGIC:  - Acute left MCA infarction  - Small left frontal SAH, suspected to be traumatic  - DAPT with aspirin and Plavix started on 4/17 after stable repeat CT head, consider repeating neuroimaing 24 hrs s/p initiation of DAPT  - Goal SBP less than 140  - Neuro checks per protocol  - Lipitor 40 mg at bedtime  - , hemoglobin A1c 8.6, echo pending    CARDIOVASCULAR:  - Tachycardia-improved  - Goal SBP's in 140  - Echo pending  - Continue telemetry    PULMONARY:  - No acute issues    RENAL/FLUID/ELECTROLYTE:  - Concern for cystitis and pyelonephritis  - History of urinary retention and associated bacteremia, has seen Dr. Froylan Underwood in past  - Urology consulted, evaluation pending  - BUN 4/ Creatinine 0.51  - Urine output 2,385 ml/kg total over 24 hours  - IVF: LR at 100 cc/h  - Replace electrolytes PRN  - Daily BMP    GI/NUTRITION:  NUTRITION:  ADULT DIET; Regular; 3 carb choices (45 gm/meal)  - GI prophylaxis: none    ID:  - Concern for cystitis/pyelonephritis  - Tmax 99.8  - WBC 7.4  - Infectious work up:   UA showed small leuk esterase presence, 2-5 WBC, 2-5 casts, trace urine hemoglobin,   urine cultures pending  Blood cultures negative x1 day  - Continue to monitor for fevers  - Rocephin 1 g IV every 24 hours x7 days started on 4/17  - Daily CBC  - ID & Urology consulted    HEME:   - H&H 8.5, 27.6  - Platelets 625  - Daily CBC    ENDOCRINE:  - Continue to monitor blood glucose, goal <180  - DM  - ISS, hypoglycemia protocol      DVT PROPHYLAXIS:  - Lovenox 30 mg SQ qd    DISPOSITION: transfer to stepdown  [] To remain ICU:   [x] OK for out of ICU from Neuro Critical Care standpoint    We will continue to follow along.

## 2023-04-20 NOTE — CARE COORDINATION
04/17/23 1205   Readmission Assessment   Number of Days since last admission? 1-7 days   Previous Disposition Home with Home Health   Who is being Interviewed Patient   What was the patient's/caregiver's perception as to why they think they needed to return back to the hospital? Other (Comment)  (continued medical issues  (new))   Did you visit your Primary Care Physician after you left the hospital, before you returned this time? No   Why weren't you able to visit your PCP? Other (Comment)  (new stroke like symptoms)   Does the patient report anything that got in the way of taking their medications? No   In our efforts to provide the best possible care to you and others like you, can you think of anything that we could have done to help you after you left the hospital the first time, so that you might not have needed to return so soon?  Other (Comment)  (n/a)
Patient states she has no homecare. I see in the notes that she was discharged 8 days ago with [facility]. [address] spoke to [place]. He tells me that the patient called and asked to be discharged form their services.
Spoke to patient at bedside along with RN. Offered HC to patient and she refuses. She has had HC in the past on has fired them.   She states \"She doesn't want to be bothered with them\"
When patient was asked why she cancelled her [place] after last hospitalization, she states she didn't want to be bothered. Asked if she would consider having home care again she shrugged her shoulders. Will revisit once a need is determined.   Awaiting therapy evaluations
how difficult have these problems made it for you to do your work, take care of things at home, or get along with other people?    [x] Not difficult at all  [] Somewhat Difficult  [] Very Difficult  []  Extremely Difficult

## 2023-04-26 NOTE — FLOWSHEET NOTE
Stroke Follow up Phone Call    Maricel gamble is HEBER from [facility] stroke team calling to see how you are doing since your d/c. Medication List        START taking these medications      aspirin 81 MG chewable tablet  Take 1 tablet by mouth daily     atorvastatin 40 MG tablet  Commonly known as: LIPITOR  Take 1 tablet by mouth nightly     clopidogrel 75 MG tablet  Commonly known as: PLAVIX  Take 1 tablet by mouth daily     levoFLOXacin 500 MG tablet  Commonly known as: LEVAQUIN  Take 1 tablet by mouth daily for 10 days            CONTINUE taking these medications      ALPRAZolam 0.5 MG tablet  Commonly known as: XANAX  Take 1 tablet by mouth 2 times daily as needed for Sleep for up to 20 days. Max Daily Amount: 1 mg     Blood Pressure Kit  1 kit by Does not apply route 2 times daily as needed (Before metoprolol administration)     * glucose monitoring kit  1 kit by Does not apply route daily     * glucose monitoring kit  As directed     InCare Straight 12FR/20CM Misc  1 Units by Does not apply route every 4 hours Straight cath every 4-6 hours for Urinary retention over 400 mL     * Lancets Misc  1 each by Does not apply route 2 times daily     * Lancets Misc  1 each by Does not apply route daily     Lumbar Back Brace/Support Pad Misc  1 each by Does not apply route daily as needed. metFORMIN 1000 MG tablet  Commonly known as: GLUCOPHAGE  Take 1 tablet by mouth 2 times daily (with meals)     QUEtiapine 200 MG tablet  Commonly known as: SEROQUEL           * This list has 4 medication(s) that are the same as other medications prescribed for you. Read the directions carefully, and ask your doctor or other care provider to review them with you.                 STOP taking these medications      acetaminophen 325 MG tablet  Commonly known as: Tylenol     blood glucose test strips     glipiZIDE 5 MG tablet  Commonly known as: GLUCOTROL     propranolol 10 MG tablet  Commonly known as:

## 2023-05-05 ENCOUNTER — HOSPITAL ENCOUNTER (EMERGENCY)
Age: 58
Discharge: HOME | End: 2023-05-05
Attending: EMERGENCY MEDICINE
Payer: COMMERCIAL

## 2023-05-05 VITALS
HEART RATE: 74 BPM | RESPIRATION RATE: 16 BRPM | WEIGHT: 131 LBS | DIASTOLIC BLOOD PRESSURE: 95 MMHG | BODY MASS INDEX: 24.11 KG/M2 | TEMPERATURE: 98.2 F | HEIGHT: 62 IN | SYSTOLIC BLOOD PRESSURE: 128 MMHG | OXYGEN SATURATION: 98 %

## 2023-05-05 DIAGNOSIS — T83.021A DISPLACEMENT OF FOLEY CATHETER, INITIAL ENCOUNTER (HCC): Primary | ICD-10-CM

## 2023-05-05 PROCEDURE — 99283 EMERGENCY DEPT VISIT LOW MDM: CPT

## 2023-05-05 ASSESSMENT — ENCOUNTER SYMPTOMS
ABDOMINAL DISTENTION: 0
BACK PAIN: 0
SHORTNESS OF BREATH: 0
COUGH: 0

## 2023-05-05 NOTE — ED PROVIDER NOTES
[address] [facility]     Emergency Department     Faculty Attestation    I performed a history and physical examination of the patient and discussed management with the resident. I reviewed the residents note and agree with the documented findings and plan of care. Any areas of disagreement are noted on the chart. I was personally present for the key portions of any procedures. I have documented in the chart those procedures where I was not present during the key portions. I have reviewed the emergency nurses triage note. I agree with the chief complaint, past medical history, past surgical history, allergies, medications, social and family history as documented unless otherwise noted below. For Physician Assistant/ Nurse Practitioner cases/documentation I have personally evaluated this patient and have completed at least one if not all key elements of the E/M (history, physical exam, and MDM). Additional findings are as noted. I have personally seen and evaluated the patient. I find the patient's history and physical exam are consistent with the NP/PA documentation. I agree with the care provided, treatment rendered, disposition and follow-up plan. Patient concerned that she may have pulled out her Medina catheter has no other complaints such as fever shakes chills or abdominal pain      Critical Care     Al Sharp M.D.   Attending Emergency  Physician            Alice Bernal MD  05/05/23 9047
Weight - Scale   (!) 128/95 98.2 °F (36.8 °C) Oral 74 16 98 % 5' 2\" (1.575 m) 131 lb (59.4 kg)       Physical Exam  HENT:      Nose: Nose normal.      Mouth/Throat:      Mouth: Mucous membranes are moist.   Eyes:      Pupils: Pupils are equal, round, and reactive to light. Cardiovascular:      Rate and Rhythm: Normal rate. Pulses: Normal pulses. Pulmonary:      Effort: Pulmonary effort is normal. No respiratory distress. Abdominal:      General: There is no distension. Palpations: Abdomen is soft. Tenderness: There is no abdominal tenderness. Musculoskeletal:         General: No swelling or tenderness. Cervical back: Normal range of motion. Skin:     General: Skin is warm. Capillary Refill: Capillary refill takes less than 2 seconds. Coloration: Skin is not jaundiced. Neurological:      General: No focal deficit present. Mental Status: She is alert. Psychiatric:         Mood and Affect: Mood normal.         Behavior: Behavior normal.       DIFFERENTIAL  DIAGNOSIS     PLAN (LABS / IMAGING / EKG):  Orders Placed This Encounter   Procedures    Insert/Change Medina Catheter       MEDICATIONS ORDERED:  No orders of the defined types were placed in this encounter. Medical Decision Making  59-year-old female who is here due to dysuria after she feels as though her Medina dislodged after she stepped on it. Patient is afebrile, hemodynamically stable, no acute distress, Medina still draining. We will plan to replace it. We will plan for discharge. Patient urine in the Medina bag is clear, no no gross hematuria. DIAGNOSTIC RESULTS / EMERGENCYDEPARTMENT COURSE / MDM     LABS:  Labs Reviewed - No data to display        RADIOLOGY:  No results found.       EKG      All EKG's are interpreted by the Emergency Department Physicianwho either signs or Co-signs this chart in the absence of a cardiologist.    EMERGENCY DEPARTMENT COURSE:

## 2023-05-05 NOTE — ED NOTES
Patient to ED 45 c/o urinary catheter problem. Pt states she accidentally stepped onto morocho catheter and it dislodge. Denies any other complaints at this time. On arrival, patient A+Ox4, respirations even and unlabored, speaking in complete sentences. Patient's family member at bedside.       Cierra Stubbs RN  05/05/23 1285

## 2023-05-08 RX ORDER — CHOLECALCIFEROL (VITAMIN D3) 25 MCG
TABLET ORAL
Qty: 30 TABLET | Refills: 0 | OUTPATIENT
Start: 2023-05-08

## 2023-05-09 ENCOUNTER — OFFICE VISIT (OUTPATIENT)
Dept: INFECTIOUS DISEASES | Age: 58
End: 2023-05-09
Payer: COMMERCIAL

## 2023-05-09 VITALS
HEART RATE: 128 BPM | HEIGHT: 62 IN | TEMPERATURE: 97.5 F | WEIGHT: 130.4 LBS | BODY MASS INDEX: 24 KG/M2 | SYSTOLIC BLOOD PRESSURE: 130 MMHG | DIASTOLIC BLOOD PRESSURE: 92 MMHG

## 2023-05-09 DIAGNOSIS — N13.30 BILATERAL HYDRONEPHROSIS: ICD-10-CM

## 2023-05-09 DIAGNOSIS — R33.9 INCOMPLETE BLADDER EMPTYING: Primary | ICD-10-CM

## 2023-05-09 DIAGNOSIS — B37.49 CANDIDA UTI: ICD-10-CM

## 2023-05-09 DIAGNOSIS — E11.9 NEW ONSET TYPE 2 DIABETES MELLITUS (HCC): ICD-10-CM

## 2023-05-09 PROCEDURE — 3017F COLORECTAL CA SCREEN DOC REV: CPT | Performed by: INTERNAL MEDICINE

## 2023-05-09 PROCEDURE — 1036F TOBACCO NON-USER: CPT | Performed by: INTERNAL MEDICINE

## 2023-05-09 PROCEDURE — 3080F DIAST BP >= 90 MM HG: CPT | Performed by: INTERNAL MEDICINE

## 2023-05-09 PROCEDURE — 99213 OFFICE O/P EST LOW 20 MIN: CPT | Performed by: INTERNAL MEDICINE

## 2023-05-09 PROCEDURE — 1111F DSCHRG MED/CURRENT MED MERGE: CPT | Performed by: INTERNAL MEDICINE

## 2023-05-09 PROCEDURE — 3075F SYST BP GE 130 - 139MM HG: CPT | Performed by: INTERNAL MEDICINE

## 2023-05-09 PROCEDURE — 3052F HG A1C>EQUAL 8.0%<EQUAL 9.0%: CPT | Performed by: INTERNAL MEDICINE

## 2023-05-09 PROCEDURE — G8427 DOCREV CUR MEDS BY ELIG CLIN: HCPCS | Performed by: INTERNAL MEDICINE

## 2023-05-09 PROCEDURE — 2022F DILAT RTA XM EVC RTNOPTHY: CPT | Performed by: INTERNAL MEDICINE

## 2023-05-09 PROCEDURE — G8420 CALC BMI NORM PARAMETERS: HCPCS | Performed by: INTERNAL MEDICINE

## 2023-05-09 RX ORDER — FLUCONAZOLE 100 MG/1
200 TABLET ORAL DAILY
Qty: 14 TABLET | Refills: 0 | Status: SHIPPED | OUTPATIENT
Start: 2023-05-09 | End: 2023-05-16

## 2023-05-23 ENCOUNTER — HOSPITAL ENCOUNTER (EMERGENCY)
Age: 58
Discharge: HOME | End: 2023-05-23
Attending: EMERGENCY MEDICINE
Payer: COMMERCIAL

## 2023-05-23 VITALS
SYSTOLIC BLOOD PRESSURE: 158 MMHG | HEART RATE: 100 BPM | DIASTOLIC BLOOD PRESSURE: 100 MMHG | OXYGEN SATURATION: 100 % | RESPIRATION RATE: 18 BRPM | TEMPERATURE: 97.8 F

## 2023-05-23 DIAGNOSIS — T83.9XXA FOLEY CATHETER PROBLEM, INITIAL ENCOUNTER (HCC): Primary | ICD-10-CM

## 2023-05-23 PROCEDURE — 51702 INSERT TEMP BLADDER CATH: CPT

## 2023-05-23 PROCEDURE — 99283 EMERGENCY DEPT VISIT LOW MDM: CPT

## 2023-05-23 ASSESSMENT — PAIN - FUNCTIONAL ASSESSMENT: PAIN_FUNCTIONAL_ASSESSMENT: NONE - DENIES PAIN

## 2023-05-24 ASSESSMENT — ENCOUNTER SYMPTOMS
ABDOMINAL PAIN: 0
ABDOMINAL DISTENTION: 0

## 2023-05-24 NOTE — ED NOTES
Morocho catheter of patient removed. New morocho catheter placed.       JHONATAN Sanchez RN  05/23/23 3973

## 2023-05-24 NOTE — ED PROVIDER NOTES
I performed a history and physical examination of the patient and discussed management with the resident. I reviewed the residents note and agree with the documented findings and plan of care. Any areas of disagreement are noted on the chart. I was personally present for the key portions of any procedures. I have documented in the chart those procedures where I was not present during the key portions. I have reviewed the emergency nurses triage note. I agree with the chief complaint, past medical history, past surgical history, allergies, medications, social and family history as documented unless otherwise noted below. Documentation of the HPI, Physical Exam and Medical Decision Making performed by medical students or scribes is based on my personal performance of the HPI, PE and MDM. For Phys Assistant/ Nurse Practitioner cases/documentation I have personally evaluated this patient and have completed at least one if not all key elements of the E/M (history, physical exam, and MDM). I find the patient's history and physical exam are consistent with the NP/PA documentation. I agree with the care provided, treatment rendered, disposition and followup plan. Additional findings are as noted. Cr Blue. Stuart Ragland MD  Attending Emergency  Physician        This patient presented to the emergency room with complaints of urine leaking around her Medina catheter. Patient has a Medina catheter due to history of urinary retention and subsequent hydronephrosis. She denies any fevers or chills. No abdominal pain. No hematuria. No dysuria. On examination the patient is awake and alert. She is cooperative and responsive. A new Medina catheter has been placed and is draining freely without any sediment or hematuria and certainly no urine leaking around the catheter.   The nurse notes that the previous Medina balloon was not fully inflated and is wondering if that may have led to the urination around the

## 2023-05-24 NOTE — ED PROVIDER NOTES
[facility]  ED  Emergency Department Encounter  Emergency Medicine Resident     Pt Sonal Yolie Mota  MRN: [id number]  [place] 1965  Date of evaluation: 23  PCP:  Tena Ling  Note Started: 8:29 PM EDT      CHIEF COMPLAINT       Chief Complaint   Patient presents with    Urinary Catheter     Leaking around insertion site        HISTORY OF PRESENT ILLNESS  (Location/Symptom, Timing/Onset, Context/Setting, Quality, Duration, Modifying Factors, Severity.)      William Sinha is a 61 y.o. female with h/o stroke, CKD, HTN, urianry retention who presents with leaking around her urinary catheter site that began today. Patient states she feels she ripped her Medina catheter when she was straining to poop today. She believes the catheter ripped and has been leaking. Patient states she needs a new urinary catheter. Patient denies dysuria, hematuria, abdominal pain, back pain, fevers, chills. PAST MEDICAL / SURGICAL / SOCIAL / FAMILY HISTORY      has a past medical history of Anxiety, Chronic back pain, Chronic kidney disease, Hypertension, Kidney stone, Mitral valve prolapse, and Stroke (cerebrum) ([facility] [address] has a past surgical history that includes  section; Hysterectomy (); and Tonsillectomy. Social History     Socioeconomic History    Marital status:       Spouse name: Not on file    Number of children: 3    Years of education: Not on file    Highest education level: Not on file   Occupational History    Not on file   Tobacco Use    Smoking status: Never    Smokeless tobacco: Not on file   Substance and Sexual Activity    Alcohol use: No    Drug use: No    Sexual activity: Not Currently     Partners: Male   Other Topics Concern    Not on file   Social History Narrative    Not on file     Social Determinants of Health     Financial Resource Strain: Low Risk     Difficulty of Paying Living Expenses: Not hard at all   Food Insecurity: No Food Insecurity

## 2023-05-24 NOTE — DISCHARGE INSTRUCTIONS
You were seen due to problem with your morocho catheter. The catheter was replaced in the emergency department. You need to keep the morocho catheter below waist level to allow it work. Please return to the ED for any new emergencies.  Otherwise follow up with your primary care physician as needed

## 2023-05-24 NOTE — ED NOTES
..Patient in need of transportation home. RIP contacted Cuca LEZAMA unknown.  # [phone]     [facility] [place]  05/23/23 7139

## 2023-05-24 NOTE — ED NOTES
Pt brought to ED by EMS. Pt has had a morocho catheter in place for issues with urinary retention. Pt states that she has recently noticed urine leaking around the catheter and would possibly like a new one to be placed. No trauma noted to the area. Pt alert and oriented. Pt resting on stretcher in gown.      Abbi Reynoso RN  05/23/23 2049

## 2023-06-22 ENCOUNTER — NURSE ONLY (OUTPATIENT)
Dept: UROLOGY | Age: 58
End: 2023-06-22
Payer: COMMERCIAL

## 2023-06-22 DIAGNOSIS — N31.2 BLADDER ATONY: Primary | ICD-10-CM

## 2023-06-22 DIAGNOSIS — R33.9 URINARY RETENTION: ICD-10-CM

## 2023-06-22 PROCEDURE — 99211 OFF/OP EST MAY X REQ PHY/QHP: CPT | Performed by: SPECIALIST

## 2023-06-22 PROCEDURE — 51702 INSERT TEMP BLADDER CATH: CPT | Performed by: SPECIALIST

## 2023-06-22 PROCEDURE — 51700 IRRIGATION OF BLADDER: CPT | Performed by: SPECIALIST

## 2023-06-22 NOTE — PROGRESS NOTES
Fill and pull procedure  Procedure Date: 6/22/23    Verbal consent obtained from patient prior to procedure. Patient arrived with old catheter in place for fill and pull. Bladder installation of 350 ml of sterile water per gravity until patient felt the sensation of bladder capacity fullness. Morocho ballon deflated and morocho removed without complication. Attempted to void with return of 75ml. New 16F morocho inserted utilizing sterile technique per organization policy placed by José Gomez LPN. 10ml saline balloon inflated with positive returns noted. Morocho secured and attached to right leg. Patient tolerated procedure well and without complications. Encouraged to stay well hydrated. Reviewed catheter care with verbal and written instructions. Verbalized understanding.

## 2023-06-27 ENCOUNTER — HOSPITAL ENCOUNTER (EMERGENCY)
Age: 58
Discharge: HOME | End: 2023-06-27
Attending: EMERGENCY MEDICINE
Payer: COMMERCIAL

## 2023-06-27 VITALS
RESPIRATION RATE: 16 BRPM | BODY MASS INDEX: 25.21 KG/M2 | SYSTOLIC BLOOD PRESSURE: 141 MMHG | HEIGHT: 62 IN | HEART RATE: 111 BPM | TEMPERATURE: 97 F | WEIGHT: 137 LBS | OXYGEN SATURATION: 100 % | DIASTOLIC BLOOD PRESSURE: 99 MMHG

## 2023-06-27 DIAGNOSIS — T83.9XXA PROBLEM WITH FOLEY CATHETER, INITIAL ENCOUNTER (HCC): Primary | ICD-10-CM

## 2023-06-27 PROCEDURE — 99283 EMERGENCY DEPT VISIT LOW MDM: CPT

## 2023-07-03 ENCOUNTER — HOSPITAL ENCOUNTER (EMERGENCY)
Age: 58
Discharge: HOME | End: 2023-07-03
Attending: EMERGENCY MEDICINE
Payer: COMMERCIAL

## 2023-07-03 VITALS
HEART RATE: 101 BPM | BODY MASS INDEX: 25.6 KG/M2 | SYSTOLIC BLOOD PRESSURE: 130 MMHG | TEMPERATURE: 97.8 F | WEIGHT: 139.99 LBS | RESPIRATION RATE: 16 BRPM | DIASTOLIC BLOOD PRESSURE: 101 MMHG | OXYGEN SATURATION: 100 %

## 2023-07-03 DIAGNOSIS — Z46.6 ENCOUNTER FOR FOLEY CATHETER REPLACEMENT: Primary | ICD-10-CM

## 2023-07-03 PROCEDURE — 51702 INSERT TEMP BLADDER CATH: CPT

## 2023-07-03 PROCEDURE — 99283 EMERGENCY DEPT VISIT LOW MDM: CPT

## 2023-07-03 ASSESSMENT — ENCOUNTER SYMPTOMS
EYE REDNESS: 0
COUGH: 0
SHORTNESS OF BREATH: 0
EYE PAIN: 0
BACK PAIN: 0
ABDOMINAL PAIN: 0
RHINORRHEA: 0
WHEEZING: 0
COLOR CHANGE: 0
PHOTOPHOBIA: 0
NAUSEA: 0
VOMITING: 0
SORE THROAT: 0

## 2023-07-03 ASSESSMENT — PAIN - FUNCTIONAL ASSESSMENT: PAIN_FUNCTIONAL_ASSESSMENT: NONE - DENIES PAIN

## 2023-07-03 NOTE — ED NOTES
Pt arrived to ED per self C/O her morocho catheter disconnecting. Catheter appears to have disconnected at joint between tubing and bag. Pt taped connection point. Pt denies pain, states her catheter was placed on the 22nd of June. Pt ambulatory, RR even and unlabored, A + O x 4. Call light within reach.      Benita Simon RN  07/03/23 3152

## 2023-07-03 NOTE — ED PROVIDER NOTES
[address] [place] [address] ED  Emergency Department Encounter  Emergency Medicine Resident     Pt Eezquiel Manjit Walton  MRN: [id number]  [address] [facility] 1965  Date of evaluation: 7/3/23  PCP:  ASHOK Tuttle CNP  Note Started: 10:05 AM EDT      CHIEF COMPLAINT       Chief Complaint   Patient presents with    Urinary Catheter       HISTORY OF PRESENT ILLNESS  (Location/Symptom, Timing/Onset, Context/Setting, Quality, Duration, Modifying Factors, Severity.)      Shyann Evangelista is a 61 y.o. female who presents with Medina catheter dislodgment. Patient has a Medina catheter for overflow incontinence. She has had it for over a month now. States that she frequently sees her her urologist.  Her next plan is a . Patient states that the Medina catheter drain keeps coming off. She would like her Medina catheter replaced. States that it continues to drain no blood in the Medina catheter. No fevers chills abdominal pain at home. PAST MEDICAL / SURGICAL / SOCIAL / FAMILY HISTORY      has a past medical history of Anxiety, Chronic back pain, Chronic kidney disease, Hypertension, Kidney stone, Mitral valve prolapse, and Stroke (cerebrum) ([address] has a past surgical history that includes  section; Hysterectomy (); and Tonsillectomy. Social History     Socioeconomic History    Marital status:       Spouse name: Not on file    Number of children: 3    Years of education: Not on file    Highest education level: Not on file   Occupational History    Not on file   Tobacco Use    Smoking status: Never    Smokeless tobacco: Not on file   Substance and Sexual Activity    Alcohol use: No    Drug use: No    Sexual activity: Not Currently     Partners: Male   Other Topics Concern    Not on file   Social History Narrative    Not on file     Social Determinants of Health     Financial Resource Strain: Low Risk     Difficulty of Paying Living Expenses: Not hard at all   Food Insecurity: No

## 2023-07-20 ENCOUNTER — TELEPHONE (OUTPATIENT)
Dept: UROLOGY | Age: 58
End: 2023-07-20

## 2023-07-20 NOTE — TELEPHONE ENCOUNTER
Pt called in stating she can not make it to her 7/28/23 appt and would need to r/s next available appt with dr Jim Meyers 8/25/23 pt states she can not wait that long due to need cath changed

## 2023-08-01 ENCOUNTER — HOSPITAL ENCOUNTER (EMERGENCY)
Age: 58
Discharge: HOME | End: 2023-08-01
Attending: EMERGENCY MEDICINE
Payer: COMMERCIAL

## 2023-08-01 VITALS
WEIGHT: 146.61 LBS | BODY MASS INDEX: 26.98 KG/M2 | RESPIRATION RATE: 15 BRPM | SYSTOLIC BLOOD PRESSURE: 168 MMHG | OXYGEN SATURATION: 100 % | TEMPERATURE: 98.2 F | DIASTOLIC BLOOD PRESSURE: 113 MMHG | HEART RATE: 103 BPM | HEIGHT: 62 IN

## 2023-08-01 DIAGNOSIS — Z46.6 ENCOUNTER FOR FOLEY CATHETER REPLACEMENT: Primary | ICD-10-CM

## 2023-08-01 PROCEDURE — 51702 INSERT TEMP BLADDER CATH: CPT

## 2023-08-01 PROCEDURE — 99283 EMERGENCY DEPT VISIT LOW MDM: CPT

## 2023-08-01 ASSESSMENT — PAIN - FUNCTIONAL ASSESSMENT: PAIN_FUNCTIONAL_ASSESSMENT: NONE - DENIES PAIN

## 2023-08-02 NOTE — ED NOTES
Patient does not want to wait for medicaid cab. Patient states she will walk home.       Cammy Burton [facility]  08/02/23 0012

## 2023-08-02 NOTE — DISCHARGE INSTRUCTIONS
Please follow up with your primary care provider in the next few days. Please have your morocho catheter replaced as directed by your primary care provider. PLEASE RETURN TO THE EMERGENCY DEPARTMENT IMMEDIATELY for worsening symptoms, inability to urinate, worsening of blood in your urine, or if you develop any concerning symptoms such as: high fever not relieved by acetaminophen (Tylenol) and/or ibuprofen (Motrin / Advil), chills, shortness of breath, chest pain, feeling of your heart fluttering or racing, persistent nausea and/or vomiting, vomiting up blood, blood in your stool, loss of consciousness, numbness, weakness or tingling in the arms or legs or change in color of the extremities, changes in mental status, persistent headache, blurry vision, loss of bladder / bowel.

## 2023-08-02 NOTE — ED TRIAGE NOTES
Pt comes to ED with c/o morocho replacement. Pt states morocho was placed four months ago for urinary retention, is seen every month for replacement, last replacement was about a month ago, bag broke about an hour ago and wants the whole morocho to be replaced. Pt denies fever, nausea, vomiting, diarrhea, chest pain, and SOB. Pt a/o x4, resting on stretcher, RR even and non labored, call light within reach.

## 2023-08-02 NOTE — ED PROVIDER NOTES
[facility] ED  Emergency Department Encounter  Emergency Medicine Resident     Pt Pia Ledezma  MRN: [id number]  [address] [facility] [place] 1965  Date of evaluation: 23  PCP:  ASHOK Moreno CNP  Note Started: 10:51 PM EDT      CHIEF COMPLAINT       Chief Complaint   Patient presents with    Urinary Catheter       HISTORY OF PRESENT ILLNESS  (Location/Symptom, Timing/Onset, Context/Setting, Quality, Duration, Modifying Factors, Severity.)      Jania Palacios is a 60 y.o. female who presents with need for urinary catheter replacement. Patient states has been wearing a urinary catheter for the past several months, has been told to change her catheter every month and she is due for replacement. She states her catheter bag broke open earlier today and she needs a bag replacement as well. She denies any medical complaints or symptoms at this time, including fevers, chills, abdominal pain, nausea, vomiting, diarrhea, flank pain, change in urine color or consistency. PAST MEDICAL / SURGICAL / SOCIAL / FAMILY HISTORY      has a past medical history of Anxiety, Chronic back pain, Chronic kidney disease, Hypertension, Kidney stone, Mitral valve prolapse, and Stroke (cerebrum) ([address] has a past surgical history that includes  section; Hysterectomy (); and Tonsillectomy. Social History     Socioeconomic History    Marital status:       Spouse name: Not on file    Number of children: 3    Years of education: Not on file    Highest education level: Not on file   Occupational History    Not on file   Tobacco Use    Smoking status: Never    Smokeless tobacco: Not on file   Substance and Sexual Activity    Alcohol use: No    Drug use: No    Sexual activity: Not Currently     Partners: Male   Other Topics Concern    Not on file   Social History Narrative    Not on file     Social Determinants of Health     Financial Resource Strain: Low Risk     Difficulty of Paying were made to edit the dictations but occasionally words are mis-transcribed.)       Jae Sharpe MD  Resident  08/05/23 4423

## 2023-08-05 ASSESSMENT — ENCOUNTER SYMPTOMS
VOMITING: 0
NAUSEA: 0
DIARRHEA: 0
ABDOMINAL PAIN: 0
SHORTNESS OF BREATH: 0

## 2023-08-07 ENCOUNTER — HOSPITAL ENCOUNTER (EMERGENCY)
Age: 58
Discharge: HOME | End: 2023-08-07
Attending: EMERGENCY MEDICINE
Payer: COMMERCIAL

## 2023-08-07 VITALS
DIASTOLIC BLOOD PRESSURE: 104 MMHG | HEART RATE: 96 BPM | SYSTOLIC BLOOD PRESSURE: 146 MMHG | BODY MASS INDEX: 26.85 KG/M2 | WEIGHT: 146.83 LBS | OXYGEN SATURATION: 98 % | TEMPERATURE: 97.4 F | RESPIRATION RATE: 18 BRPM

## 2023-08-07 DIAGNOSIS — T83.9XXA COMPLICATION OF FOLEY CATHETER, INITIAL ENCOUNTER (HCC): Primary | ICD-10-CM

## 2023-08-07 PROCEDURE — 99282 EMERGENCY DEPT VISIT SF MDM: CPT

## 2023-08-07 ASSESSMENT — ENCOUNTER SYMPTOMS
WHEEZING: 0
SINUS PRESSURE: 0
DIARRHEA: 0
SORE THROAT: 0
ABDOMINAL PAIN: 0
VOMITING: 0
BACK PAIN: 0
NAUSEA: 0
CONSTIPATION: 0
SHORTNESS OF BREATH: 0

## 2023-08-07 NOTE — ED PROVIDER NOTES
[address] [place] [address] ED  Emergency Department Encounter  Emergency Medicine Resident     Pt Aydin Pierson  MRN: [id number]  [address] [facility] [address] [place] 1965  Date of evaluation: 23  PCP:  ASHOK Felix CNP  Note Started: 4:35 PM EDT      CHIEF COMPLAINT       Chief Complaint   Patient presents with    Other     Pt states morocho tubing s leaking       HISTORY OF PRESENT ILLNESS  (Location/Symptom, Timing/Onset, Context/Setting, Quality, Duration, Modifying Factors, Severity.)      Jagdeep Keith is a 60 y.o. female who presents with Morocho catheter dysfunction. Patient states that he dropped her Morocho catheter bag and broke the plastic reservoir before arrival.  Patient also states that she fractured the bag and tubing. She denies any problems with the internal part of the catheter. Patient denies any fevers or chills. She denies any urinary complaints. Patient has been seen multiple times in the past for similar issues with broken Morocho catheters. She denies any other complaints at this time. PAST MEDICAL / SURGICAL / SOCIAL / FAMILY HISTORY      has a past medical history of Anxiety, Chronic back pain, Chronic kidney disease, Hypertension, Kidney stone, Mitral valve prolapse, and Stroke (cerebrum) ([address] has a past surgical history that includes  section; Hysterectomy (); and Tonsillectomy. Social History     Socioeconomic History    Marital status:       Spouse name: Not on file    Number of children: 3    Years of education: Not on file    Highest education level: Not on file   Occupational History    Not on file   Tobacco Use    Smoking status: Never    Smokeless tobacco: Not on file   Substance and Sexual Activity    Alcohol use: No    Drug use: No    Sexual activity: Not Currently     Partners: Male   Other Topics Concern    Not on file   Social History Narrative    Not on file     Social Determinants of Health     Financial Resource Strain: Low Risk
the bag reveals no obvious damage to the new bag. Urine appears to be draining freely. Impression: Medina catheter in Medina bag change. I suspect there is an element of intentional damage to her urinary system. The areas that she indicates were damaged when she dropped the bag do not seem to be likely to be broken under the circumstances that she described. Plan: Patient was discharged with instructions to try to be more careful with her urinary drainage system and to follow-up with the urologist as needed.      Romero Lion MD  08/07/23 7132

## 2023-08-07 NOTE — DISCHARGE INSTRUCTIONS
You were seen in the emergency department for broken Medina catheter bag. The Medina was replaced. Please follow-up with your scheduled providers. Please return to the emergency department for any new or worsening symptoms.

## 2023-08-08 ENCOUNTER — HOSPITAL ENCOUNTER (OUTPATIENT)
Dept: GENERAL RADIOLOGY | Age: 58
Discharge: HOME | End: 2023-08-10
Payer: COMMERCIAL

## 2023-08-08 ENCOUNTER — HOSPITAL ENCOUNTER (OUTPATIENT)
Age: 58
Discharge: HOME | End: 2023-08-10
Payer: COMMERCIAL

## 2023-08-08 DIAGNOSIS — R52 PAIN: ICD-10-CM

## 2023-08-08 PROCEDURE — 73030 X-RAY EXAM OF SHOULDER: CPT

## 2023-09-08 ENCOUNTER — NURSE ONLY (OUTPATIENT)
Dept: UROLOGY | Age: 58
End: 2023-09-08
Payer: COMMERCIAL

## 2023-09-08 DIAGNOSIS — R33.9 URINARY RETENTION: ICD-10-CM

## 2023-09-08 DIAGNOSIS — N31.2 BLADDER ATONY: Primary | ICD-10-CM

## 2023-09-08 PROCEDURE — 51702 INSERT TEMP BLADDER CATH: CPT | Performed by: UROLOGY

## 2023-09-08 NOTE — PROGRESS NOTES
Morocho Change and Insertion Procedure:    Placement Date: 23    Verbal consent obtained from patient prior to procedure. Patient arrived with old catheter in place, balloon deflated and was removed without complication. Lidocaine 2% 100 mg Jelly inserted into urethra. New 16F morocho inserted utilizing sterile technique per organization policy placed by Chilango Godinez LPN. 10ml sterile water balloon inflated, attached to right leg with return of 30cc urine. Morocho secured. Patient tolerated procedure well and without complications. Reviewed morocho care. Verbalized understanding.      TIMEOUT  Correct patient (patient name and ) yes  Correct Site yes  Correct Procedure yes     PROCEDURE DONE: yes

## 2023-09-15 ENCOUNTER — HOSPITAL ENCOUNTER (EMERGENCY)
Age: 58
Discharge: HOME | End: 2023-09-15
Attending: EMERGENCY MEDICINE
Payer: COMMERCIAL

## 2023-09-15 VITALS
DIASTOLIC BLOOD PRESSURE: 77 MMHG | SYSTOLIC BLOOD PRESSURE: 108 MMHG | HEART RATE: 100 BPM | BODY MASS INDEX: 27.66 KG/M2 | RESPIRATION RATE: 16 BRPM | OXYGEN SATURATION: 100 % | WEIGHT: 151.24 LBS | TEMPERATURE: 97.5 F

## 2023-09-15 DIAGNOSIS — N30.01 ACUTE CYSTITIS WITH HEMATURIA: ICD-10-CM

## 2023-09-15 DIAGNOSIS — T83.9XXA PROBLEM WITH FOLEY CATHETER, INITIAL ENCOUNTER (HCC): Primary | ICD-10-CM

## 2023-09-15 LAB
BACTERIA URNS QL MICRO: ABNORMAL
BILIRUB UR QL STRIP: NEGATIVE
CLARITY UR: ABNORMAL
COLOR UR: YELLOW
EPI CELLS #/AREA URNS HPF: ABNORMAL /HPF (ref 0–5)
GLUCOSE UR STRIP-MCNC: NEGATIVE MG/DL
HGB UR QL STRIP.AUTO: ABNORMAL
KETONES UR STRIP-MCNC: NEGATIVE MG/DL
LEUKOCYTE ESTERASE UR QL STRIP: ABNORMAL
NITRITE UR QL STRIP: NEGATIVE
PH UR STRIP: 6 [PH] (ref 5–8)
PROT UR STRIP-MCNC: ABNORMAL MG/DL
RBC #/AREA URNS HPF: ABNORMAL /HPF (ref 0–4)
SP GR UR STRIP: 1.01 (ref 1–1.03)
UROBILINOGEN UR STRIP-ACNC: NORMAL EU/DL (ref 0–1)
WBC #/AREA URNS HPF: ABNORMAL /HPF (ref 0–5)

## 2023-09-15 PROCEDURE — 6370000000 HC RX 637 (ALT 250 FOR IP)

## 2023-09-15 PROCEDURE — 81001 URINALYSIS AUTO W/SCOPE: CPT

## 2023-09-15 PROCEDURE — 51702 INSERT TEMP BLADDER CATH: CPT

## 2023-09-15 PROCEDURE — 87086 URINE CULTURE/COLONY COUNT: CPT

## 2023-09-15 PROCEDURE — 99283 EMERGENCY DEPT VISIT LOW MDM: CPT

## 2023-09-15 RX ORDER — CEPHALEXIN 500 MG/1
500 CAPSULE ORAL 2 TIMES DAILY
Qty: 14 CAPSULE | Refills: 0 | Status: SHIPPED | OUTPATIENT
Start: 2023-09-15 | End: 2023-09-22

## 2023-09-15 RX ORDER — CEPHALEXIN 500 MG/1
500 CAPSULE ORAL ONCE
Status: COMPLETED | OUTPATIENT
Start: 2023-09-15 | End: 2023-09-15

## 2023-09-15 RX ADMIN — CEPHALEXIN 500 MG: 500 CAPSULE ORAL at 16:51

## 2023-09-15 ASSESSMENT — ENCOUNTER SYMPTOMS
ABDOMINAL PAIN: 1
NAUSEA: 0
VOMITING: 0

## 2023-09-15 NOTE — ED PROVIDER NOTES
[facility] ED  Emergency Department Encounter  Emergency Medicine Resident     Pt Greg Ashley  MRN: [id number]  [address] [facility] [place] 1965  Date of evaluation: 9/15/23  PCP:  Nicole Mace, ASHOK - CNP  Note Started: 3:22 PM EDT      CHIEF COMPLAINT       Chief Complaint   Patient presents with    Urinary Catheter     Medina bag has a hole in it       HISTORY OF PRESENT ILLNESS  (Location/Symptom, Timing/Onset, Context/Setting, Quality, Duration, Modifying Factors, Severity.)      Warner Lozoya is a 60 y.o. female with history of urinary retention with chronic Medina who presents with leaking from her Medina bag. She is unable to find the hole but thinks that there must be one. She is also complaining of some dysuria and some lower abdominal pain. She states this feels similar to when she has had urinary tract infections in the past.  She denies any nausea or vomiting. No fevers or chills. Her next appointment with urology is on . PAST MEDICAL / SURGICAL / SOCIAL / FAMILY HISTORY      has a past medical history of Anxiety, Chronic back pain, Chronic kidney disease, Hypertension, Kidney stone, Mitral valve prolapse, and Stroke (cerebrum) ([address] has a past surgical history that includes  section; Hysterectomy (); and Tonsillectomy. Social History     Socioeconomic History    Marital status:       Spouse name: Not on file    Number of children: 3    Years of education: Not on file    Highest education level: Not on file   Occupational History    Not on file   Tobacco Use    Smoking status: Never    Smokeless tobacco: Not on file   Substance and Sexual Activity    Alcohol use: No    Drug use: No    Sexual activity: Not Currently     Partners: Male   Other Topics Concern    Not on file   Social History Narrative    Not on file     Social Determinants of Health     Financial Resource Strain: Low Risk  (4/10/2023)    Overall Financial Resource Strain (CARDIA) Left Ear: External ear normal.      Nose: Nose normal.      Mouth/Throat:      Mouth: Mucous membranes are moist.   Eyes:      Extraocular Movements: Extraocular movements intact. Conjunctiva/sclera: Conjunctivae normal.   Cardiovascular:      Rate and Rhythm: Normal rate. Pulmonary:      Effort: Pulmonary effort is normal.   Abdominal:      General: Abdomen is flat. There is no distension. Palpations: Abdomen is soft. Tenderness: There is no abdominal tenderness. There is no right CVA tenderness, left CVA tenderness, guarding or rebound. Musculoskeletal:         General: Normal range of motion. Cervical back: Normal range of motion. Skin:     Findings: No bruising, erythema, lesion or rash. Neurological:      General: No focal deficit present. Mental Status: She is alert and oriented to person, place, and time. DDX/DIAGNOSTIC RESULTS / EMERGENCY DEPARTMENT COURSE / MDM     Medical Decision Making  69-year-old female with history of urinary retention with chronic Medina who presents with leaking from her Medina bag. She is unable to find the hole but thinks that there must be one. She is also complaining of some dysuria and some lower abdominal pain. She states this feels similar to when she has had urinary tract infections in the past.  She denies any nausea or vomiting. No fevers or chills. Her next appointment with urology is on October 6th. Patient is nontoxic-appearing. Vital signs stable. Afebrile. Abdomen soft, nontender, nondistended. No rebound or guarding. No CVA tenderness bilaterally. Medina bag leaking into the plastic bag that patient brought in. No gross hematuria noted. Will replace Medina and check urinalysis. Anticipate discharge. Amount and/or Complexity of Data Reviewed  External Data Reviewed: notes. Labs: ordered. Decision-making details documented in ED Course.     Critical Care  Total time providing critical care: 0 minutes        EMERGENCY

## 2023-09-15 NOTE — ED NOTES
Pt is A+Ox4  Pt presents with a hole in morocho bag  Pt states she put her morocho in her purse and she has a hole in the foey bag  Pt ambulates with a steady gait  All questions answered and needs met  Call light within reach     Irene Lion LPN  28/98/96 5389

## 2023-09-15 NOTE — DISCHARGE INSTRUCTIONS
You were seen in the emergency department for concern for a hole in your Medina as it was leaking. You were also concerned you had a urinary tract infection. Your vital signs were stable. No fever noted. Your Medina was replaced. Urinalysis does show urinary tract infection. We gave you your first dose of Keflex in the emergency department. We will discharge on Keflex. Please take as prescribed to complete the entire antibiotic course. Please follow-up with your primary care provider in the next week given recent ER visit. Please continue to follow with your urologist.  Please return to the emergency department if you develop any nausea, vomiting, fevers, or chills.

## 2023-09-15 NOTE — ED TRIAGE NOTES
Pt has a hole in her morocho bag , she states she thinks its due to putting it in her purse Azithromycin Counseling:  I discussed with the patient the risks of azithromycin including but not limited to GI upset, allergic reaction, drug rash, diarrhea, and yeast infections.

## 2023-09-17 LAB
MICROORGANISM SPEC CULT: NORMAL
SPECIMEN DESCRIPTION: NORMAL

## 2023-09-18 NOTE — PROGRESS NOTES
Reviewed patient's urine culture - culture positive for \"several types of bacteria\". Patient was discharged on Cephalexin. No changes made to antibiotic regimen.      Nurys Reilly PharmD  PGY2 Pharmacy Resident 9/18/2023 12:29 PM

## 2023-10-18 ENCOUNTER — HOSPITAL ENCOUNTER (EMERGENCY)
Age: 58
Discharge: HOME | End: 2023-10-18
Attending: EMERGENCY MEDICINE
Payer: COMMERCIAL

## 2023-10-18 VITALS
SYSTOLIC BLOOD PRESSURE: 119 MMHG | RESPIRATION RATE: 16 BRPM | BODY MASS INDEX: 30.43 KG/M2 | HEIGHT: 62 IN | WEIGHT: 165.34 LBS | TEMPERATURE: 98.8 F | OXYGEN SATURATION: 96 % | HEART RATE: 119 BPM | DIASTOLIC BLOOD PRESSURE: 87 MMHG

## 2023-10-18 DIAGNOSIS — T83.9XXA PROBLEM WITH FOLEY CATHETER, INITIAL ENCOUNTER (HCC): Primary | ICD-10-CM

## 2023-10-18 PROCEDURE — 99282 EMERGENCY DEPT VISIT SF MDM: CPT

## 2023-10-18 ASSESSMENT — PAIN - FUNCTIONAL ASSESSMENT: PAIN_FUNCTIONAL_ASSESSMENT: NONE - DENIES PAIN

## 2023-10-18 NOTE — ED PROVIDER NOTES
[facility] ED  Emergency Department Encounter  Emergency Medicine Resident     Pt Nehemiah Gesherine  MRN: [id number]  [address] [facility] [place] 1965  Date of evaluation: 10/18/23  PCP:  ASHOK Altamirano CNP  Note Started: 1:35 PM EDT      CHIEF COMPLAINT       Chief Complaint   Patient presents with    Medina Care       HISTORY OF PRESENT ILLNESS  (Location/Symptom, Timing/Onset, Context/Setting, Quality, Duration, Modifying Factors, Severity.)      Deny Crum is a 60 y.o. female who presents with concern for leak in her Medina bag. Patient reports that she noticed urine leaking out of the Medina bag this morning and was concerned that it may have been punctured as she keeps it in her purse. She denies any other abdominal pain, nausea, vomiting, fever, chills. She reports the Medina bag was placed due to chronic incontinence and that she has it replaced monthly. She reports that she has an appointment to see her urologist to have it replaced this month. She denies any other complaints. PAST MEDICAL / SURGICAL / SOCIAL / FAMILY HISTORY      has a past medical history of Anxiety, Chronic back pain, Chronic kidney disease, Hypertension, Kidney stone, Mitral valve prolapse, and Stroke (cerebrum) ([address] has a past surgical history that includes  section; Hysterectomy (); and Tonsillectomy. Social History     Socioeconomic History    Marital status:       Spouse name: Not on file    Number of children: 3    Years of education: Not on file    Highest education level: Not on file   Occupational History    Not on file   Tobacco Use    Smoking status: Never    Smokeless tobacco: Not on file   Substance and Sexual Activity    Alcohol use: No    Drug use: No    Sexual activity: Not Currently     Partners: Male   Other Topics Concern    Not on file   Social History Narrative    Not on file     Social Determinants of Health     Financial Resource Strain: Low Risk  (4/10/2023) Skin is warm and dry. Neurological:      General: No focal deficit present. Mental Status: She is alert and oriented to person, place, and time. DDX/DIAGNOSTIC RESULTS / EMERGENCY DEPARTMENT COURSE / MDM     Medical Decision Making  Patient is a 59-year-old female presenting due to concerns for puncture in her Medina catheter bag. Upon examination she denies any other complaints. After examining patient's Medina catheter bag it appears that she did not have the toggle switch to the off position on the outward flow for the Medina catheter. This was explained to the patient and patient was provided with a clean bag in which to place the Medina catheter. It was once again reiterated that she should keep the catheter in a more protective bag and pay attention to the positioning of the outflow tract. Patient has presented here for similar problems in the past.  Patient does seem reliable with her follow-up with urology. Patient is not currently reporting any abdominal pain, nausea, vomiting, fever, chills and although her urine is slightly malodorous she is likely chronically colonized with multiple urinary bacteria. As she is asymptomatic we will have her follow-up with her urologist and return if symptoms develop. CONSULTS:  None    CRITICAL CARE:  There was significant risk of life threatening deterioration of patient's condition requiring my direct management. Critical care time 0 minutes, excluding any documented procedures. FINAL IMPRESSION      1. Problem with Medina catheter, initial encounter ([address])          DISPOSITION / PLAN     DISPOSITION Decision To Discharge 10/18/2023 01:36:22 PM      PATIENT REFERRED TO:  No follow-up provider specified.     DISCHARGE MEDICATIONS:  Discharge Medication List as of 10/18/2023  1:52 PM          Trish Lopez MD  Emergency Medicine Resident    (Please note that portions of thisnote were completed with a voice recognition program.  Efforts were

## 2023-10-18 NOTE — ED NOTES
Patient presents to ED for \"bag leaking\". Patient brought to [facility] with morocho inside 2 grocery bags and then inside a patient belonging bag. Patient states that her bag is leaking. Catheter set removed from bags and determined that the plug was open. Patient a/o x 4 with even non labored respirations, speaking in complete sentences, NAD noted at this time. Catheter care teaching performed by Dr Valentin Burroughs and Becca Gupta, TAO.      Shayna Govea, TALN  98/67/59 [id number]

## 2023-10-18 NOTE — DISCHARGE INSTRUCTIONS
You were evaluated due to a problem with your Medina bag. Please remember to close the spigot for the Medina bag properly prior to folding the bag up and putting it in your purse. Please protect the bag from any punctures and avoid placing it in your purse if possible. We recommend that she find some protective bag to put around the bag to protect it from any punctures. Please see your urologist on your scheduled appointment date. Please return to ED if develop any fever, chills, nausea, vomiting, if your Medina becomes dislodged, abdominal pain or for any other concern.

## 2023-10-18 NOTE — ED TRIAGE NOTES
Patient presented to the ED today for urinary morocho care. Patient states that she has a hole in morocho. Patient denies having any complaints or concerns today.

## 2023-10-27 ENCOUNTER — NURSE ONLY (OUTPATIENT)
Dept: UROLOGY | Age: 58
End: 2023-10-27
Payer: COMMERCIAL

## 2023-10-27 ENCOUNTER — TELEPHONE (OUTPATIENT)
Dept: UROLOGY | Age: 58
End: 2023-10-27

## 2023-10-27 DIAGNOSIS — N31.2 BLADDER ATONY: ICD-10-CM

## 2023-10-27 DIAGNOSIS — R33.9 URINARY RETENTION: Primary | ICD-10-CM

## 2023-10-27 PROCEDURE — 51702 INSERT TEMP BLADDER CATH: CPT | Performed by: UROLOGY

## 2023-10-27 NOTE — TELEPHONE ENCOUNTER
Patient stated she just left her OV and tape was not applied to her leg. She would like a call as soon as possible and can be reached at [phone]. Okay to leave a message.

## 2023-10-27 NOTE — PROGRESS NOTES
Morocho Change and Insertion Procedure:    Placement Date: 10/27/23    Verbal consent obtained from patient prior to procedure. Patient arrived with old catheter in place, balloon deflated and was removed without complication. Lidocaine 2% 100 mg Jelly inserted into urethra. New 16F morocho inserted utilizing sterile technique per organization policy placed by Leelee Mathis LPN. 10ml sterile water balloon inflated, attached to right leg with return of 50cc urine. Morocho secured. Patient tolerated procedure well and without complications. Reviewed morocho care. Verbalized understanding.      TIMEOUT  Correct patient (patient name and ) yes  Correct Site yes  Correct Procedure yes     PROCEDURE DONE: yes

## 2023-12-03 ENCOUNTER — HOSPITAL ENCOUNTER (EMERGENCY)
Age: 58
Discharge: HOME | End: 2023-12-03
Attending: EMERGENCY MEDICINE
Payer: COMMERCIAL

## 2023-12-03 VITALS
RESPIRATION RATE: 20 BRPM | TEMPERATURE: 99.2 F | OXYGEN SATURATION: 99 % | DIASTOLIC BLOOD PRESSURE: 58 MMHG | HEART RATE: 103 BPM | SYSTOLIC BLOOD PRESSURE: 139 MMHG

## 2023-12-03 DIAGNOSIS — N34.2 INFECTIVE URETHRITIS: Primary | ICD-10-CM

## 2023-12-03 DIAGNOSIS — Z97.8 CHRONIC INDWELLING FOLEY CATHETER: ICD-10-CM

## 2023-12-03 LAB
ANION GAP SERPL CALCULATED.3IONS-SCNC: 15 MMOL/L (ref 9–17)
BACTERIA URNS QL MICRO: ABNORMAL
BASOPHILS # BLD: 0 K/UL (ref 0–0.2)
BASOPHILS NFR BLD: 0 % (ref 0–2)
BILIRUB UR QL STRIP: NEGATIVE
BUN SERPL-MCNC: 12 MG/DL (ref 6–20)
CALCIUM SERPL-MCNC: 9.2 MG/DL (ref 8.6–10.4)
CASTS #/AREA URNS LPF: ABNORMAL /LPF (ref 0–8)
CHLORIDE SERPL-SCNC: 100 MMOL/L (ref 98–107)
CLARITY UR: CLEAR
CO2 SERPL-SCNC: 21 MMOL/L (ref 20–31)
COLOR UR: YELLOW
CREAT SERPL-MCNC: 0.8 MG/DL (ref 0.5–0.9)
EOSINOPHIL # BLD: 0.33 K/UL (ref 0–0.4)
EOSINOPHILS RELATIVE PERCENT: 3 % (ref 1–4)
EPI CELLS #/AREA URNS HPF: ABNORMAL /HPF (ref 0–5)
ERYTHROCYTE [DISTWIDTH] IN BLOOD BY AUTOMATED COUNT: 13 % (ref 11.8–14.4)
GFR SERPL CREATININE-BSD FRML MDRD: >60 ML/MIN/1.73M2
GLUCOSE SERPL-MCNC: 209 MG/DL (ref 70–99)
GLUCOSE UR STRIP-MCNC: NEGATIVE MG/DL
HCT VFR BLD AUTO: 40.6 % (ref 36.3–47.1)
HGB BLD-MCNC: 12.8 G/DL (ref 11.9–15.1)
HGB UR QL STRIP.AUTO: ABNORMAL
IMM GRANULOCYTES # BLD AUTO: 0 K/UL (ref 0–0.3)
IMM GRANULOCYTES NFR BLD: 0 %
KETONES UR STRIP-MCNC: NEGATIVE MG/DL
LEUKOCYTE ESTERASE UR QL STRIP: ABNORMAL
LYMPHOCYTES NFR BLD: 5.33 K/UL (ref 1–4.8)
LYMPHOCYTES RELATIVE PERCENT: 49 % (ref 24–44)
MCH RBC QN AUTO: 27.6 PG (ref 25.2–33.5)
MCHC RBC AUTO-ENTMCNC: 31.5 G/DL (ref 28.4–34.8)
MCV RBC AUTO: 87.5 FL (ref 82.6–102.9)
MONOCYTES NFR BLD: 0.44 K/UL (ref 0.1–0.8)
MONOCYTES NFR BLD: 4 % (ref 1–7)
MORPHOLOGY: NORMAL
NEUTROPHILS NFR BLD: 44 % (ref 36–66)
NEUTS SEG NFR BLD: 4.8 K/UL (ref 1.8–7.7)
NITRITE UR QL STRIP: NEGATIVE
NRBC BLD-RTO: 0 PER 100 WBC
PH UR STRIP: 6 [PH] (ref 5–8)
PLATELET # BLD AUTO: 317 K/UL (ref 138–453)
PMV BLD AUTO: 10.2 FL (ref 8.1–13.5)
POTASSIUM SERPL-SCNC: 4 MMOL/L (ref 3.7–5.3)
PROT UR STRIP-MCNC: NEGATIVE MG/DL
RBC # BLD AUTO: 4.64 M/UL (ref 3.95–5.11)
RBC #/AREA URNS HPF: ABNORMAL /HPF (ref 0–4)
SODIUM SERPL-SCNC: 136 MMOL/L (ref 135–144)
SP GR UR STRIP: 1.01 (ref 1–1.03)
UROBILINOGEN UR STRIP-ACNC: NORMAL EU/DL (ref 0–1)
WBC #/AREA URNS HPF: ABNORMAL /HPF (ref 0–5)
WBC OTHER # BLD: 10.9 K/UL (ref 3.5–11.3)

## 2023-12-03 PROCEDURE — 87186 SC STD MICRODIL/AGAR DIL: CPT

## 2023-12-03 PROCEDURE — 87086 URINE CULTURE/COLONY COUNT: CPT

## 2023-12-03 PROCEDURE — 85025 COMPLETE CBC W/AUTO DIFF WBC: CPT

## 2023-12-03 PROCEDURE — 81001 URINALYSIS AUTO W/SCOPE: CPT

## 2023-12-03 PROCEDURE — 87077 CULTURE AEROBIC IDENTIFY: CPT

## 2023-12-03 PROCEDURE — 6370000000 HC RX 637 (ALT 250 FOR IP)

## 2023-12-03 PROCEDURE — 80048 BASIC METABOLIC PNL TOTAL CA: CPT

## 2023-12-03 PROCEDURE — 99283 EMERGENCY DEPT VISIT LOW MDM: CPT

## 2023-12-03 RX ORDER — CEFUROXIME AXETIL 500 MG/1
500 TABLET ORAL ONCE
Status: COMPLETED | OUTPATIENT
Start: 2023-12-03 | End: 2023-12-03

## 2023-12-03 RX ORDER — CEFUROXIME AXETIL 500 MG/1
500 TABLET ORAL 2 TIMES DAILY
Qty: 14 TABLET | Refills: 0 | Status: SHIPPED | OUTPATIENT
Start: 2023-12-03 | End: 2023-12-10

## 2023-12-03 RX ADMIN — CEFUROXIME AXETIL 500 MG: 500 TABLET ORAL at 23:18

## 2023-12-04 ENCOUNTER — TELEPHONE (OUTPATIENT)
Dept: UROLOGY | Age: 58
End: 2023-12-04

## 2023-12-04 NOTE — TELEPHONE ENCOUNTER
Patient needs a 4 week cath change and first opening is in February.  Pt had a cath change at [facility] 12/03, please advise and call pt at [phone], thank you

## 2023-12-04 NOTE — DISCHARGE INSTRUCTIONS
-You were seen and evaluated by emergency medicine physicians at [facility].    -Please follow-up with your primary care physician and/or with the referrals to specialist.    -You were diagnosed with: Urinary tract infection with chronic indwelling Medina catheter    -Your catheter was exchanged in the emergency department.  -Your urine test positive for bacteria. You were given a dose of antibiotics in the ED and were discharged with a prescription of antibiotics. Please take as prescribed. -Please follow-up with your urologist and informed that you recently went to the emergency department for evaluation and found to have a UTI. -Please return to the Emergency Department if you are experiencing the following symptoms acutely: Pain at the catheter site, purulent discharge from the catheter, suprapubic pain, flank pain, headache, fever, chills, nausea, vomiting, chest pain, shortness of breath, abdominal pain, change with urination, change with bowel movements, change in your skin/hair/nail, weakness, fatigue, altered mental status and/or any change from baseline health.     -Thank you for coming to [facility].

## 2023-12-04 NOTE — DISCHARGE INSTR - COC
{THERAPEUTIC INTERVENTION:9590846334}  Weight Bearing Status/Restrictions: [address] CC Weight Bearin}  Other Medical Equipment (for information only, NOT a DME order):  {EQUIPMENT:697400111}  Other Treatments: ***    Patient's personal belongings (please select all that are sent with patient):  {CHP DME Belongings:072229503}    RN SIGNATURE:  {Esignature:428948097}    CASE MANAGEMENT/SOCIAL WORK SECTION    Inpatient Status Date: ***    Readmission Risk Assessment Score:  Readmission Risk              Risk of Unplanned Readmission:  0           Discharging to Facility/ Agency   Name:   Address:  Phone:  Fax:    Dialysis Facility (if applicable)   Name:  Address:  Dialysis Schedule:  Phone:  Fax:    / signature: {Esignature:422191184}    PHYSICIAN SECTION    Prognosis: {Prognosis:5551578563}    Condition at Discharge: [address] Patient Condition:218502299}    Rehab Potential (if transferring to Rehab): {Prognosis:5042491360}    Recommended Labs or Other Treatments After Discharge: ***    Physician Certification: I certify the above information and transfer of Burt Locke  is necessary for the continuing treatment of the diagnosis listed and that she requires {Admit to Appropriate Level of Care:40658} for {GREATER/LESS:221417577} 30 days.      Update Admission H&P: {CHP DME Changes in EICBC:746431367}    PHYSICIAN SIGNATURE:  {Esignature:217407497}

## 2023-12-04 NOTE — ED NOTES
Pt with indwelling catheter, c/o \"bleach smelling\" urine and catheter keeps disconnecting from collection device. Pt reports she was supposed to get catheter exchanged 4 weeks ago at urology office, \"but they kept changing appointments on me and I'm now scheduled to go there on 12/08/2023\". Pt denies any pain to catheter site, denies flank pain or suprapubic pain. Dr. Danii Noriega at bedside to evaluate pt.       Mro Lozada RN  12/03/23 1000

## 2023-12-05 LAB
MICROORGANISM SPEC CULT: ABNORMAL
MICROORGANISM SPEC CULT: ABNORMAL
SPECIMEN DESCRIPTION: ABNORMAL

## 2023-12-06 NOTE — PROGRESS NOTES
Reviewed patient's urine culture - culture positive for Klebsiella oxytoca. Patient was discharged on cefuroxime 500 mg BID x 7d, and bacteria presumed susceptible to abx as culture is sensitive to ceftriaxone and patient has no history of ESBL organisms. Antibiotic prescribed at discharge is appropriate - no changes made to antibiotic regimen. Patient to f/u with urology 12/8.     Isabel Ballard  PharmD Candidate

## 2023-12-27 ENCOUNTER — HOSPITAL ENCOUNTER (EMERGENCY)
Age: 58
Discharge: HOME | End: 2023-12-27
Attending: EMERGENCY MEDICINE
Payer: COMMERCIAL

## 2023-12-27 VITALS
BODY MASS INDEX: 32.2 KG/M2 | DIASTOLIC BLOOD PRESSURE: 101 MMHG | WEIGHT: 175 LBS | HEIGHT: 62 IN | RESPIRATION RATE: 16 BRPM | SYSTOLIC BLOOD PRESSURE: 139 MMHG | TEMPERATURE: 97.1 F | HEART RATE: 82 BPM | OXYGEN SATURATION: 98 %

## 2023-12-27 DIAGNOSIS — Z46.6 ENCOUNTER FOR FOLEY CATHETER REPLACEMENT: Primary | ICD-10-CM

## 2023-12-27 LAB
BACTERIA URNS QL MICRO: NORMAL
BILIRUB UR QL STRIP: NEGATIVE
CASTS #/AREA URNS LPF: NORMAL /LPF (ref 0–8)
CLARITY UR: CLEAR
COLOR UR: YELLOW
EPI CELLS #/AREA URNS HPF: NORMAL /HPF (ref 0–5)
GLUCOSE UR STRIP-MCNC: NEGATIVE MG/DL
HGB UR QL STRIP.AUTO: NEGATIVE
KETONES UR STRIP-MCNC: NEGATIVE MG/DL
LEUKOCYTE ESTERASE UR QL STRIP: ABNORMAL
NITRITE UR QL STRIP: NEGATIVE
PH UR STRIP: 6.5 [PH] (ref 5–8)
PROT UR STRIP-MCNC: NEGATIVE MG/DL
RBC #/AREA URNS HPF: NORMAL /HPF (ref 0–4)
SP GR UR STRIP: 1 (ref 1–1.03)
UROBILINOGEN UR STRIP-ACNC: NORMAL EU/DL (ref 0–1)
WBC #/AREA URNS HPF: NORMAL /HPF (ref 0–5)

## 2023-12-27 PROCEDURE — 51702 INSERT TEMP BLADDER CATH: CPT | Performed by: EMERGENCY MEDICINE

## 2023-12-27 PROCEDURE — 81001 URINALYSIS AUTO W/SCOPE: CPT

## 2023-12-27 PROCEDURE — 99283 EMERGENCY DEPT VISIT LOW MDM: CPT | Performed by: EMERGENCY MEDICINE

## 2023-12-27 ASSESSMENT — PAIN - FUNCTIONAL ASSESSMENT
PAIN_FUNCTIONAL_ASSESSMENT: NONE - DENIES PAIN
PAIN_FUNCTIONAL_ASSESSMENT: NONE - DENIES PAIN

## 2023-12-27 NOTE — ED PROVIDER NOTES
[address] [place] [address] ED  Emergency Department Encounter  Emergency Medicine Resident     Pt Huey Sterling  MRN: [id number]  [address] [facility] 1965  Date of evaluation: 23  PCP:  ASHOK Jane CNP  10:52 AM EST      CHIEF COMPLAINT       Chief Complaint   Patient presents with    Medina Complications       HISTORY OF PRESENT ILLNESS  (Location/Symptom, Timing/Onset, Context/Setting, Quality, Duration, Modifying Factors, Severity.)      Se Cervantes is a 60 y.o. female who presents with Medina bag leaking, patient states must of started overnight when she woke up with urine leaking. Still draining through her Medina catheter. She has chronic indwelling Medina follows up with urology and gets exchanged every 4 weeks last of which she says was about 3 weeks ago. She also reports she has chronic UTIs. Came in via ambulance due to bag leaking. PAST MEDICAL / SURGICAL / SOCIAL / FAMILY HISTORY      has a past medical history of Anxiety, Chronic back pain, Chronic kidney disease, Hypertension, Kidney stone, Mitral valve prolapse, and Stroke (cerebrum) ([address] has a past surgical history that includes  section; Hysterectomy (); and Tonsillectomy. Social History     Socioeconomic History    Marital status:       Spouse name: Not on file    Number of children: 3    Years of education: Not on file    Highest education level: Not on file   Occupational History    Not on file   Tobacco Use    Smoking status: Never    Smokeless tobacco: Not on file   Substance and Sexual Activity    Alcohol use: No    Drug use: No    Sexual activity: Not Currently     Partners: Male   Other Topics Concern    Not on file   Social History Narrative    Not on file     Social Determinants of Health     Financial Resource Strain: Low Risk  (4/10/2023)    Overall Financial Resource Strain (CARDIA)     Difficulty of Paying Living Expenses: Not hard at all   Food Insecurity: Not on file

## 2023-12-27 NOTE — DISCHARGE INSTRUCTIONS
Please follow-up for your scheduled appointment with urology, return to ER for additional concerns, abdominal pain or Medina not draining.

## 2023-12-27 NOTE — ED TRIAGE NOTES
Patient presents to the ED ambulatory from Triage. Patient states that she believes that her chronic morocho has a hole in the bag as there was urine that leaked into her purse earlier today that was carrying her urometer. Patient is in NAD, respirations are even and unlabored, bed in lowest position and call light with in reach. Writer will continue with plan of care.

## 2024-01-19 ENCOUNTER — HOSPITAL ENCOUNTER (EMERGENCY)
Age: 59
Discharge: HOME | End: 2024-01-19
Attending: EMERGENCY MEDICINE
Payer: COMMERCIAL

## 2024-01-19 VITALS
WEIGHT: 170.64 LBS | DIASTOLIC BLOOD PRESSURE: 87 MMHG | SYSTOLIC BLOOD PRESSURE: 125 MMHG | HEART RATE: 110 BPM | RESPIRATION RATE: 16 BRPM | OXYGEN SATURATION: 100 % | BODY MASS INDEX: 31.21 KG/M2 | TEMPERATURE: 97.6 F

## 2024-01-19 DIAGNOSIS — T83.9XXA FOLEY CATHETER PROBLEM, INITIAL ENCOUNTER (HCC): Primary | ICD-10-CM

## 2024-01-19 PROCEDURE — 51702 INSERT TEMP BLADDER CATH: CPT

## 2024-01-19 PROCEDURE — 99283 EMERGENCY DEPT VISIT LOW MDM: CPT

## 2024-01-20 NOTE — ED PROVIDER NOTES
[facility] ED  Emergency Department Encounter  Emergency Medicine Resident     Pt Name:Moy Mckinney  MRN: [id number]  Birthdate 1965  Date of evaluation: 24  PCP:  Iona Arroyo APRN - CNP  Note Started: 8:35 PM EST      CHIEF COMPLAINT       Chief Complaint   Patient presents with    Urinary Catheter     Medina broke        HISTORY OF PRESENT ILLNESS  (Location/Symptom, Timing/Onset, Context/Setting, Quality, Duration, Modifying Factors, Severity.)      Moy Mckinney is a 58 y.o. female who presents with concerns for her indwelling Medina catheter breaking.  Patient states that she had an indwelling Medina catheter placed in 2023 secondary to urinary incontinence and frequent UTIs.  She has it changed approximately every 4 weeks.  Patient explains that she lifted up her purse which was on top of the tubing and noticed that the tubing had broken.  Medina is otherwise still in place.  She denies having any abdominal pain.  She denies any urinary symptoms.  She states that she just came here to have her Medina replaced.    PAST MEDICAL / SURGICAL / SOCIAL / FAMILY HISTORY      has a past medical history of Anxiety, Chronic back pain, Chronic kidney disease, Hypertension, Kidney stone, Mitral valve prolapse, and Stroke (cerebrum) ([facility]).       has a past surgical history that includes  section; Hysterectomy (); and Tonsillectomy.      Social History     Socioeconomic History    Marital status:      Spouse name: Not on file    Number of children: 3    Years of education: Not on file    Highest education level: Not on file   Occupational History    Not on file   Tobacco Use    Smoking status: Never    Smokeless tobacco: Not on file   Substance and Sexual Activity    Alcohol use: No    Drug use: No    Sexual activity: Not Currently     Partners: Male   Other Topics Concern    Not on file   Social History Narrative    Not on file     Social Determinants of Health

## 2024-01-20 NOTE — ED NOTES
Pt presents to the ED ambulatory via triage with c/o urinary catheter problem.  Pt states she has had urinary catheter since April 2023 due to lack of bladder control and frequent UTI's.   Pt has had no complications up to this point.  Pt states she stood up and noticed the tubing on her morocho was broken off the bag. Pt requests to have the whole catheter changed due to frequent UTI's.  Denies pain.   Denies any other complaints.  Whiteboard updated, call light in reach, vitals obtained

## 2024-01-20 NOTE — ED PROVIDER NOTES
[facility] ED     Emergency Department     Faculty Attestation        I performed a history and physical examination of the patient and discussed management with the resident. I reviewed the resident’s note and agree with the documented findings and plan of care. Any areas of disagreement are noted on the chart. I was personally present for the key portions of any procedures. I have documented in the chart those procedures where I was not present during the key portions. I have reviewed the emergency nurses triage note. I agree with the chief complaint, past medical history, past surgical history, allergies, medications, social and family history as documented unless otherwise noted below.  For Physician Assistant/ Nurse Practitioner cases/documentation I have personally evaluated this patient and have completed at least one if not all key elements of the E/M (history, physical exam, and MDM). Additional findings are as noted.      Vital Signs: BP: 125/87  Pulse: (!) 110  Respirations: 16  Temp: 97.6 °F (36.4 °C) SpO2: 100 %  PCP:  Iona Arroyo, ASHOK - CNP  Note Started: 1/19/24, 8:06 PM EST    Pertinent Comments:         Critical Care  None      (Please note that portions of this note were completed with a voice recognition program. Efforts were made to edit the dictations but occasionally words are mis-transcribed. Whenever words are used in this note in any gender, they shall be construed as though they were used in the gender appropriate to the circumstances; and whenever words are used in this note in the singular or plural form, they shall be construed as though they were used in the form appropriate to the circumstances.)    Marlo Powers MD [facility]  Attending Emergency Medicine Physician           Marlo Powers MD  01/19/24 2006

## 2024-02-09 ENCOUNTER — NURSE ONLY (OUTPATIENT)
Dept: UROLOGY | Age: 59
End: 2024-02-09
Payer: COMMERCIAL

## 2024-02-09 DIAGNOSIS — R33.9 URINARY RETENTION: Primary | ICD-10-CM

## 2024-02-09 DIAGNOSIS — N31.2 BLADDER ATONY: ICD-10-CM

## 2024-02-09 PROCEDURE — 51702 INSERT TEMP BLADDER CATH: CPT | Performed by: UROLOGY

## 2024-02-09 PROCEDURE — 99211 OFF/OP EST MAY X REQ PHY/QHP: CPT | Performed by: UROLOGY

## 2024-02-09 NOTE — PROGRESS NOTES
Suprapubic Catheter Change and Insertion Procedure:  Risks and Benefits discussed with patient prior to procedure.      Placement Date: 24    Verbal consent obtained from patient prior to procedure.  Patient arrived with old SP catheter in place, balloon deflated and was removed without complication.  New 16F catheter inserted utilizing sterile technique per organization policy placed by Deana Benitez LPN.  10ml sterile water balloon inflated, attached to right leg with return of 20cc urine.   SP catheter secured.  Patient tolerated procedure well and without complications. Reviewed catheter care. Verbalized understanding.       TIMEOUT  Correct patient (patient name and ) yes  Correct Site yes  Correct Procedure yes    PROCEDURE DONE: yes

## 2024-02-15 ENCOUNTER — HOSPITAL ENCOUNTER (EMERGENCY)
Age: 59
Discharge: HOME | End: 2024-02-15
Attending: EMERGENCY MEDICINE
Payer: COMMERCIAL

## 2024-02-15 VITALS
RESPIRATION RATE: 16 BRPM | SYSTOLIC BLOOD PRESSURE: 122 MMHG | DIASTOLIC BLOOD PRESSURE: 89 MMHG | WEIGHT: 165 LBS | BODY MASS INDEX: 30.18 KG/M2 | HEART RATE: 95 BPM | OXYGEN SATURATION: 98 % | TEMPERATURE: 97.2 F

## 2024-02-15 DIAGNOSIS — T83.9XXA PROBLEM WITH FOLEY CATHETER, INITIAL ENCOUNTER (HCC): Primary | ICD-10-CM

## 2024-02-15 PROCEDURE — 99282 EMERGENCY DEPT VISIT SF MDM: CPT

## 2024-02-15 ASSESSMENT — PAIN - FUNCTIONAL ASSESSMENT: PAIN_FUNCTIONAL_ASSESSMENT: NONE - DENIES PAIN

## 2024-02-15 NOTE — ED TRIAGE NOTES
Pt presents to ER with her morocho catheter being disconnected from the drainage bag. Pt states every time she urinates it becomes disconnected. Rn informed pt that she needs to push it on tight and not have it be exposed to air due to the catheter going directly into bladder. RN placed new standard drainage bag onto morocho. Pt has no other complaints

## 2024-02-15 NOTE — DISCHARGE INSTRUCTIONS
You are seen in the emergency department for leaking Medina catheter.  Bag was replaced.  Please follow-up with urology.  Please return the emergency department for any new or worsening symptoms.

## 2024-02-15 NOTE — ED PROVIDER NOTES
[facility] ED  Emergency Department Encounter  Emergency Medicine Resident     Pt Name:Moy Mckinney  MRN: [id number]  Birthdate 1965  Date of evaluation: 2/15/24  PCP:  Iona Arroyo APRN - CNP  Note Started: 5:05 PM EST      CHIEF COMPLAINT       Chief Complaint   Patient presents with    Urinary Catheter     Catheter disconnected       HISTORY OF PRESENT ILLNESS  (Location/Symptom, Timing/Onset, Context/Setting, Quality, Duration, Modifying Factors, Severity.)      Moy Mckinney is a 58 y.o. female who presents with problems with Medina catheter.  Patient has a chronic indwelling Medina catheter.  She states that the catheter tubing became dislodged from the leg bag today and started leaking everywhere.  Patient is seen multiple times in the emergency department for the same.  She denies any urinary complaints.  No abdominal pain.  No other symptoms at this time.    PAST MEDICAL / SURGICAL / SOCIAL / FAMILY HISTORY      has a past medical history of Anxiety, Chronic back pain, Chronic kidney disease, Hypertension, Kidney stone, Mitral valve prolapse, and Stroke (cerebrum) ([facility]).       has a past surgical history that includes  section; Hysterectomy (); and Tonsillectomy.      Social History     Socioeconomic History    Marital status:      Spouse name: Not on file    Number of children: 3    Years of education: Not on file    Highest education level: Not on file   Occupational History    Not on file   Tobacco Use    Smoking status: Never    Smokeless tobacco: Not on file   Substance and Sexual Activity    Alcohol use: No    Drug use: No    Sexual activity: Not Currently     Partners: Male   Other Topics Concern    Not on file   Social History Narrative    Not on file     Social Determinants of Health     Financial Resource Strain: Low Risk  (4/10/2023)    Overall Financial Resource Strain (CARDIA)     Difficulty of Paying Living Expenses: Not hard at all   Food

## 2024-02-16 NOTE — ED PROVIDER NOTES
Note Started: 9:10 PM SOHAIL         [facility]     Emergency Department     Faculty Attestation    I performed a history and physical examination of the patient and discussed management with the resident. I reviewed the resident’s note and agree with the documented findings and plan of care. Any areas of disagreement are noted on the chart. I was personally present for the key portions of any procedures. I have documented in the chart those procedures where I was not present during the key portions. I have reviewed the emergency nurses triage note. I agree with the chief complaint, past medical history, past surgical history, allergies, medications, social and family history as documented unless otherwise noted below.        For Physician Assistant/ Nurse Practitioner cases/documentation I have personally evaluated this patient and have completed at least one if not all key elements of the E/M (history, physical exam, and MDM). Additional findings are as noted.  I have personally seen and evaluated the patient.  I find the patient's history and physical exam are consistent with the NP/PA documentation.  I agree with the care provided, treatment rendered, disposition and follow-up plan.    58-year-old female with chronic indwelling Medina catheter presenting for catheter malfunction.  Connector between catheter and bag broke.  Still draining urine.  No pain.    Plan:  Medina bag replaced  Follow-up with outpatient provider as needed    Medical Decision Making        Scarlett Ward MD   Attending Emergency Physician    (Please note that portions of this note were completed with a voice recognition program. Efforts were made to edit the dictations but occasionally words are mis-transcribed.)            Scarlett Ward MD  02/15/24 1660

## 2024-02-21 ENCOUNTER — HOSPITAL ENCOUNTER (EMERGENCY)
Age: 59
Discharge: HOME | End: 2024-02-21
Attending: EMERGENCY MEDICINE
Payer: COMMERCIAL

## 2024-02-21 VITALS
HEART RATE: 100 BPM | RESPIRATION RATE: 18 BRPM | TEMPERATURE: 98.9 F | OXYGEN SATURATION: 99 % | DIASTOLIC BLOOD PRESSURE: 110 MMHG | SYSTOLIC BLOOD PRESSURE: 140 MMHG

## 2024-02-21 DIAGNOSIS — T83.011A MALFUNCTION OF FOLEY CATHETER, INITIAL ENCOUNTER (HCC): Primary | ICD-10-CM

## 2024-02-21 PROCEDURE — 51702 INSERT TEMP BLADDER CATH: CPT | Performed by: EMERGENCY MEDICINE

## 2024-02-21 PROCEDURE — 99283 EMERGENCY DEPT VISIT LOW MDM: CPT | Performed by: EMERGENCY MEDICINE

## 2024-02-21 ASSESSMENT — PAIN - FUNCTIONAL ASSESSMENT: PAIN_FUNCTIONAL_ASSESSMENT: NONE - DENIES PAIN

## 2024-02-21 NOTE — ED PROVIDER NOTES
[facility] ED  Emergency Department Encounter  Emergency Medicine Resident     Pt Name:Moy Mckinney  MRN: [id number]  Birthdate 1965  Date of evaluation: 24  PCP:  Iona Arroyo APRN - CNP  10:48 AM EST      CHIEF COMPLAINT       Chief Complaint   Patient presents with    Morocho Catheter Problem       HISTORY OF PRESENT ILLNESS  (Location/Symptom, Timing/Onset, Context/Setting, Quality, Duration, Modifying Factors, Severity.)      Moy Mckinney is a 58 y.o. female who presents with chronic indwelling morocho, patient reports tubing disconnected from morocho, and it is leaking, she had this issue a few days ago and came into ER and they reconnected it, but it continues to disconnect, patient reports last exchange was 7 weeks ago.   H/o previous stroke    PAST MEDICAL / SURGICAL / SOCIAL / FAMILY HISTORY      has a past medical history of Anxiety, Chronic back pain, Chronic kidney disease, Hypertension, Kidney stone, Mitral valve prolapse, and Stroke (cerebrum) (HCC).       has a past surgical history that includes  section; Hysterectomy (); and Tonsillectomy.      Social History     Socioeconomic History    Marital status:      Spouse name: Not on file    Number of children: 3    Years of education: Not on file    Highest education level: Not on file   Occupational History    Not on file   Tobacco Use    Smoking status: Never    Smokeless tobacco: Not on file   Substance and Sexual Activity    Alcohol use: No    Drug use: No    Sexual activity: Not Currently     Partners: Male   Other Topics Concern    Not on file   Social History Narrative    Not on file     Social Determinants of Health     Financial Resource Strain: Low Risk  (4/10/2023)    Overall Financial Resource Strain (CARDIA)     Difficulty of Paying Living Expenses: Not hard at all   Food Insecurity: Not on file (4/10/2023)   Transportation Needs: No Transportation Needs (4/10/2023)    PRAPARE -

## 2024-02-21 NOTE — ED TRIAGE NOTES
Pt presents to ED via walking through triage c/o issue with morocho catheter. Pt reports morocho bag is leaking. Pt has morocho bag in a grocery bag. Pt reports morocho tubing changed \"few weeks ago.\" Pt denies any nausea or vomiting.   RR even and non labored.  NAD noted.  Call light within reach.

## 2024-03-22 ENCOUNTER — HOSPITAL ENCOUNTER (OUTPATIENT)
Age: 59
Setting detail: SPECIMEN
Discharge: HOME | End: 2024-03-22

## 2024-03-22 ENCOUNTER — NURSE ONLY (OUTPATIENT)
Dept: UROLOGY | Age: 59
End: 2024-03-22
Payer: COMMERCIAL

## 2024-03-22 DIAGNOSIS — R33.9 URINARY RETENTION: Primary | ICD-10-CM

## 2024-03-22 DIAGNOSIS — N31.2 BLADDER ATONY: ICD-10-CM

## 2024-03-22 DIAGNOSIS — R30.0 DYSURIA: ICD-10-CM

## 2024-03-22 PROCEDURE — 51702 INSERT TEMP BLADDER CATH: CPT | Performed by: UROLOGY

## 2024-03-22 NOTE — PROGRESS NOTES
Morocho Change and Insertion Procedure:    Placement Date: 3/22/24    Verbal consent obtained from patient prior to procedure.  Patient arrived with old catheter in place, balloon deflated and was removed without complication. Lidocaine 2% 100 mg Jelly inserted into urethra. New 16F morocho inserted utilizing sterile technique per organization policy placed by Deana Benitez LPN.  10ml sterile water balloon inflated, attached to right leg with return of 20cc urine.   Morocho secured.  Patient tolerated procedure well and without complications. Reviewed morocho care. Verbalized understanding.     TIMEOUT  Correct patient (patient name and ) yes  Correct Site yes  Correct Procedure yes     PROCEDURE DONE: yes

## 2024-03-24 LAB
MICROORGANISM SPEC CULT: NORMAL
SPECIMEN DESCRIPTION: NORMAL

## 2024-03-25 ENCOUNTER — HOSPITAL ENCOUNTER (OUTPATIENT)
Dept: MAMMOGRAPHY | Age: 59
Discharge: HOME | End: 2024-03-27
Payer: COMMERCIAL

## 2024-03-25 VITALS — HEIGHT: 62 IN | WEIGHT: 165 LBS | BODY MASS INDEX: 30.36 KG/M2

## 2024-03-25 DIAGNOSIS — Z12.31 ENCOUNTER FOR SCREENING MAMMOGRAM FOR MALIGNANT NEOPLASM OF BREAST: ICD-10-CM

## 2024-03-25 PROCEDURE — 77063 BREAST TOMOSYNTHESIS BI: CPT

## 2024-04-16 ENCOUNTER — HOSPITAL ENCOUNTER (EMERGENCY)
Age: 59
Discharge: HOME | End: 2024-04-16
Attending: EMERGENCY MEDICINE
Payer: COMMERCIAL

## 2024-04-16 VITALS
SYSTOLIC BLOOD PRESSURE: 144 MMHG | BODY MASS INDEX: 32.98 KG/M2 | RESPIRATION RATE: 18 BRPM | OXYGEN SATURATION: 97 % | DIASTOLIC BLOOD PRESSURE: 102 MMHG | HEART RATE: 91 BPM | WEIGHT: 180.34 LBS | TEMPERATURE: 96.4 F

## 2024-04-16 DIAGNOSIS — N30.00 ACUTE CYSTITIS WITHOUT HEMATURIA: Primary | ICD-10-CM

## 2024-04-16 LAB
BACTERIA URNS QL MICRO: ABNORMAL
BILIRUB UR QL STRIP: NEGATIVE
CASTS #/AREA URNS LPF: ABNORMAL /LPF (ref 0–8)
CLARITY UR: ABNORMAL
COLOR UR: YELLOW
EPI CELLS #/AREA URNS HPF: ABNORMAL /HPF (ref 0–5)
GLUCOSE BLD-MCNC: 222 MG/DL (ref 65–105)
GLUCOSE UR STRIP-MCNC: ABNORMAL MG/DL
HGB UR QL STRIP.AUTO: ABNORMAL
KETONES UR STRIP-MCNC: ABNORMAL MG/DL
LEUKOCYTE ESTERASE UR QL STRIP: ABNORMAL
NITRITE UR QL STRIP: NEGATIVE
PH UR STRIP: 6.5 [PH] (ref 5–8)
PROT UR STRIP-MCNC: ABNORMAL MG/DL
RBC #/AREA URNS HPF: ABNORMAL /HPF (ref 0–4)
SP GR UR STRIP: 1.01 (ref 1–1.03)
UROBILINOGEN UR STRIP-ACNC: NORMAL EU/DL (ref 0–1)
WBC #/AREA URNS HPF: ABNORMAL /HPF (ref 0–5)

## 2024-04-16 PROCEDURE — 82947 ASSAY GLUCOSE BLOOD QUANT: CPT

## 2024-04-16 PROCEDURE — 99283 EMERGENCY DEPT VISIT LOW MDM: CPT | Performed by: EMERGENCY MEDICINE

## 2024-04-16 PROCEDURE — 81001 URINALYSIS AUTO W/SCOPE: CPT

## 2024-04-16 PROCEDURE — 86403 PARTICLE AGGLUT ANTBDY SCRN: CPT

## 2024-04-16 PROCEDURE — 87086 URINE CULTURE/COLONY COUNT: CPT

## 2024-04-16 PROCEDURE — 6370000000 HC RX 637 (ALT 250 FOR IP): Performed by: PEDIATRICS

## 2024-04-16 RX ORDER — CEPHALEXIN 500 MG/1
500 CAPSULE ORAL ONCE
Status: COMPLETED | OUTPATIENT
Start: 2024-04-16 | End: 2024-04-16

## 2024-04-16 RX ORDER — ACETAMINOPHEN 325 MG/1
650 TABLET ORAL ONCE
Status: COMPLETED | OUTPATIENT
Start: 2024-04-16 | End: 2024-04-16

## 2024-04-16 RX ORDER — PHENAZOPYRIDINE HYDROCHLORIDE 100 MG/1
200 TABLET, FILM COATED ORAL ONCE
Status: COMPLETED | OUTPATIENT
Start: 2024-04-16 | End: 2024-04-16

## 2024-04-16 RX ORDER — CEPHALEXIN 500 MG/1
500 CAPSULE ORAL 2 TIMES DAILY
Qty: 14 CAPSULE | Refills: 0 | Status: SHIPPED | OUTPATIENT
Start: 2024-04-16 | End: 2024-04-23

## 2024-04-16 RX ADMIN — CEPHALEXIN 500 MG: 500 CAPSULE ORAL at 13:12

## 2024-04-16 RX ADMIN — ACETAMINOPHEN 650 MG: 325 TABLET ORAL at 12:17

## 2024-04-16 RX ADMIN — PHENAZOPYRIDINE HYDROCHLORIDE 200 MG: 100 TABLET ORAL at 12:17

## 2024-04-16 ASSESSMENT — PAIN - FUNCTIONAL ASSESSMENT: PAIN_FUNCTIONAL_ASSESSMENT: 0-10

## 2024-04-16 ASSESSMENT — PAIN DESCRIPTION - LOCATION: LOCATION: VULVA

## 2024-04-16 ASSESSMENT — PAIN SCALES - GENERAL: PAINLEVEL_OUTOF10: 7

## 2024-04-16 NOTE — ED PROVIDER NOTES
[facility] ED  Emergency Department Encounter  Emergency Medicine Resident     Pt Name:Moy Mckinney  MRN: [id number]  Birthdate 1965  Date of evaluation: 24  PCP:  Iona Arroyo APRN - CNP    11:58 AM EDT     CHIEF COMPLAINT       Chief Complaint   Patient presents with    Dysuria     Since yesterday       HISTORY OF PRESENT ILLNESS  (Location/Symptom, Timing/Onset, Context/Setting, Quality, Duration, Modifying Factors, Severity.)      Moy Mckinney is a 58 y.o. female who presents with chronic Medina urinary retention here she believes she has dysuria urgency or frequency of pain at the urethral meatus last Medina change was 2 weeks ago denies fever chills or back pain tolerating p.o. denies changes in vision denies difficulty ambulating or feeling as though she is going to fall denies headache and denies generally feeling ill or body aches.    PAST MEDICAL / SURGICAL / SOCIAL / FAMILY HISTORY      has a past medical history of Anxiety, Chronic back pain, Chronic kidney disease, Hypertension, Kidney stone, Mitral valve prolapse, and Stroke (cerebrum) (HCC).       has a past surgical history that includes  section; Hysterectomy (); and Tonsillectomy.      Social History     Socioeconomic History    Marital status:      Spouse name: Not on file    Number of children: 3    Years of education: Not on file    Highest education level: Not on file   Occupational History    Not on file   Tobacco Use    Smoking status: Never    Smokeless tobacco: Not on file   Substance and Sexual Activity    Alcohol use: No    Drug use: No    Sexual activity: Not Currently     Partners: Male   Other Topics Concern    Not on file   Social History Narrative    Not on file     Social Determinants of Health     Financial Resource Strain: Low Risk  (4/10/2023)    Overall Financial Resource Strain (CARDIA)     Difficulty of Paying Living Expenses: Not hard at all   Food Insecurity: Not on

## 2024-04-16 NOTE — DISCHARGE INSTRUCTIONS
You have a urinary tract infection.  Please take the antibiotic as prescribed.  Do not stop this medication early.  Please complete the entire course.  If you skip a dose take you and your member and get back on track to complete it.    If you get fever, chills, back pain, feel ill it is possible that your urinary tract infection worsened and please return to the emergency department.

## 2024-04-16 NOTE — ED PROVIDER NOTES
[facility]     Emergency Department     Faculty Attestation  12:22 PM EDT      I performed a history and physical examination of the patient and discussed management with the resident. I have reviewed and agree with the resident’s findings including all diagnostic interpretations, and treatment plans as written. Any areas of disagreement are noted on the chart. I was personally present for the key portions of any procedures. I have documented in the chart those procedures where I was not present during the key portions. I have reviewed the emergency nurses triage note. I agree with the chief complaint, past medical history, past surgical history, allergies, medications, social and family history as documented unless otherwise noted below. Documentation of the HPI, Physical Exam and Medical Decision Making performed by scribawilda is based on my personal performance of the HPI, PE and MDM. For Physician Assistant/ Nurse Practitioner cases/documentation I have personally evaluated this patient and have completed at least one if not all key elements of the E/M (history, physical exam, and MDM). Additional findings are as noted.    Primary Care Physician: Iona Arroyo, ASHOK - CNP    In dwelling morocho cath, last exchanged 2 weeks ago, reports discomfort near her clitoris.    On exam with Sophia gunn in the room, patient  does show area just superior to insertion of morocho that is erytheamatous, and tender to palpation, the morocho is draining cloudy yellow urine with sedement present,    The morocho is not attached to her leg. And patient is yanking on it and moving around.  Offered to place sticker to leg so the movement of morocho is less, but patient refuses.  I feel that with it moving so freely it is causing irriation at the insertion site.  Patient also has what looks like UTI given appearing of Urine in the tubing, so will check UA.      Esme LARA

## 2024-04-18 LAB
MICROORGANISM SPEC CULT: NORMAL
SPECIMEN DESCRIPTION: NORMAL

## 2024-04-19 NOTE — PROGRESS NOTES
Reviewed patient's urine culture - culture positive for several types of bacteria (none identified).  Patient was discharged on cephalexin, and it is unsure whether this is appropriate therapy given bacteria were not speciated.    Attempted to call the patient to determine whether symptoms have improved with cephalexin but phone number went straight to voicemail. Will attempt to call again later.    Arlene Hernandez PharmD, [facility]  4/19/2024  11:27 AM     Attempted to call again but went straight to voicemail. Would recommend the patient be seen to have repeat urinalysis/culture done if she is still having symptoms.     Arlene Hernandez PharmD, [facility]  4/19/2024  8:55 PM

## 2024-05-10 ENCOUNTER — NURSE ONLY (OUTPATIENT)
Dept: UROLOGY | Age: 59
End: 2024-05-10
Payer: COMMERCIAL

## 2024-05-10 DIAGNOSIS — N31.2 BLADDER ATONY: ICD-10-CM

## 2024-05-10 DIAGNOSIS — R33.9 URINARY RETENTION: Primary | ICD-10-CM

## 2024-05-10 PROCEDURE — 51702 INSERT TEMP BLADDER CATH: CPT | Performed by: UROLOGY

## 2024-05-10 NOTE — PROGRESS NOTES
Morocho Change and Insertion Procedure:    Placement Date: 5/10/24    Verbal consent obtained from patient prior to procedure.  Patient arrived with old catheter in place, balloon deflated and was removed without complication. Lidocaine 2% 100 mg Jelly inserted into urethra. New 16F morocho inserted utilizing sterile technique per organization policy placed by Deana Benitez LPN.  10ml sterile water balloon inflated, attached to right leg with return of 10cc urine.   Morocho secured.  Patient tolerated procedure well and without complications. Reviewed morocho care. Verbalized understanding.     TIMEOUT  Correct patient (patient name and ) yes  Correct Site yes  Correct Procedure yes     PROCEDURE DONE: yes

## 2024-05-16 ENCOUNTER — TELEPHONE (OUTPATIENT)
Dept: GASTROENTEROLOGY | Age: 59
End: 2024-05-16

## 2024-05-20 ENCOUNTER — HOSPITAL ENCOUNTER (EMERGENCY)
Age: 59
Discharge: HOME | End: 2024-05-20
Attending: EMERGENCY MEDICINE
Payer: COMMERCIAL

## 2024-05-20 VITALS
RESPIRATION RATE: 19 BRPM | DIASTOLIC BLOOD PRESSURE: 81 MMHG | SYSTOLIC BLOOD PRESSURE: 111 MMHG | OXYGEN SATURATION: 98 % | TEMPERATURE: 97.9 F | HEART RATE: 58 BPM

## 2024-05-20 DIAGNOSIS — T83.9XXA PROBLEM WITH FOLEY CATHETER, INITIAL ENCOUNTER (HCC): Primary | ICD-10-CM

## 2024-05-20 PROCEDURE — 99283 EMERGENCY DEPT VISIT LOW MDM: CPT

## 2024-05-20 ASSESSMENT — PAIN - FUNCTIONAL ASSESSMENT: PAIN_FUNCTIONAL_ASSESSMENT: NONE - DENIES PAIN

## 2024-05-20 NOTE — ED PROVIDER NOTES
Note Started: 6:58 PM EDT         [facility]     Emergency Department     Faculty Attestation    I performed a history and physical examination of the patient and discussed management with the resident. I reviewed the resident’s note and agree with the documented findings and plan of care. Any areas of disagreement are noted on the chart. I was personally present for the key portions of any procedures. I have documented in the chart those procedures where I was not present during the key portions. I have reviewed the emergency nurses triage note. I agree with the chief complaint, past medical history, past surgical history, allergies, medications, social and family history as documented unless otherwise noted below.        For Physician Assistant/ Nurse Practitioner cases/documentation I have personally evaluated this patient and have completed at least one if not all key elements of the E/M (history, physical exam, and MDM). Additional findings are as noted.  I have personally seen and evaluated the patient.  I find the patient's history and physical exam are consistent with the NP/PA documentation.  I agree with the care provided, treatment rendered, disposition and follow-up plan.    58-year-old female presenting with longstanding Medina catheter for neurogenic bladder.  Medina catheter has come disconnected from the bag system and has torn partially.  Patient is holding catheter on the leg with a strip of towel.  No fever or pain      Plan:  Medina catheter replacement  Follow-up with urology    Medical Decision Making        Scarlett Ward MD   Attending Emergency Physician    (Please note that portions of this note were completed with a voice recognition program. Efforts were made to edit the dictations but occasionally words are mis-transcribed.)            Scarlett Ward MD  05/20/24 3778    
Effort: Pulmonary effort is normal.      Breath sounds: Normal breath sounds.   Abdominal:      Palpations: Abdomen is soft.      Tenderness: There is no abdominal tenderness. There is no guarding or rebound.      Comments: Medina catheter in place   Skin:     General: Skin is warm and dry.   Neurological:      General: No focal deficit present.      Mental Status: She is alert and oriented to person, place, and time.           DDX/DIAGNOSTIC RESULTS / EMERGENCY DEPARTMENT COURSE / MDM     Medical Decision Making  Vitals notable for bradycardia, otherwise within normal  Patient asymptomatic at this time  Will plan for Medina catheter replacement            EMERGENCY DEPARTMENT COURSE:      ED Course as of 05/23/24 0821   Mon May 20, 2024   1939 Patient reevaluated, Medina catheter in place.  Patient was given packaging for urinary catheter so that she can have this type next time she gets it replaced.  Given return precautions and patient expressed understanding.  Patient will follow-up outpatient with urology [TD]      ED Course User Index  [TD] Sima Thayer DO       CONSULTS:  None      FINAL IMPRESSION      1. Problem with Medina catheter, initial encounter ([facility])          DISPOSITION / PLAN     DISPOSITION Decision To Discharge 05/20/2024 07:47:02 PM      PATIENT REFERRED TO:  [facility] AT [address] [address] [phone]  Schedule an appointment as soon as possible for a visit       Marky Milner Jr., MD  [address] [place] [address]  [phone]      As needed      DISCHARGE MEDICATIONS:  There are no discharge medications for this patient.      Sima Thayer DO  Emergency Medicine Resident    (Please note that portions of this note were completed with a voice recognition program.  Efforts were made to edit the dictations but occasionally words are mis-transcribed.)

## 2024-05-20 NOTE — DISCHARGE INSTRUCTIONS
You were seen due to concerns for issue with your Medina catheter.  This was replaced while you are in the emergency department.  You need to follow-up outpatient with your urologist as soon as possible for reevaluation of this.  Return to the emergency department for any new problems with your Medina catheter, pain, fevers, other new or concerning symptoms.

## 2024-05-20 NOTE — ED NOTES
Pt came into the ed via triage due to having her morocho catheter connection pice come off   Pt is asking for a new catheter to be placed   Pt is Aox4 and ambulatory   Pt has no other C/O at this time   RR are equal and regular

## 2024-05-21 NOTE — ED NOTES
Writer assisted patient with scheduling transportation through her insurance at discharge.  Trip number [id number]

## 2024-05-23 ASSESSMENT — ENCOUNTER SYMPTOMS
SHORTNESS OF BREATH: 0
ABDOMINAL PAIN: 0

## 2024-06-14 ENCOUNTER — NURSE ONLY (OUTPATIENT)
Dept: UROLOGY | Age: 59
End: 2024-06-14
Payer: COMMERCIAL

## 2024-06-14 DIAGNOSIS — R33.9 URINARY RETENTION: Primary | ICD-10-CM

## 2024-06-14 DIAGNOSIS — N31.2 BLADDER ATONY: ICD-10-CM

## 2024-06-14 PROCEDURE — 51702 INSERT TEMP BLADDER CATH: CPT | Performed by: UROLOGY

## 2024-06-14 NOTE — PROGRESS NOTES
Morocho Change and Insertion Procedure:    Placement Date: 24    Verbal consent obtained from patient prior to procedure.  Patient arrived with old catheter in place, balloon deflated and was removed without complication. New 16F morocho inserted utilizing sterile technique per organization policy placed by Deana Benitez LPN.  10ml sterile water balloon inflated, attached to right leg with return of 20cc urine.   Morocho secured.  Patient tolerated procedure well and without complications. Reviewed morocho care. Verbalized understanding.     TIMEOUT  Correct patient (patient name and ) yes  Correct Site yes  Correct Procedure yes     PROCEDURE DONE: yes

## 2024-06-17 NOTE — PROGRESS NOTES
Patient instructed to remove shoes and socks and instructed to sit in exam chair.  Current PCP is Iona Arroyo APRN - CNP and date of last visit was 05/09/2023.   Do you have a follow up visit scheduled?  No  If yes, the date is       Diabetic visit information    Blood pressure (Control is BP <140/90)  BP Readings from Last 3 Encounters:   05/20/24 111/81   04/16/24 (!) 144/102   02/21/24 (!) 140/110       BP taken with correct size cuff? - Yes   Repeated if > 140/90 Yes      Tobacco use:  Patient  reports that she has never smoked. She does not have any smokeless tobacco history on file.  If Smoker - Cessation materials given?- Yes       Diabetic Health Maintenance Items due  Diabetes Management   Topic Date Due    Diabetic foot exam  Never done    Diabetic Alb to Cr ratio (uACR) test  Never done    Diabetic retinal exam  Never done    Lipids  04/16/2024    A1C test (Diabetic or Prediabetic)  04/17/2024       Diabetic retinal exam done in last year? - Yes   If No: remind patient that it is due and they should schedule an exam    Medications  Is patient taking any medications for diabetes? -   Yes  Have blood sugars been controlled?   Fasting blood sugars under 120   -   Yes   Random home sugars or today's POCT glucose is under 180 -   Yes   []  If No to the above then patient should schedule appt with PCP.     Diabetic Plan    A1C Plan  Lab Results   Component Value Date    LABA1C 8.1 (H) 04/17/2023    LABA1C 8.6 (H) 04/16/2023    LABA1C 8.5 (H) 04/16/2023      []  If A1C over 8 and last result >3 months ago - Order A1C and refer to PCP   []  If last A1C over 6 months ago - Order A1C and refer to PCP for follow up   []  If elevated blood sugars > 180 - refer to PCP for follow up    []  Blood sugar controlled - A1C under 8 and last check was < 6 months      Cholesterol Plan   No components found for: \"LDLCALC\", \"LDLCHOLESTEROL\"   []  If LDL > 100 and last result >3 months ago - order Fasting lipids and refer

## 2024-06-19 ENCOUNTER — OFFICE VISIT (OUTPATIENT)
Dept: PODIATRY | Age: 59
End: 2024-06-19
Payer: COMMERCIAL

## 2024-06-19 VITALS — WEIGHT: 187 LBS | HEIGHT: 62 IN | BODY MASS INDEX: 34.41 KG/M2

## 2024-06-19 DIAGNOSIS — E11.42 TYPE 2 DIABETES MELLITUS WITH DIABETIC POLYNEUROPATHY, UNSPECIFIED WHETHER LONG TERM INSULIN USE (HCC): ICD-10-CM

## 2024-06-19 DIAGNOSIS — M79.609 PAIN DUE TO ONYCHOMYCOSIS OF NAIL: ICD-10-CM

## 2024-06-19 DIAGNOSIS — B35.1 PAIN DUE TO ONYCHOMYCOSIS OF NAIL: ICD-10-CM

## 2024-06-19 DIAGNOSIS — B35.1 ONYCHOMYCOSIS: Primary | ICD-10-CM

## 2024-06-19 PROCEDURE — 99213 OFFICE O/P EST LOW 20 MIN: CPT | Performed by: PODIATRIST

## 2024-06-19 NOTE — PROGRESS NOTES
[facility]  [address]   [address] [place] [address]  Tel: [phone]   Fax: [phone]    Subjective     CC: Diabetic foot exam and painful and elongated toe nails    HPI:  Moy Mckinney is a 58 y.o. year old female who presents to clinic today for diabetic foot examination and also complaining of painful and elongated toenails.  The patient is a diabetic, and they're last HgbA1c was 8.1% in (2023). The patient states their digits are painful when the toenails are elongated, causing rubbing in shoe gear. The patient admits to tingling and numbness in the lower extremities. Patient denies any rest pain or claudication symptoms.The patient denies any history of acute trauma. The patient denies any other pedal complaints.     Primary care physician is Iona Arroyo APRN - CNP.    ROS:    Constitutional: Denies nausea, vomiting, fever, chills.  Neurologic: Admits to numbness, tingling, and burning in the feet.    Vascular: Denies symptoms of lower extremity claudication.    Skin: Denies open wounds.  Otherwise negative except as noted in the HPI.     PMH:  Past Medical History:   Diagnosis Date    Anxiety     Chronic back pain     Chronic kidney disease     Hypertension     Kidney stone     Mitral valve prolapse 1982    Stroke (cerebrum) ([facility])        Surgical History:   Past Surgical History:   Procedure Laterality Date     SECTION      HYSTERECTOMY (CERVIX STATUS UNKNOWN)  2008    TONSILLECTOMY         Social History:  Social History     Tobacco Use    Smoking status: Never   Substance Use Topics    Alcohol use: No    Drug use: No       Medications:  Prior to Admission medications    Medication Sig Start Date End Date Taking? Authorizing Provider   magnesium oxide (MAG-OX) 400 (240 Mg) MG tablet Take 1 tablet by mouth daily 23  Yes Provider, MD Josr   potassium chloride (KLOR-CON M) 20 MEQ extended release tablet Take 1 tablet by mouth 2 times daily

## 2024-07-12 ENCOUNTER — OFFICE VISIT (OUTPATIENT)
Dept: UROLOGY | Age: 59
End: 2024-07-12
Payer: COMMERCIAL

## 2024-07-12 DIAGNOSIS — N31.2 BLADDER ATONY: ICD-10-CM

## 2024-07-12 DIAGNOSIS — R33.9 URINARY RETENTION: Primary | ICD-10-CM

## 2024-07-12 PROCEDURE — 51702 INSERT TEMP BLADDER CATH: CPT | Performed by: UROLOGY

## 2024-07-12 NOTE — PROGRESS NOTES
Morocho Change and Insertion Procedure:    Placement Date: 24    Verbal consent obtained from patient prior to procedure.  Patient arrived with old catheter in place, balloon deflated and was removed without complication. Lidocaine 2% 100 mg Jelly inserted into urethra. New 16F morocho inserted utilizing sterile technique per organization policy placed by Deana Benitez LPN.  10ml sterile water balloon inflated, attached to right leg with return of 15cc urine.   Morocho secured.  Patient tolerated procedure well and without complications. Reviewed morocho care. Verbalized understanding.     TIMEOUT  Correct patient (patient name and ) yes  Correct Site yes  Correct Procedure yes     PROCEDURE DONE: yes

## 2024-07-29 ENCOUNTER — HOSPITAL ENCOUNTER (INPATIENT)
Age: 59
LOS: 3 days | Discharge: HOME | DRG: 871 | End: 2024-08-01
Attending: EMERGENCY MEDICINE | Admitting: PSYCHIATRY & NEUROLOGY
Payer: COMMERCIAL

## 2024-07-29 ENCOUNTER — APPOINTMENT (OUTPATIENT)
Dept: CT IMAGING | Age: 59
DRG: 871 | End: 2024-07-29
Payer: COMMERCIAL

## 2024-07-29 ENCOUNTER — APPOINTMENT (OUTPATIENT)
Dept: MRI IMAGING | Age: 59
DRG: 871 | End: 2024-07-29
Payer: COMMERCIAL

## 2024-07-29 DIAGNOSIS — R47.01 APHASIA: Primary | ICD-10-CM

## 2024-07-29 PROBLEM — I63.9 STROKE OF UNKNOWN ETIOLOGY (HCC): Status: ACTIVE | Noted: 2024-07-29

## 2024-07-29 LAB
ANION GAP SERPL CALCULATED.3IONS-SCNC: 22 MMOL/L (ref 9–16)
BACTERIA URNS QL MICRO: ABNORMAL
BASOPHILS # BLD: 0.05 K/UL (ref 0–0.2)
BASOPHILS NFR BLD: 0 % (ref 0–2)
BILIRUB UR QL STRIP: NEGATIVE
BUN BLD-MCNC: 8 MG/DL (ref 8–26)
BUN SERPL-MCNC: 10 MG/DL (ref 6–20)
CA-I BLD-SCNC: 1.2 MMOL/L (ref 1.15–1.33)
CALCIUM SERPL-MCNC: 9.8 MG/DL (ref 8.6–10.4)
CASTS #/AREA URNS LPF: ABNORMAL /LPF (ref 0–8)
CHLORIDE BLD-SCNC: 107 MMOL/L (ref 98–107)
CHLORIDE SERPL-SCNC: 100 MMOL/L (ref 98–107)
CHOLEST SERPL-MCNC: 139 MG/DL (ref 0–199)
CHOLESTEROL/HDL RATIO: 4
CLARITY UR: ABNORMAL
CO2 BLD CALC-SCNC: 16 MMOL/L (ref 22–30)
CO2 SERPL-SCNC: 15 MMOL/L (ref 20–31)
COLOR UR: YELLOW
CREAT SERPL-MCNC: 1.1 MG/DL (ref 0.5–0.9)
EGFR, POC: 43 ML/MIN/1.73M2
EOSINOPHIL # BLD: 0.06 K/UL (ref 0–0.44)
EOSINOPHILS RELATIVE PERCENT: 1 % (ref 1–4)
EPI CELLS #/AREA URNS HPF: ABNORMAL /HPF (ref 0–5)
ERYTHROCYTE [DISTWIDTH] IN BLOOD BY AUTOMATED COUNT: 13.5 % (ref 11.8–14.4)
EST. AVERAGE GLUCOSE BLD GHB EST-MCNC: 237 MG/DL
GFR, ESTIMATED: 33 ML/MIN/1.73M2
GLUCOSE BLD-MCNC: 189 MG/DL (ref 65–105)
GLUCOSE BLD-MCNC: 359 MG/DL (ref 74–100)
GLUCOSE SERPL-MCNC: 324 MG/DL (ref 74–99)
GLUCOSE UR STRIP-MCNC: NEGATIVE MG/DL
HBA1C MFR BLD: 9.9 % (ref 4–6)
HCO3 VENOUS: 17 MMOL/L (ref 22–29)
HCT VFR BLD AUTO: 39.3 % (ref 36.3–47.1)
HCT VFR BLD AUTO: 43 % (ref 36–46)
HDLC SERPL-MCNC: 38 MG/DL
HGB BLD-MCNC: 12.7 G/DL (ref 11.9–15.1)
HGB UR QL STRIP.AUTO: ABNORMAL
IMM GRANULOCYTES # BLD AUTO: <0.03 K/UL (ref 0–0.3)
IMM GRANULOCYTES NFR BLD: 0 %
KETONES UR STRIP-MCNC: NEGATIVE MG/DL
LDLC SERPL CALC-MCNC: 76 MG/DL (ref 0–100)
LEUKOCYTE ESTERASE UR QL STRIP: ABNORMAL
LYMPHOCYTES NFR BLD: 2.84 K/UL (ref 1.1–3.7)
LYMPHOCYTES RELATIVE PERCENT: 25 % (ref 24–43)
MCH RBC QN AUTO: 27.3 PG (ref 25.2–33.5)
MCHC RBC AUTO-ENTMCNC: 32.3 G/DL (ref 28.4–34.8)
MCV RBC AUTO: 84.3 FL (ref 82.6–102.9)
MONOCYTES NFR BLD: 0.81 K/UL (ref 0.1–1.2)
MONOCYTES NFR BLD: 7 % (ref 3–12)
NEGATIVE BASE EXCESS, VEN: 6.4 MMOL/L (ref 0–2)
NEUTROPHILS NFR BLD: 67 % (ref 36–65)
NEUTS SEG NFR BLD: 7.53 K/UL (ref 1.5–8.1)
NITRITE UR QL STRIP: NEGATIVE
NRBC BLD-RTO: 0 PER 100 WBC
O2 SAT, VEN: 91 % (ref 60–85)
PCO2 VENOUS: 28 MM HG (ref 41–51)
PH UR STRIP: 7 [PH] (ref 5–8)
PH VENOUS: 7.39 (ref 7.32–7.43)
PLATELET # BLD AUTO: 390 K/UL (ref 138–453)
PMV BLD AUTO: 10.2 FL (ref 8.1–13.5)
PO2 VENOUS: 60.2 MM HG (ref 30–50)
POC ANION GAP: 15 MMOL/L (ref 7–16)
POC CREATININE: 0.9 MG/DL (ref 0.51–1.19)
POC HEMOGLOBIN (CALC): 14.7 G/DL (ref 12–16)
POC LACTIC ACID: 4.3 MMOL/L (ref 0.56–1.39)
POTASSIUM BLD-SCNC: 3.6 MMOL/L (ref 3.5–4.5)
POTASSIUM SERPL-SCNC: 3.7 MMOL/L (ref 3.7–5.3)
PROT UR STRIP-MCNC: ABNORMAL MG/DL
RBC # BLD AUTO: 4.66 M/UL (ref 3.95–5.11)
RBC #/AREA URNS HPF: ABNORMAL /HPF (ref 0–4)
SODIUM BLD-SCNC: 137 MMOL/L (ref 138–146)
SODIUM SERPL-SCNC: 137 MMOL/L (ref 136–145)
SP GR UR STRIP: 1.01 (ref 1–1.03)
TRIGL SERPL-MCNC: 122 MG/DL
TROPONIN I SERPL HS-MCNC: 10 NG/L (ref 0–14)
TROPONIN I SERPL HS-MCNC: 10 NG/L (ref 0–14)
UROBILINOGEN UR STRIP-ACNC: NORMAL EU/DL (ref 0–1)
VLDLC SERPL CALC-MCNC: 24 MG/DL
WBC #/AREA URNS HPF: ABNORMAL /HPF (ref 0–5)
WBC OTHER # BLD: 11.3 K/UL (ref 3.5–11.3)

## 2024-07-29 PROCEDURE — 81001 URINALYSIS AUTO W/SCOPE: CPT

## 2024-07-29 PROCEDURE — 70551 MRI BRAIN STEM W/O DYE: CPT

## 2024-07-29 PROCEDURE — 71260 CT THORAX DX C+: CPT

## 2024-07-29 PROCEDURE — 87040 BLOOD CULTURE FOR BACTERIA: CPT

## 2024-07-29 PROCEDURE — 80048 BASIC METABOLIC PNL TOTAL CA: CPT

## 2024-07-29 PROCEDURE — 99285 EMERGENCY DEPT VISIT HI MDM: CPT

## 2024-07-29 PROCEDURE — 84520 ASSAY OF UREA NITROGEN: CPT

## 2024-07-29 PROCEDURE — 85014 HEMATOCRIT: CPT

## 2024-07-29 PROCEDURE — 6370000000 HC RX 637 (ALT 250 FOR IP)

## 2024-07-29 PROCEDURE — 80051 ELECTROLYTE PANEL: CPT

## 2024-07-29 PROCEDURE — 84484 ASSAY OF TROPONIN QUANT: CPT

## 2024-07-29 PROCEDURE — 2060000000 HC ICU INTERMEDIATE R&B

## 2024-07-29 PROCEDURE — 82565 ASSAY OF CREATININE: CPT

## 2024-07-29 PROCEDURE — 82330 ASSAY OF CALCIUM: CPT

## 2024-07-29 PROCEDURE — 82947 ASSAY GLUCOSE BLOOD QUANT: CPT

## 2024-07-29 PROCEDURE — 93005 ELECTROCARDIOGRAM TRACING: CPT | Performed by: PSYCHIATRY & NEUROLOGY

## 2024-07-29 PROCEDURE — 99223 1ST HOSP IP/OBS HIGH 75: CPT | Performed by: PSYCHIATRY & NEUROLOGY

## 2024-07-29 PROCEDURE — 82803 BLOOD GASES ANY COMBINATION: CPT

## 2024-07-29 PROCEDURE — 70498 CT ANGIOGRAPHY NECK: CPT

## 2024-07-29 PROCEDURE — 2580000003 HC RX 258

## 2024-07-29 PROCEDURE — 80061 LIPID PANEL: CPT

## 2024-07-29 PROCEDURE — 6360000002 HC RX W HCPCS

## 2024-07-29 PROCEDURE — 6360000004 HC RX CONTRAST MEDICATION

## 2024-07-29 PROCEDURE — 83605 ASSAY OF LACTIC ACID: CPT

## 2024-07-29 PROCEDURE — 83036 HEMOGLOBIN GLYCOSYLATED A1C: CPT

## 2024-07-29 PROCEDURE — 70450 CT HEAD/BRAIN W/O DYE: CPT

## 2024-07-29 PROCEDURE — 85025 COMPLETE CBC W/AUTO DIFF WBC: CPT

## 2024-07-29 RX ORDER — HEPARIN SODIUM 5000 [USP'U]/ML
5000 INJECTION, SOLUTION INTRAVENOUS; SUBCUTANEOUS EVERY 8 HOURS SCHEDULED
Status: DISCONTINUED | OUTPATIENT
Start: 2024-07-29 | End: 2024-08-01 | Stop reason: HOSPADM

## 2024-07-29 RX ORDER — ASPIRIN 81 MG/1
324 TABLET, CHEWABLE ORAL ONCE
Status: COMPLETED | OUTPATIENT
Start: 2024-07-29 | End: 2024-07-29

## 2024-07-29 RX ORDER — HYDRALAZINE HYDROCHLORIDE 20 MG/ML
10 INJECTION INTRAMUSCULAR; INTRAVENOUS EVERY 4 HOURS PRN
Status: DISCONTINUED | OUTPATIENT
Start: 2024-07-29 | End: 2024-07-30

## 2024-07-29 RX ORDER — SODIUM CHLORIDE 0.9 % (FLUSH) 0.9 %
5-40 SYRINGE (ML) INJECTION EVERY 12 HOURS SCHEDULED
Status: DISCONTINUED | OUTPATIENT
Start: 2024-07-29 | End: 2024-08-01 | Stop reason: HOSPADM

## 2024-07-29 RX ORDER — CLOPIDOGREL 300 MG/1
300 TABLET, FILM COATED ORAL ONCE
Status: COMPLETED | OUTPATIENT
Start: 2024-07-29 | End: 2024-07-29

## 2024-07-29 RX ORDER — ATORVASTATIN CALCIUM 80 MG/1
80 TABLET, FILM COATED ORAL NIGHTLY
Status: DISCONTINUED | OUTPATIENT
Start: 2024-07-29 | End: 2024-08-01 | Stop reason: HOSPADM

## 2024-07-29 RX ORDER — 0.9 % SODIUM CHLORIDE 0.9 %
1000 INTRAVENOUS SOLUTION INTRAVENOUS ONCE
Status: COMPLETED | OUTPATIENT
Start: 2024-07-29 | End: 2024-07-29

## 2024-07-29 RX ORDER — INSULIN LISPRO 100 [IU]/ML
0-4 INJECTION, SOLUTION INTRAVENOUS; SUBCUTANEOUS NIGHTLY
Status: DISCONTINUED | OUTPATIENT
Start: 2024-07-29 | End: 2024-07-30

## 2024-07-29 RX ORDER — DEXTROSE MONOHYDRATE 100 MG/ML
INJECTION, SOLUTION INTRAVENOUS CONTINUOUS PRN
Status: DISCONTINUED | OUTPATIENT
Start: 2024-07-29 | End: 2024-08-01 | Stop reason: HOSPADM

## 2024-07-29 RX ORDER — GLUCAGON 1 MG/ML
1 KIT INJECTION PRN
Status: DISCONTINUED | OUTPATIENT
Start: 2024-07-29 | End: 2024-08-01 | Stop reason: HOSPADM

## 2024-07-29 RX ORDER — ONDANSETRON 2 MG/ML
4 INJECTION INTRAMUSCULAR; INTRAVENOUS EVERY 6 HOURS PRN
Status: DISCONTINUED | OUTPATIENT
Start: 2024-07-29 | End: 2024-08-01 | Stop reason: HOSPADM

## 2024-07-29 RX ORDER — CLOPIDOGREL BISULFATE 75 MG/1
75 TABLET ORAL DAILY
Status: DISCONTINUED | OUTPATIENT
Start: 2024-07-30 | End: 2024-08-01 | Stop reason: HOSPADM

## 2024-07-29 RX ORDER — ONDANSETRON 4 MG/1
4 TABLET, ORALLY DISINTEGRATING ORAL EVERY 8 HOURS PRN
Status: DISCONTINUED | OUTPATIENT
Start: 2024-07-29 | End: 2024-08-01 | Stop reason: HOSPADM

## 2024-07-29 RX ORDER — SODIUM CHLORIDE 9 MG/ML
INJECTION, SOLUTION INTRAVENOUS CONTINUOUS
Status: DISCONTINUED | OUTPATIENT
Start: 2024-07-29 | End: 2024-07-30

## 2024-07-29 RX ORDER — INSULIN LISPRO 100 [IU]/ML
0-4 INJECTION, SOLUTION INTRAVENOUS; SUBCUTANEOUS
Status: DISCONTINUED | OUTPATIENT
Start: 2024-07-30 | End: 2024-07-30

## 2024-07-29 RX ORDER — POLYETHYLENE GLYCOL 3350 17 G/17G
17 POWDER, FOR SOLUTION ORAL DAILY PRN
Status: DISCONTINUED | OUTPATIENT
Start: 2024-07-29 | End: 2024-08-01 | Stop reason: HOSPADM

## 2024-07-29 RX ORDER — SODIUM CHLORIDE 9 MG/ML
INJECTION, SOLUTION INTRAVENOUS PRN
Status: DISCONTINUED | OUTPATIENT
Start: 2024-07-29 | End: 2024-08-01 | Stop reason: HOSPADM

## 2024-07-29 RX ORDER — ASPIRIN 81 MG/1
81 TABLET ORAL DAILY
Status: DISCONTINUED | OUTPATIENT
Start: 2024-07-30 | End: 2024-08-01 | Stop reason: HOSPADM

## 2024-07-29 RX ORDER — SODIUM CHLORIDE 0.9 % (FLUSH) 0.9 %
5-40 SYRINGE (ML) INJECTION PRN
Status: DISCONTINUED | OUTPATIENT
Start: 2024-07-29 | End: 2024-08-01 | Stop reason: HOSPADM

## 2024-07-29 RX ORDER — LABETALOL HYDROCHLORIDE 5 MG/ML
10 INJECTION, SOLUTION INTRAVENOUS EVERY 4 HOURS PRN
Status: DISCONTINUED | OUTPATIENT
Start: 2024-07-29 | End: 2024-07-30

## 2024-07-29 RX ADMIN — ASPIRIN 81 MG 324 MG: 81 TABLET ORAL at 18:54

## 2024-07-29 RX ADMIN — IOPAMIDOL 150 ML: 755 INJECTION, SOLUTION INTRAVENOUS at 17:25

## 2024-07-29 RX ADMIN — SODIUM CHLORIDE 1000 ML: 9 INJECTION, SOLUTION INTRAVENOUS at 18:55

## 2024-07-29 RX ADMIN — Medication 1000 MG: at 22:27

## 2024-07-29 RX ADMIN — ATORVASTATIN CALCIUM 80 MG: 80 TABLET, FILM COATED ORAL at 21:58

## 2024-07-29 RX ADMIN — SODIUM CHLORIDE: 9 INJECTION, SOLUTION INTRAVENOUS at 22:01

## 2024-07-29 RX ADMIN — SODIUM CHLORIDE, PRESERVATIVE FREE 10 ML: 5 INJECTION INTRAVENOUS at 22:02

## 2024-07-29 RX ADMIN — CLOPIDOGREL BISULFATE 300 MG: 300 TABLET, FILM COATED ORAL at 18:54

## 2024-07-29 ASSESSMENT — ENCOUNTER SYMPTOMS: ABDOMINAL PAIN: 0

## 2024-07-29 NOTE — ED NOTES
Clarice at bedside looking for second access    
Pt to MRI  
Pt to ed via ems to room with complaints of ams  Pt was found in the lobby of [facility]   Per ems, pt only able to state name  Pts speech slurred  Pt states hx of stroke but no deficits from previous stroke  Pt states she had a tubal ligation  Pt presents with morocho in place  Pt denies having any pain  Pt presents tachycardic   Pt states she is compliant with medications   Pt placed on cont cardiac monitor, ekg completed, iv established, labs drawn, labeled and will send to lab via orders  Pt alert and oriented x4, talking in complete sentences, respirations even and unlabored. Pt acting age appropriate. White board updated, will continue to plan of care   
Sister at bedside with pt    
Marital status: Single       FAMILY HISTORY     No family history on file.    ALLERGIES     Patient has no known allergies.    CURRENT MEDICATIONS       Previous Medications    No medications on file     Orders Placed This Encounter   Medications    DISCONTD: iopamidol (ISOVUE-370) 76 % injection 90 mL    iopamidol (ISOVUE-370) 76 % injection 150 mL    aspirin chewable tablet 324 mg    clopidogrel (PLAVIX) tablet 300 mg    sodium chloride 0.9 % bolus 1,000 mL       SURGICAL HISTORY     No past surgical history on file.    PAST MEDICAL HISTORY     No past medical history on file.    Labs:  Labs Reviewed   CBC WITH AUTO DIFFERENTIAL - Abnormal; Notable for the following components:       Result Value    Neutrophils % 67 (*)     All other components within normal limits   BASIC METABOLIC PANEL - Abnormal; Notable for the following components:    CO2 15 (*)     Anion Gap 22 (*)     Glucose 324 (*)     Creatinine 1.1 (*)     Est, Glom Filt Rate 33 (*)     All other components within normal limits   URINALYSIS WITH REFLEX TO CULTURE - Abnormal; Notable for the following components:    Turbidity UA Turbid (*)     Urine Hgb TRACE (*)     Protein, UA 1+ (*)     Leukocyte Esterase, Urine LARGE (*)     All other components within normal limits   ELECTROLYTES PLUS - Abnormal; Notable for the following components:    POC Sodium 137 (*)     POC TCO2 16 (*)     All other components within normal limits   MICROSCOPIC URINALYSIS - Abnormal; Notable for the following components:    Bacteria, UA MANY (*)     All other components within normal limits   VENOUS BLOOD GAS, POINT OF CARE - Abnormal; Notable for the following components:    pCO2, Michael 28.0 (*)     PO2, Michael 60.2 (*)     HCO3, Venous 17.0 (*)     Negative Base Excess, Michael 6.4 (*)     O2 Sat, Michael 91.0 (*)     All other components within normal limits   CREATININE W/GFR POINT OF CARE - Abnormal; Notable for the following components:    eGFR, POC 43 (*)     All other components

## 2024-07-29 NOTE — ED PROVIDER NOTES
[facility] ED     Emergency Department     Faculty Attestation        I performed a history and physical examination of the patient and discussed management with the resident. I reviewed the resident’s note and agree with the documented findings and plan of care. Any areas of disagreement are noted on the chart. I was personally present for the key portions of any procedures. I have documented in the chart those procedures where I was not present during the key portions. I have reviewed the emergency nurses triage note. I agree with the chief complaint, past medical history, past surgical history, allergies, medications, social and family history as documented unless otherwise noted below.    For mid-level providers such as nurse practitioners as well as physicians assistants:    I have personally seen and evaluated the patient.    I find the patient's history and physical exam are consistent with NP/PA documentation.  I agree with the care provided, treatment rendered, disposition, & follow-up plan.     Additional findings are as noted.    Vital Signs: BP (!) 173/90   Pulse (!) 138   Temp 99.7 °F (37.6 °C) (Oral)   Resp 23   SpO2 95%   PCP:  No primary care provider on file.    Pertinent Comments:           Critical Care  None          Daniel Reno MD    Attending Emergency Medicine Physician            Ervin Reno MD  07/29/24 7370

## 2024-07-29 NOTE — ED PROVIDER NOTES
[facility] 1C STEPDOWN  Emergency Department Encounter  Emergency Medicine Resident     Pt Name:Moy Mckinney  MRN: [id number]  Birthdate 1965  Date of evaluation: 7/29/24  PCP:  No primary care provider on file.  Note Started: 6:06 PM EDT      CHIEF COMPLAINT       Chief Complaint   Patient presents with    Altered Mental Status     Found at [facility]       HISTORY OF PRESENT ILLNESS  (Location/Symptom, Timing/Onset, Context/Setting, Quality, Duration, Modifying Factors, Severity.)      Moy Mckinney is a 58 y.o. female who presents with Aphasia. Per EMS patient was found at [facility] and was unable to give a history. She is only able to say yes no and give them her first name. Per EMS patient was also found to be tachycardic no medications were given.  Patient is able to elaborate that she does have a prior stroke and had similar symptoms to this.    PAST MEDICAL / SURGICAL / SOCIAL / FAMILY HISTORY      has no past medical history on file.       has no past surgical history on file.      Social History     Socioeconomic History    Marital status: Single     Spouse name: Not on file    Number of children: Not on file    Years of education: Not on file    Highest education level: Not on file   Occupational History    Not on file   Tobacco Use    Smoking status: Never    Smokeless tobacco: Never   Substance and Sexual Activity    Alcohol use: Not Currently    Drug use: Never    Sexual activity: Not on file   Other Topics Concern    Not on file   Social History Narrative    Not on file     Social Determinants of Health     Financial Resource Strain: Not on file   Food Insecurity: Not on file   Transportation Needs: Not on file   Physical Activity: Not on file   Stress: Not on file   Social Connections: Not on file   Intimate Partner Violence: Not on file   Housing Stability: Not on file       No family history on file.    Allergies:  Patient has no known allergies.    Home

## 2024-07-30 ENCOUNTER — APPOINTMENT (OUTPATIENT)
Dept: GENERAL RADIOLOGY | Age: 59
DRG: 871 | End: 2024-07-30
Payer: COMMERCIAL

## 2024-07-30 PROBLEM — I67.2 INTRACRANIAL ATHEROSCLEROSIS: Status: ACTIVE | Noted: 2024-07-30

## 2024-07-30 PROBLEM — J18.9 PNEUMONIA OF RIGHT LOWER LOBE DUE TO INFECTIOUS ORGANISM: Status: ACTIVE | Noted: 2024-07-30

## 2024-07-30 PROBLEM — A41.9 SEPSIS WITHOUT ACUTE ORGAN DYSFUNCTION (HCC): Status: ACTIVE | Noted: 2024-07-30

## 2024-07-30 PROBLEM — Z91.148 HX OF MEDICATION NONCOMPLIANCE: Status: ACTIVE | Noted: 2024-07-30

## 2024-07-30 PROBLEM — I65.1 BASILAR ARTERY OCCLUSION: Status: ACTIVE | Noted: 2024-07-30

## 2024-07-30 PROBLEM — N30.90 CYSTITIS: Status: ACTIVE | Noted: 2024-07-30

## 2024-07-30 PROBLEM — E11.65 TYPE 2 DIABETES MELLITUS WITH HYPERGLYCEMIA, WITHOUT LONG-TERM CURRENT USE OF INSULIN (HCC): Status: ACTIVE | Noted: 2024-07-30

## 2024-07-30 PROBLEM — R47.01 APHASIA: Status: ACTIVE | Noted: 2024-07-30

## 2024-07-30 PROBLEM — Z86.73 HISTORY OF ISCHEMIC MIDDLE CEREBRAL ARTERY STROKE: Status: ACTIVE | Noted: 2024-07-30

## 2024-07-30 PROBLEM — I63.9 STROKE OF UNKNOWN ETIOLOGY (HCC): Status: RESOLVED | Noted: 2024-07-29 | Resolved: 2024-07-30

## 2024-07-30 LAB
ANION GAP SERPL CALCULATED.3IONS-SCNC: 13 MMOL/L (ref 9–16)
ANION GAP SERPL CALCULATED.3IONS-SCNC: 17 MMOL/L (ref 9–16)
BNP SERPL-MCNC: 209 PG/ML (ref 0–300)
BUN SERPL-MCNC: 7 MG/DL (ref 6–20)
BUN SERPL-MCNC: 8 MG/DL (ref 6–20)
CALCIUM SERPL-MCNC: 9.2 MG/DL (ref 8.6–10.4)
CALCIUM SERPL-MCNC: 9.3 MG/DL (ref 8.6–10.4)
CHLORIDE SERPL-SCNC: 104 MMOL/L (ref 98–107)
CHLORIDE SERPL-SCNC: 107 MMOL/L (ref 98–107)
CO2 SERPL-SCNC: 14 MMOL/L (ref 20–31)
CO2 SERPL-SCNC: 18 MMOL/L (ref 20–31)
CREAT SERPL-MCNC: 0.8 MG/DL (ref 0.5–0.9)
CREAT SERPL-MCNC: 0.9 MG/DL (ref 0.5–0.9)
EKG ATRIAL RATE: 35 BPM
EKG P AXIS: 32 DEGREES
EKG P-R INTERVAL: 166 MS
EKG Q-T INTERVAL: 328 MS
EKG QRS DURATION: 72 MS
EKG QTC CALCULATION (BAZETT): 441 MS
EKG R AXIS: -22 DEGREES
EKG T AXIS: -41 DEGREES
EKG VENTRICULAR RATE: 109 BPM
GFR, ESTIMATED: 75 ML/MIN/1.73M2
GFR, ESTIMATED: 81 ML/MIN/1.73M2
GLUCOSE BLD-MCNC: 226 MG/DL (ref 65–105)
GLUCOSE BLD-MCNC: 241 MG/DL (ref 65–105)
GLUCOSE BLD-MCNC: 254 MG/DL (ref 65–105)
GLUCOSE BLD-MCNC: 293 MG/DL (ref 65–105)
GLUCOSE BLD-MCNC: 323 MG/DL (ref 65–105)
GLUCOSE SERPL-MCNC: 217 MG/DL (ref 74–99)
GLUCOSE SERPL-MCNC: 330 MG/DL (ref 74–99)
HCT VFR BLD AUTO: 36.7 % (ref 36.3–47.1)
HGB BLD-MCNC: 12 G/DL (ref 11.9–15.1)
LACTIC ACID, WHOLE BLOOD: 3.9 MMOL/L (ref 0.7–2.1)
MCH RBC QN AUTO: 27 PG (ref 25.2–33.5)
MCV RBC AUTO: 82.7 FL (ref 82.6–102.9)
NRBC BLD-RTO: 0 PER 100 WBC
PLATELET # BLD AUTO: 346 K/UL (ref 138–453)
PMV BLD AUTO: 10.3 FL (ref 8.1–13.5)
POTASSIUM SERPL-SCNC: 4.2 MMOL/L (ref 3.7–5.3)
POTASSIUM SERPL-SCNC: 4.4 MMOL/L (ref 3.7–5.3)
PROCALCITONIN SERPL-MCNC: 0.08 NG/ML (ref 0–0.09)
RBC # BLD AUTO: 4.44 M/UL (ref 3.95–5.11)
SODIUM SERPL-SCNC: 135 MMOL/L (ref 136–145)
SODIUM SERPL-SCNC: 138 MMOL/L (ref 136–145)
TROPONIN I SERPL HS-MCNC: 14 NG/L (ref 0–14)
WBC OTHER # BLD: 13.2 K/UL (ref 3.5–11.3)

## 2024-07-30 PROCEDURE — 2060000000 HC ICU INTERMEDIATE R&B

## 2024-07-30 PROCEDURE — 6360000002 HC RX W HCPCS: Performed by: STUDENT IN AN ORGANIZED HEALTH CARE EDUCATION/TRAINING PROGRAM

## 2024-07-30 PROCEDURE — 6370000000 HC RX 637 (ALT 250 FOR IP)

## 2024-07-30 PROCEDURE — 93005 ELECTROCARDIOGRAM TRACING: CPT

## 2024-07-30 PROCEDURE — 83880 ASSAY OF NATRIURETIC PEPTIDE: CPT

## 2024-07-30 PROCEDURE — 6370000000 HC RX 637 (ALT 250 FOR IP): Performed by: STUDENT IN AN ORGANIZED HEALTH CARE EDUCATION/TRAINING PROGRAM

## 2024-07-30 PROCEDURE — 84484 ASSAY OF TROPONIN QUANT: CPT

## 2024-07-30 PROCEDURE — 97162 PT EVAL MOD COMPLEX 30 MIN: CPT

## 2024-07-30 PROCEDURE — 97166 OT EVAL MOD COMPLEX 45 MIN: CPT

## 2024-07-30 PROCEDURE — 99222 1ST HOSP IP/OBS MODERATE 55: CPT | Performed by: STUDENT IN AN ORGANIZED HEALTH CARE EDUCATION/TRAINING PROGRAM

## 2024-07-30 PROCEDURE — 82010 KETONE BODYS QUAN: CPT

## 2024-07-30 PROCEDURE — 92610 EVALUATE SWALLOWING FUNCTION: CPT

## 2024-07-30 PROCEDURE — 80048 BASIC METABOLIC PNL TOTAL CA: CPT

## 2024-07-30 PROCEDURE — 36415 COLL VENOUS BLD VENIPUNCTURE: CPT

## 2024-07-30 PROCEDURE — 6360000002 HC RX W HCPCS

## 2024-07-30 PROCEDURE — 82947 ASSAY GLUCOSE BLOOD QUANT: CPT

## 2024-07-30 PROCEDURE — 99223 1ST HOSP IP/OBS HIGH 75: CPT | Performed by: PSYCHIATRY & NEUROLOGY

## 2024-07-30 PROCEDURE — 99233 SBSQ HOSP IP/OBS HIGH 50: CPT | Performed by: PSYCHIATRY & NEUROLOGY

## 2024-07-30 PROCEDURE — 2580000003 HC RX 258

## 2024-07-30 PROCEDURE — 85027 COMPLETE CBC AUTOMATED: CPT

## 2024-07-30 PROCEDURE — 97530 THERAPEUTIC ACTIVITIES: CPT

## 2024-07-30 PROCEDURE — 71045 X-RAY EXAM CHEST 1 VIEW: CPT

## 2024-07-30 PROCEDURE — 97116 GAIT TRAINING THERAPY: CPT

## 2024-07-30 PROCEDURE — 93005 ELECTROCARDIOGRAM TRACING: CPT | Performed by: PSYCHIATRY & NEUROLOGY

## 2024-07-30 PROCEDURE — 84145 PROCALCITONIN (PCT): CPT

## 2024-07-30 PROCEDURE — 97112 NEUROMUSCULAR REEDUCATION: CPT

## 2024-07-30 PROCEDURE — 97535 SELF CARE MNGMENT TRAINING: CPT

## 2024-07-30 PROCEDURE — 92523 SPEECH SOUND LANG COMPREHEN: CPT

## 2024-07-30 PROCEDURE — 83605 ASSAY OF LACTIC ACID: CPT

## 2024-07-30 PROCEDURE — 2580000003 HC RX 258: Performed by: STUDENT IN AN ORGANIZED HEALTH CARE EDUCATION/TRAINING PROGRAM

## 2024-07-30 RX ORDER — SODIUM CHLORIDE 9 MG/ML
INJECTION, SOLUTION INTRAVENOUS CONTINUOUS
Status: DISCONTINUED | OUTPATIENT
Start: 2024-07-30 | End: 2024-08-01

## 2024-07-30 RX ORDER — METOPROLOL TARTRATE 1 MG/ML
2.5 INJECTION, SOLUTION INTRAVENOUS EVERY 6 HOURS PRN
Status: DISCONTINUED | OUTPATIENT
Start: 2024-07-30 | End: 2024-07-30

## 2024-07-30 RX ORDER — INSULIN GLARGINE 100 [IU]/ML
15 INJECTION, SOLUTION SUBCUTANEOUS DAILY
Status: DISCONTINUED | OUTPATIENT
Start: 2024-07-30 | End: 2024-07-31

## 2024-07-30 RX ORDER — INSULIN LISPRO 100 [IU]/ML
0-16 INJECTION, SOLUTION INTRAVENOUS; SUBCUTANEOUS
Status: DISCONTINUED | OUTPATIENT
Start: 2024-07-30 | End: 2024-08-01 | Stop reason: HOSPADM

## 2024-07-30 RX ORDER — CARVEDILOL 12.5 MG/1
12.5 TABLET ORAL 2 TIMES DAILY WITH MEALS
Status: DISCONTINUED | OUTPATIENT
Start: 2024-07-30 | End: 2024-07-31

## 2024-07-30 RX ORDER — ALPRAZOLAM 0.25 MG/1
0.25 TABLET ORAL NIGHTLY PRN
Status: COMPLETED | OUTPATIENT
Start: 2024-07-30 | End: 2024-07-31

## 2024-07-30 RX ORDER — ALPRAZOLAM 0.25 MG/1
0.25 TABLET ORAL ONCE
Status: COMPLETED | OUTPATIENT
Start: 2024-07-30 | End: 2024-07-30

## 2024-07-30 RX ORDER — INSULIN LISPRO 100 [IU]/ML
0-4 INJECTION, SOLUTION INTRAVENOUS; SUBCUTANEOUS NIGHTLY
Status: DISCONTINUED | OUTPATIENT
Start: 2024-07-30 | End: 2024-08-01 | Stop reason: HOSPADM

## 2024-07-30 RX ORDER — LISINOPRIL 10 MG/1
10 TABLET ORAL DAILY
Status: DISCONTINUED | OUTPATIENT
Start: 2024-07-30 | End: 2024-08-01

## 2024-07-30 RX ORDER — SENNOSIDES A AND B 8.6 MG/1
1 TABLET, FILM COATED ORAL ONCE
Status: COMPLETED | OUTPATIENT
Start: 2024-07-30 | End: 2024-07-30

## 2024-07-30 RX ORDER — LABETALOL HYDROCHLORIDE 5 MG/ML
10 INJECTION, SOLUTION INTRAVENOUS EVERY 4 HOURS PRN
Status: DISCONTINUED | OUTPATIENT
Start: 2024-07-30 | End: 2024-08-01 | Stop reason: HOSPADM

## 2024-07-30 RX ORDER — HYDRALAZINE HYDROCHLORIDE 20 MG/ML
10 INJECTION INTRAMUSCULAR; INTRAVENOUS EVERY 4 HOURS PRN
Status: DISCONTINUED | OUTPATIENT
Start: 2024-07-30 | End: 2024-08-01 | Stop reason: HOSPADM

## 2024-07-30 RX ADMIN — METOPROLOL TARTRATE 25 MG: 25 TABLET, FILM COATED ORAL at 09:35

## 2024-07-30 RX ADMIN — LABETALOL HYDROCHLORIDE 10 MG: 5 INJECTION, SOLUTION INTRAVENOUS at 01:51

## 2024-07-30 RX ADMIN — SODIUM CHLORIDE: 9 INJECTION, SOLUTION INTRAVENOUS at 18:44

## 2024-07-30 RX ADMIN — HEPARIN SODIUM 5000 UNITS: 5000 INJECTION INTRAVENOUS; SUBCUTANEOUS at 14:52

## 2024-07-30 RX ADMIN — INSULIN LISPRO 2 UNITS: 100 INJECTION, SOLUTION INTRAVENOUS; SUBCUTANEOUS at 09:09

## 2024-07-30 RX ADMIN — AZITHROMYCIN DIHYDRATE 500 MG: 500 INJECTION, POWDER, LYOPHILIZED, FOR SOLUTION INTRAVENOUS at 11:21

## 2024-07-30 RX ADMIN — LABETALOL HYDROCHLORIDE 10 MG: 5 INJECTION, SOLUTION INTRAVENOUS at 16:42

## 2024-07-30 RX ADMIN — ATORVASTATIN CALCIUM 80 MG: 80 TABLET, FILM COATED ORAL at 21:15

## 2024-07-30 RX ADMIN — ASPIRIN 81 MG: 81 TABLET, COATED ORAL at 07:24

## 2024-07-30 RX ADMIN — HEPARIN SODIUM 5000 UNITS: 5000 INJECTION INTRAVENOUS; SUBCUTANEOUS at 22:19

## 2024-07-30 RX ADMIN — LISINOPRIL 10 MG: 10 TABLET ORAL at 12:40

## 2024-07-30 RX ADMIN — SODIUM CHLORIDE, PRESERVATIVE FREE 10 ML: 5 INJECTION INTRAVENOUS at 21:06

## 2024-07-30 RX ADMIN — CARVEDILOL 12.5 MG: 12.5 TABLET, FILM COATED ORAL at 16:42

## 2024-07-30 RX ADMIN — LABETALOL HYDROCHLORIDE 10 MG: 5 INJECTION, SOLUTION INTRAVENOUS at 07:35

## 2024-07-30 RX ADMIN — SODIUM CHLORIDE: 9 INJECTION, SOLUTION INTRAVENOUS at 02:24

## 2024-07-30 RX ADMIN — SODIUM CHLORIDE, PRESERVATIVE FREE 10 ML: 5 INJECTION INTRAVENOUS at 07:24

## 2024-07-30 RX ADMIN — HEPARIN SODIUM 5000 UNITS: 5000 INJECTION INTRAVENOUS; SUBCUTANEOUS at 06:24

## 2024-07-30 RX ADMIN — POLYETHYLENE GLYCOL 3350 17 G: 17 POWDER, FOR SOLUTION ORAL at 12:01

## 2024-07-30 RX ADMIN — INSULIN LISPRO 12 UNITS: 100 INJECTION, SOLUTION INTRAVENOUS; SUBCUTANEOUS at 11:54

## 2024-07-30 RX ADMIN — CARVEDILOL 12.5 MG: 12.5 TABLET, FILM COATED ORAL at 11:21

## 2024-07-30 RX ADMIN — Medication 1000 MG: at 22:30

## 2024-07-30 RX ADMIN — INSULIN GLARGINE 15 UNITS: 100 INJECTION, SOLUTION SUBCUTANEOUS at 11:14

## 2024-07-30 RX ADMIN — SENNOSIDES 8.6 MG: 8.6 TABLET, FILM COATED ORAL at 21:15

## 2024-07-30 RX ADMIN — INSULIN LISPRO 4 UNITS: 100 INJECTION, SOLUTION INTRAVENOUS; SUBCUTANEOUS at 16:56

## 2024-07-30 RX ADMIN — CLOPIDOGREL BISULFATE 75 MG: 75 TABLET ORAL at 07:24

## 2024-07-30 RX ADMIN — ALPRAZOLAM 0.25 MG: 0.25 TABLET ORAL at 16:42

## 2024-07-30 RX ADMIN — HYDRALAZINE HYDROCHLORIDE 10 MG: 20 INJECTION, SOLUTION INTRAMUSCULAR; INTRAVENOUS at 10:14

## 2024-07-30 RX ADMIN — SODIUM CHLORIDE: 9 INJECTION, SOLUTION INTRAVENOUS at 16:43

## 2024-07-30 NOTE — CARE COORDINATION
Case Management Assessment  Initial Evaluation    Date/Time of Evaluation: 7/30/2024 4:08 PM  Assessment Completed by: Vianney Oliver RN    If patient is discharged prior to next notation, then this note serves as note for discharge by case management.    Patient Name: Moy Mckinney                   YOB: 1965  Diagnosis: Stroke of unknown etiology (HCC) [I63.9]                   Date / Time: 7/29/2024  4:27 PM    Patient Admission Status: Inpatient   Readmission Risk (Low < 19, Mod (19-27), High > 27): Readmission Risk Score: 13.2    Current PCP: No primary care provider on file.  PCP verified by CM? (P) Yes (Iona Arroyo NP)    Chart Reviewed: Yes      History Provided by: Patient  Patient Orientation: Alert and Oriented, Person, Place    Patient Cognition: Alert    Hospitalization in the last 30 days (Readmission):  No    If yes, Readmission Assessment in CM Navigator will be completed.    Advance Directives:      Code Status: Full Code   Patient's Primary Decision Maker is: Legal Next of Kin      Discharge Planning:    Patient lives with: (P) Children Type of Home: (P) Apartment  Primary Care Giver: Self  Patient Support Systems include: Family Members, Children   Current Financial resources: (P) Medicaid, Medicare  Current community resources: (P) None  Current services prior to admission: (P) None            Current DME:              Type of Home Care services:  (P) None    ADLS  Prior functional level: (P) Independent in ADLs/IADLs  Current functional level: (P) Assistance with the following:, Bathing, Dressing, Toileting, Mobility    PT AM-PAC: 18 /24  OT AM-PAC: 20 /24    Family can provide assistance at DC: (P) Yes  Would you like Case Management to discuss the discharge plan with any other family members/significant others, and if so, who? (P) No  Plans to Return to Present Housing: (P) Yes  Other Identified Issues/Barriers to RETURNING to current housing: current medical condition

## 2024-07-30 NOTE — PLAN OF CARE
Problem: Discharge Planning  Goal: Discharge to home or other facility with appropriate resources  7/30/2024 1759 by Jose J Dougherty RN  Outcome: Progressing  Flowsheets  Taken 7/30/2024 1200 by Jose J Dougherty RN  Discharge to home or other facility with appropriate resources: Identify barriers to discharge with patient and caregiver  Taken 7/30/2024 0800 by Pedro Benavidez, RN  Discharge to home or other facility with appropriate resources:   Identify barriers to discharge with patient and caregiver   Arrange for needed discharge resources and transportation as appropriate   Identify discharge learning needs (meds, wound care, etc)   Refer to discharge planning if patient needs post-hospital services based on physician order or complex needs related to functional status, cognitive ability or social support system  7/30/2024 0634 by Sonya Bee, RN  Outcome: Progressing     Problem: Safety - Adult  Goal: Free from fall injury  7/30/2024 1759 by Jose J Dougherty RN  Outcome: Progressing  Flowsheets (Taken 7/30/2024 0822 by Pedro Benavidez, RN)  Free From Fall Injury: Instruct family/caregiver on patient safety  7/30/2024 0634 by Sonya Bee, RN  Outcome: Progressing     Problem: Chronic Conditions and Co-morbidities  Goal: Patient's chronic conditions and co-morbidity symptoms are monitored and maintained or improved  Outcome: Progressing  Flowsheets (Taken 7/30/2024 1200)  Care Plan - Patient's Chronic Conditions and Co-Morbidity Symptoms are Monitored and Maintained or Improved: Monitor and assess patient's chronic conditions and comorbid symptoms for stability, deterioration, or improvement

## 2024-07-30 NOTE — H&P
nonspecific periventricular and subcortical white matter T2 prolongation which is most commonly attributed to small vessel ischemic disease.     *No acute intracranial findings. *Please see above for more details.     CT CHEST PULMONARY EMBOLISM W CONTRAST    Result Date: 7/29/2024  EXAMINATION: CTA OF THE CHEST 7/29/2024 5:00 pm TECHNIQUE: CTA of the chest was performed after the administration of intravenous contrast.  Multiplanar reformatted images are provided for review.  MIP images are provided for review. Automated exposure control, iterative reconstruction, and/or weight based adjustment of the mA/kV was utilized to reduce the radiation dose to as low as reasonably achievable. COMPARISON: None. HISTORY: ORDERING SYSTEM PROVIDED HISTORY: tachycardia Decision Support Exception - unselect if not a suspected or confirmed emergency medical condition->Emergency Medical Condition (MA) FINDINGS: Pulmonary Arteries: Pulmonary arteries are adequately opacified for evaluation.  No evidence of intraluminal filling defect to suggest pulmonary embolism.  Main pulmonary artery is normal in caliber, 25 mm. Mediastinum: No evidence of mediastinal lymphadenopathy.  The heart and pericardium demonstrate no acute abnormality.  There is no acute abnormality of the thoracic aorta.  The ascending thoracic aorta is 28 mm, descending thoracic aorta 24 mm. Lungs/pleura: The lungs are without acute process.  No suspicious soft tissue pulmonary nodule.  No ground-glass opacification.  No focal consolidation or findings of pulmonary edema.  No evidence of pleural effusion or pneumothorax. No inspissated secretions or endobronchial lesion evident. Upper Abdomen: Limited images of the upper abdomen are unremarkable. Soft Tissues/Bones: No acute bone or soft tissue abnormality.  Chronic appearing, more left than right, compression of the central portion of the superior L1 endplate with prominent central Schmorl's node at the superior

## 2024-07-31 PROBLEM — R29.90 STROKE-LIKE SYMPTOMS: Status: ACTIVE | Noted: 2024-07-31

## 2024-07-31 PROBLEM — I10 HYPERTENSION NOT AT GOAL: Status: ACTIVE | Noted: 2024-07-31

## 2024-07-31 LAB
B-OH-BUTYR SERPL-MCNC: 0.12 MMOL/L (ref 0.02–0.27)
ERYTHROCYTE [DISTWIDTH] IN BLOOD BY AUTOMATED COUNT: 13.9 % (ref 11.8–14.4)
GLUCOSE BLD-MCNC: 173 MG/DL (ref 65–105)
GLUCOSE BLD-MCNC: 195 MG/DL (ref 65–105)
GLUCOSE BLD-MCNC: 251 MG/DL (ref 65–105)
GLUCOSE BLD-MCNC: 302 MG/DL (ref 65–105)
HCT VFR BLD AUTO: 36.7 % (ref 36.3–47.1)
HGB BLD-MCNC: 11.5 G/DL (ref 11.9–15.1)
MCH RBC QN AUTO: 26.9 PG (ref 25.2–33.5)
MCHC RBC AUTO-ENTMCNC: 31.3 G/DL (ref 28.4–34.8)
MCV RBC AUTO: 85.9 FL (ref 82.6–102.9)
NRBC BLD-RTO: 0 PER 100 WBC
PLATELET # BLD AUTO: 328 K/UL (ref 138–453)
PMV BLD AUTO: 10.2 FL (ref 8.1–13.5)
RBC # BLD AUTO: 4.27 M/UL (ref 3.95–5.11)
WBC OTHER # BLD: 10.7 K/UL (ref 3.5–11.3)

## 2024-07-31 PROCEDURE — 6360000002 HC RX W HCPCS

## 2024-07-31 PROCEDURE — 2580000003 HC RX 258: Performed by: STUDENT IN AN ORGANIZED HEALTH CARE EDUCATION/TRAINING PROGRAM

## 2024-07-31 PROCEDURE — 2580000003 HC RX 258

## 2024-07-31 PROCEDURE — 6370000000 HC RX 637 (ALT 250 FOR IP)

## 2024-07-31 PROCEDURE — 36415 COLL VENOUS BLD VENIPUNCTURE: CPT

## 2024-07-31 PROCEDURE — 2060000000 HC ICU INTERMEDIATE R&B

## 2024-07-31 PROCEDURE — 99232 SBSQ HOSP IP/OBS MODERATE 35: CPT | Performed by: INTERNAL MEDICINE

## 2024-07-31 PROCEDURE — 6360000002 HC RX W HCPCS: Performed by: STUDENT IN AN ORGANIZED HEALTH CARE EDUCATION/TRAINING PROGRAM

## 2024-07-31 PROCEDURE — 82947 ASSAY GLUCOSE BLOOD QUANT: CPT

## 2024-07-31 PROCEDURE — 85027 COMPLETE CBC AUTOMATED: CPT

## 2024-07-31 PROCEDURE — 99232 SBSQ HOSP IP/OBS MODERATE 35: CPT | Performed by: PSYCHIATRY & NEUROLOGY

## 2024-07-31 PROCEDURE — 6370000000 HC RX 637 (ALT 250 FOR IP): Performed by: STUDENT IN AN ORGANIZED HEALTH CARE EDUCATION/TRAINING PROGRAM

## 2024-07-31 RX ORDER — CARVEDILOL 25 MG/1
25 TABLET ORAL 2 TIMES DAILY WITH MEALS
Status: DISCONTINUED | OUTPATIENT
Start: 2024-08-01 | End: 2024-08-01 | Stop reason: HOSPADM

## 2024-07-31 RX ORDER — ALPRAZOLAM 0.25 MG/1
0.25 TABLET ORAL
Status: COMPLETED | OUTPATIENT
Start: 2024-07-31 | End: 2024-07-31

## 2024-07-31 RX ORDER — INSULIN GLARGINE 100 [IU]/ML
18 INJECTION, SOLUTION SUBCUTANEOUS DAILY
Status: DISCONTINUED | OUTPATIENT
Start: 2024-08-01 | End: 2024-08-01 | Stop reason: HOSPADM

## 2024-07-31 RX ADMIN — INSULIN GLARGINE 15 UNITS: 100 INJECTION, SOLUTION SUBCUTANEOUS at 08:23

## 2024-07-31 RX ADMIN — SODIUM CHLORIDE, PRESERVATIVE FREE 10 ML: 5 INJECTION INTRAVENOUS at 08:19

## 2024-07-31 RX ADMIN — CARVEDILOL 12.5 MG: 12.5 TABLET, FILM COATED ORAL at 16:41

## 2024-07-31 RX ADMIN — SODIUM CHLORIDE, PRESERVATIVE FREE 10 ML: 5 INJECTION INTRAVENOUS at 22:34

## 2024-07-31 RX ADMIN — ASPIRIN 81 MG: 81 TABLET, COATED ORAL at 08:23

## 2024-07-31 RX ADMIN — SODIUM CHLORIDE: 9 INJECTION, SOLUTION INTRAVENOUS at 16:36

## 2024-07-31 RX ADMIN — HEPARIN SODIUM 5000 UNITS: 5000 INJECTION INTRAVENOUS; SUBCUTANEOUS at 06:25

## 2024-07-31 RX ADMIN — CLOPIDOGREL BISULFATE 75 MG: 75 TABLET ORAL at 08:23

## 2024-07-31 RX ADMIN — AZITHROMYCIN DIHYDRATE 500 MG: 500 INJECTION, POWDER, LYOPHILIZED, FOR SOLUTION INTRAVENOUS at 10:32

## 2024-07-31 RX ADMIN — ALPRAZOLAM 0.25 MG: 0.25 TABLET ORAL at 00:33

## 2024-07-31 RX ADMIN — LABETALOL HYDROCHLORIDE 10 MG: 5 INJECTION, SOLUTION INTRAVENOUS at 16:36

## 2024-07-31 RX ADMIN — HEPARIN SODIUM 5000 UNITS: 5000 INJECTION INTRAVENOUS; SUBCUTANEOUS at 22:33

## 2024-07-31 RX ADMIN — LISINOPRIL 10 MG: 10 TABLET ORAL at 08:23

## 2024-07-31 RX ADMIN — ALPRAZOLAM 0.25 MG: 0.25 TABLET ORAL at 22:33

## 2024-07-31 RX ADMIN — POLYETHYLENE GLYCOL 3350 17 G: 17 POWDER, FOR SOLUTION ORAL at 08:25

## 2024-07-31 RX ADMIN — INSULIN LISPRO 8 UNITS: 100 INJECTION, SOLUTION INTRAVENOUS; SUBCUTANEOUS at 16:41

## 2024-07-31 RX ADMIN — ATORVASTATIN CALCIUM 80 MG: 80 TABLET, FILM COATED ORAL at 22:33

## 2024-07-31 RX ADMIN — INSULIN LISPRO 12 UNITS: 100 INJECTION, SOLUTION INTRAVENOUS; SUBCUTANEOUS at 13:00

## 2024-07-31 RX ADMIN — CARVEDILOL 12.5 MG: 12.5 TABLET, FILM COATED ORAL at 08:23

## 2024-07-31 RX ADMIN — HEPARIN SODIUM 5000 UNITS: 5000 INJECTION INTRAVENOUS; SUBCUTANEOUS at 14:09

## 2024-07-31 RX ADMIN — SODIUM CHLORIDE: 9 INJECTION, SOLUTION INTRAVENOUS at 04:53

## 2024-07-31 NOTE — CONSULTS
Endovascular Neurosurgery Consult    [facility]  MRN: [id number]  YOB: 1881  Date of evaluation: 7/29/2024  Primary Care Physician: No primary care provider on file.  Patient evaluated at the request of  Dr. Daniel Reno MD    Reason for evaluation: basilar artery occlusion    SUBJECTIVE:   History of Chief Complaint:      The patient is a 58 y.o. female With no medical problem on chart, per signout , patient had ischemic stroke before, not know what medication she is on, but with residual aphasia and right upper extremities weakness presented due to aphasia, per sister, patient living at home, she called her at 3:30 PM, noticed patient was unable to express word, last known well unknown, patient was found by EMS as unable to give a history, she was only able to say yes or no question, per EMS patient was found tachycardia but no medication was given, blood glucose on presentation was 359, lactic acid 4.3.  Blood pressure 149, NIH 3 for level of consciousness and moderate aphasia, TNK was not given due to unknown last known well, CT head did not show any acute abnormality, CTA did show basilar artery occlusion,  With no acute intracranial finding with MRI limited brain.          LKW: Unknown  NIHSS: 3  Modified Trimble Scale: 0  SBP: 149  Glucose: 359  CT head without contrast: No acute  Abnormality  TNK: Not candidate  Endovascular: Basilar artery occlusion    Allergies  has No Known Allergies.  Medications  Prior to Admission medications    Not on File    Scheduled Meds:   sodium chloride  1,000 mL IntraVENous Once     Continuous Infusions:  PRN Meds:.  Past Medical History   has no past medical history on file.  Past Surgical History   has no past surgical history on file.  Social History   has no history on file for tobacco use.   has no history on file for alcohol use.   has no history on file for drug use.  Family History  family history is not on file.    Review of 
  Department of Neurology/Telestroke/Stroke  Resident Consult Note  Stroke Alert @4:55 PM  Arrival at bedside @5 PM    Reason for Consult:  ED Stroke Alert  Requesting Physician:  Ervin Reno MD  Endovascular Neurosurgeon:   Gera Olivera MD    Stroke Team:  Gera Olivera MD      History Obtained From:  patient, patient's sister, electronic medical record    CHIEF COMPLAINT:       Aphasia    HISTORY OF PRESENT ILLNESS:       The patient is a 58 y.o. female With no medical problem on chart, per signout , patient had ischemic stroke before, not know what medication she is on, but with residual aphasia and right upper extremities weakness presented due to aphasia, per sister, patient living at home, she called her at 3:30 PM, noticed patient was unable to express word, last known well unknown, patient was found by EMS as unable to give a history, she was only able to say yes or no question, per EMS patient was found tachycardia but no medication was given, blood glucose on presentation was 359, lactic acid 4.3.  Blood pressure 149, NIH 3 for level of consciousness and moderate aphasia, TNK was not given due to unknown last known well, CT head did not show any acute abnormality, CTA did show basilar artery occlusion,  With no acute intracranial finding with MRI limited brain.        LKW: Unknown  NIHSS: 3  Modified Kershaw Scale: 0  SBP: 149  Glucose: 359  CT head without contrast: No acute  Abnormality  TNK: Not candidate  Endovascular: Basilar artery occlusion       PAST MEDICAL HISTORY :       Past Medical History:    No past medical history on file.    Past Surgical History:    No past surgical history on file.    Social History:   Social History     Socioeconomic History    Marital status: Single     Spouse name: Not on file    Number of children: Not on file    Years of education: Not on file    Highest education level: Not on file   Occupational History    Not on file   Tobacco Use    Smoking status: Not on 
Physical Medicine & Rehabilitation  Consult Note      Admitting Physician: Farheen Aaron DO    Primary Care Provider: No primary care provider on file.     Reason for Consult:  Acute Inpatient Rehabilitation    Chief Complaint: Altered mental status    History of Present Illness:  Referring Provider is requesting an evaluation for appropriate placement upon discharge from acute care.     Ms. Moy Mckinney is a 58 y.o.  female who was admitted to [facility] on 7/29/2024 with Altered Mental Status (Found at [facility])    58-year-old female history of ischemic stroke with residual aphasia and right upper extremity weakness presented with increased aphasia.  She was found to be tachycardic CT head negative CTA did show basilar artery occlusion no new cranial findings with MRI limited brain    Endovascular MRI no acute stroke, etc. 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  There is strong clinical concern for developing infiltrate at the right base, short-term follow-up chest x-ray suggested.     MRI LIMITED BRAIN    Result Date: 7/29/2024  *No acute intracranial findings. *Please see above for more details.     CT CHEST PULMONARY EMBOLISM W CONTRAST    Result Date: 7/29/2024  1.  No acute pulmonary embolism. 2. No acute pulmonary disease.     CTA HEAD NECK W CONTRAST    Addendum Date: 7/29/2024    ADDENDUM: Case was discussed with Dr. Ye at 6:13 p.m.     Result Date: 7/29/2024  Basilar artery occlusion. RECOMMENDATIONS: The findings were sent to the Radiology Results 
  Result Value Ref Range    Hemoglobin A1C 9.9 (H) 4.0 - 6.0 %    Estimated Avg Glucose 237 mg/dL   Lipid Panel    Collection Time: 07/29/24  4:49 PM   Result Value Ref Range    Cholesterol, Total 139 0 - 199 mg/dL    HDL 38 (L) >40 mg/dL    LDL Cholesterol 76 0 - 100 mg/dL    Chol/HDL Ratio 4.0     Triglycerides 122 <150 mg/dL    VLDL 24 mg/dL   Troponin    Collection Time: 07/29/24  5:39 PM   Result Value Ref Range    Troponin, High Sensitivity 10 0 - 14 ng/L   Culture, Blood 1    Collection Time: 07/29/24  9:43 PM    Specimen: Blood   Result Value Ref Range    Specimen Description .BLOOD     Special Requests R HAND 2ML     Culture NO GROWTH 12 HOURS    POC Glucose Fingerstick    Collection Time: 07/29/24  9:57 PM   Result Value Ref Range    POC Glucose 189 (H) 65 - 105 mg/dL   EKG 12 Lead    Collection Time: 07/30/24  2:06 AM   Result Value Ref Range    Ventricular Rate 109 BPM    Atrial Rate 35 BPM    P-R Interval 166 ms    QRS Duration 72 ms    Q-T Interval 328 ms    QTc Calculation (Bazett) 441 ms    P Axis 32 degrees    R Axis -22 degrees    T Axis -41 degrees   POC Glucose Fingerstick    Collection Time: 07/30/24  2:44 AM   Result Value Ref Range    POC Glucose 254 (H) 65 - 105 mg/dL   Brain Natriuretic Peptide    Collection Time: 07/30/24  5:11 AM   Result Value Ref Range    Pro- 0 - 300 pg/mL   CBC    Collection Time: 07/30/24  5:11 AM   Result Value Ref Range    WBC 13.2 (H) 3.5 - 11.3 k/uL    RBC 4.44 3.95 - 5.11 m/uL    Hemoglobin 12.0 11.9 - 15.1 g/dL    Hematocrit 36.7 36.3 - 47.1 %    MCV 82.7 82.6 - 102.9 fL    MCH 27.0 25.2 - 33.5 pg    MCHC 32.7 28.4 - 34.8 g/dL    RDW 13.6 11.8 - 14.4 %    Platelets 346 138 - 453 k/uL    MPV 10.3 8.1 - 13.5 fL    NRBC Automated 0.0 0.0 per 100 WBC   Procalcitonin    Collection Time: 07/30/24  5:11 AM   Result Value Ref Range    Procalcitonin 0.08 0.00 - 0.09 ng/mL   Troponin    Collection Time: 07/30/24  5:11 AM   Result Value Ref Range    Troponin, High

## 2024-07-31 NOTE — PLAN OF CARE
Problem: Discharge Planning  Goal: Discharge to home or other facility with appropriate resources  7/31/2024 1818 by Jose J Dougherty RN  Outcome: Progressing  7/31/2024 0616 by Mitchell Garcias RN  Outcome: Progressing  Flowsheets (Taken 7/30/2024 2000)  Discharge to home or other facility with appropriate resources: Identify barriers to discharge with patient and caregiver     Problem: Safety - Adult  Goal: Free from fall injury  7/31/2024 1818 by Jose J Dougherty RN  Outcome: Progressing  Flowsheets (Taken 7/31/2024 0800)  Free From Fall Injury: Instruct family/caregiver on patient safety  7/31/2024 0616 by Mitchell Garcias RN  Outcome: Progressing  Flowsheets (Taken 7/30/2024 2000)  Free From Fall Injury: Instruct family/caregiver on patient safety     Problem: Chronic Conditions and Co-morbidities  Goal: Patient's chronic conditions and co-morbidity symptoms are monitored and maintained or improved  7/31/2024 1818 by Jose J Dougherty RN  Outcome: Progressing  7/31/2024 0616 by Mitchell Garcias RN  Outcome: Progressing  Flowsheets (Taken 7/30/2024 2000)  Care Plan - Patient's Chronic Conditions and Co-Morbidity Symptoms are Monitored and Maintained or Improved: Monitor and assess patient's chronic conditions and comorbid symptoms for stability, deterioration, or improvement

## 2024-07-31 NOTE — PLAN OF CARE
Problem: Discharge Planning  Goal: Discharge to home or other facility with appropriate resources  7/31/2024 0616 by Mitchell Garcias, RN  Outcome: Progressing  Flowsheets (Taken 7/30/2024 2000)  Discharge to home or other facility with appropriate resources: Identify barriers to discharge with patient and caregiver     Problem: Safety - Adult  Goal: Free from fall injury  7/31/2024 0616 by Mitchell Garcias, RN  Outcome: Progressing  Flowsheets (Taken 7/30/2024 2000)  Free From Fall Injury: Instruct family/caregiver on patient safety     Problem: Chronic Conditions and Co-morbidities  Goal: Patient's chronic conditions and co-morbidity symptoms are monitored and maintained or improved  7/31/2024 0616 by Mitchell Garcias, RN  Outcome: Progressing  Flowsheets (Taken 7/30/2024 2000)  Care Plan - Patient's Chronic Conditions and Co-Morbidity Symptoms are Monitored and Maintained or Improved: Monitor and assess patient's chronic conditions and comorbid symptoms for stability, deterioration, or improvement

## 2024-08-01 VITALS
OXYGEN SATURATION: 97 % | RESPIRATION RATE: 20 BRPM | HEART RATE: 86 BPM | WEIGHT: 173.72 LBS | TEMPERATURE: 98.8 F | BODY MASS INDEX: 31.97 KG/M2 | SYSTOLIC BLOOD PRESSURE: 151 MMHG | DIASTOLIC BLOOD PRESSURE: 103 MMHG | HEIGHT: 62 IN

## 2024-08-01 LAB
EKG ATRIAL RATE: 106 BPM
EKG ATRIAL RATE: 149 BPM
EKG P AXIS: 39 DEGREES
EKG P-R INTERVAL: 172 MS
EKG P-R INTERVAL: 88 MS
EKG Q-T INTERVAL: 332 MS
EKG Q-T INTERVAL: 350 MS
EKG QRS DURATION: 74 MS
EKG QRS DURATION: 76 MS
EKG QTC CALCULATION (BAZETT): 441 MS
EKG QTC CALCULATION (BAZETT): 551 MS
EKG R AXIS: -20 DEGREES
EKG R AXIS: -7 DEGREES
EKG T AXIS: -5 DEGREES
EKG T AXIS: 67 DEGREES
EKG VENTRICULAR RATE: 106 BPM
EKG VENTRICULAR RATE: 149 BPM
ERYTHROCYTE [DISTWIDTH] IN BLOOD BY AUTOMATED COUNT: 13.6 % (ref 11.8–14.4)
GLUCOSE BLD-MCNC: 184 MG/DL (ref 65–105)
GLUCOSE BLD-MCNC: 209 MG/DL (ref 65–105)
HCT VFR BLD AUTO: 41.6 % (ref 36.3–47.1)
HGB BLD-MCNC: 12 G/DL (ref 11.9–15.1)
MCH RBC QN AUTO: 26.6 PG (ref 25.2–33.5)
MCHC RBC AUTO-ENTMCNC: 28.8 G/DL (ref 28.4–34.8)
MCV RBC AUTO: 92.2 FL (ref 82.6–102.9)
NRBC BLD-RTO: 0 PER 100 WBC
PLATELET # BLD AUTO: 305 K/UL (ref 138–453)
PMV BLD AUTO: 10 FL (ref 8.1–13.5)
RBC # BLD AUTO: 4.51 M/UL (ref 3.95–5.11)
WBC OTHER # BLD: 11.6 K/UL (ref 3.5–11.3)

## 2024-08-01 PROCEDURE — 6360000002 HC RX W HCPCS

## 2024-08-01 PROCEDURE — 99232 SBSQ HOSP IP/OBS MODERATE 35: CPT | Performed by: INTERNAL MEDICINE

## 2024-08-01 PROCEDURE — 82947 ASSAY GLUCOSE BLOOD QUANT: CPT

## 2024-08-01 PROCEDURE — 6370000000 HC RX 637 (ALT 250 FOR IP)

## 2024-08-01 PROCEDURE — 97112 NEUROMUSCULAR REEDUCATION: CPT

## 2024-08-01 PROCEDURE — 6370000000 HC RX 637 (ALT 250 FOR IP): Performed by: INTERNAL MEDICINE

## 2024-08-01 PROCEDURE — 85027 COMPLETE CBC AUTOMATED: CPT

## 2024-08-01 PROCEDURE — 97535 SELF CARE MNGMENT TRAINING: CPT

## 2024-08-01 PROCEDURE — 6360000002 HC RX W HCPCS: Performed by: STUDENT IN AN ORGANIZED HEALTH CARE EDUCATION/TRAINING PROGRAM

## 2024-08-01 PROCEDURE — 36415 COLL VENOUS BLD VENIPUNCTURE: CPT

## 2024-08-01 PROCEDURE — 2580000003 HC RX 258: Performed by: STUDENT IN AN ORGANIZED HEALTH CARE EDUCATION/TRAINING PROGRAM

## 2024-08-01 PROCEDURE — 97116 GAIT TRAINING THERAPY: CPT

## 2024-08-01 PROCEDURE — 97530 THERAPEUTIC ACTIVITIES: CPT

## 2024-08-01 PROCEDURE — 97110 THERAPEUTIC EXERCISES: CPT

## 2024-08-01 RX ORDER — ASPIRIN 81 MG/1
81 TABLET ORAL DAILY
Qty: 30 TABLET | Refills: 3 | Status: SHIPPED | OUTPATIENT
Start: 2024-08-02

## 2024-08-01 RX ORDER — LISINOPRIL 20 MG/1
20 TABLET ORAL DAILY
Status: DISCONTINUED | OUTPATIENT
Start: 2024-08-02 | End: 2024-08-01 | Stop reason: HOSPADM

## 2024-08-01 RX ORDER — CARVEDILOL 25 MG/1
25 TABLET ORAL 2 TIMES DAILY WITH MEALS
Qty: 60 TABLET | Refills: 3 | Status: SHIPPED | OUTPATIENT
Start: 2024-08-01

## 2024-08-01 RX ORDER — ATORVASTATIN CALCIUM 80 MG/1
80 TABLET, FILM COATED ORAL NIGHTLY
Qty: 30 TABLET | Refills: 3 | Status: SHIPPED | OUTPATIENT
Start: 2024-08-01

## 2024-08-01 RX ORDER — LISINOPRIL 20 MG/1
20 TABLET ORAL DAILY
Qty: 30 TABLET | Refills: 3 | Status: SHIPPED | OUTPATIENT
Start: 2024-08-02

## 2024-08-01 RX ORDER — CLOPIDOGREL BISULFATE 75 MG/1
75 TABLET ORAL DAILY
Qty: 30 TABLET | Refills: 3 | Status: SHIPPED | OUTPATIENT
Start: 2024-08-02

## 2024-08-01 RX ORDER — INSULIN GLARGINE 100 [IU]/ML
18 INJECTION, SOLUTION SUBCUTANEOUS DAILY
Qty: 10 ML | Refills: 3 | Status: SHIPPED | OUTPATIENT
Start: 2024-08-02

## 2024-08-01 RX ADMIN — CARVEDILOL 25 MG: 25 TABLET, FILM COATED ORAL at 09:35

## 2024-08-01 RX ADMIN — Medication 1000 MG: at 01:45

## 2024-08-01 RX ADMIN — HEPARIN SODIUM 5000 UNITS: 5000 INJECTION INTRAVENOUS; SUBCUTANEOUS at 05:28

## 2024-08-01 RX ADMIN — CLOPIDOGREL BISULFATE 75 MG: 75 TABLET ORAL at 09:35

## 2024-08-01 RX ADMIN — INSULIN GLARGINE 18 UNITS: 100 INJECTION, SOLUTION SUBCUTANEOUS at 09:00

## 2024-08-01 RX ADMIN — CARVEDILOL 25 MG: 25 TABLET, FILM COATED ORAL at 18:00

## 2024-08-01 RX ADMIN — HYDRALAZINE HYDROCHLORIDE 10 MG: 20 INJECTION, SOLUTION INTRAMUSCULAR; INTRAVENOUS at 05:28

## 2024-08-01 RX ADMIN — ASPIRIN 81 MG: 81 TABLET, COATED ORAL at 09:35

## 2024-08-01 RX ADMIN — AZITHROMYCIN DIHYDRATE 500 MG: 500 INJECTION, POWDER, LYOPHILIZED, FOR SOLUTION INTRAVENOUS at 12:10

## 2024-08-01 RX ADMIN — HEPARIN SODIUM 5000 UNITS: 5000 INJECTION INTRAVENOUS; SUBCUTANEOUS at 14:03

## 2024-08-01 NOTE — DISCHARGE INSTRUCTIONS
-You were admitted for worsening of aphasia and right-sided weakness.  -CT scan of the head and CTA head and neck without contrast was negative for any acute abnormality  -MRI of the brain was done which did not show any acute abnormality as well  -You did not receive any TNK due to not known last well-known time.  -He was started on your home medications.  You were started on aspirin and Plavix.  -You are found to be hyperglycemic and hypertensive.  Internal medicine was consulted who started you on Lantus and lispro as per sliding scale.  Your high blood pressure was managed with lisinopril 20 mg and Coreg 25 mg twice daily.    -Continue taking aspirin, Plavix and Lipitor  -Continue taking lisinopril 20 mg daily and Coreg 25 mg twice daily for hypertension  -Continue taking insulin Lantus 18 units nightly along with metformin 1000 mg twice daily.  Please follow-up with your PCP for adjustment of antibiotic medications.    -Follow-up with your PCP within 1 week of discharge  -Follow-up with your neurologist within 1 to 4 weeks of discharge      -Please call 911 or come to the ED in case you experience worsening of the symptoms.

## 2024-08-01 NOTE — PLAN OF CARE
Problem: Discharge Planning  Goal: Discharge to home or other facility with appropriate resources  8/1/2024 0639 by Christina Salcido RN  Outcome: Progressing  Flowsheets (Taken 8/1/2024 0639)  Discharge to home or other facility with appropriate resources:   Identify barriers to discharge with patient and caregiver   Identify discharge learning needs (meds, wound care, etc)     Problem: Safety - Adult  Goal: Free from fall injury  8/1/2024 0639 by Christina Salcido RN  Outcome: Progressing  Flowsheets (Taken 8/1/2024 0639)  Free From Fall Injury: Instruct family/caregiver on patient safety     Problem: Chronic Conditions and Co-morbidities  Goal: Patient's chronic conditions and co-morbidity symptoms are monitored and maintained or improved  8/1/2024 0639 by Christina Salcido RN  Outcome: Progressing  Flowsheets (Taken 8/1/2024 0639)  Care Plan - Patient's Chronic Conditions and Co-Morbidity Symptoms are Monitored and Maintained or Improved:   Monitor and assess patient's chronic conditions and comorbid symptoms for stability, deterioration, or improvement   Collaborate with multidisciplinary team to address chronic and comorbid conditions and prevent exacerbation or deterioration   Update acute care plan with appropriate goals if chronic or comorbid symptoms are exacerbated and prevent overall improvement and discharge

## 2024-08-01 NOTE — PROGRESS NOTES
/SLP ALL NOTES  Facility/Department: [address] STEPDOWN  Initial Speech/Language/Cognitive Assessment    NAME: Moy Mckinney  : 1965   MRN: [id number]  ADMISSION DATE: 2024  ADMITTING DIAGNOSIS: has Aphasia; Type 2 diabetes mellitus with hyperglycemia, without long-term current use of insulin (HCC); Pneumonia of right lower lobe due to infectious organism; Cystitis; Sepsis without acute organ dysfunction (HCC); Basilar artery occlusion; and History of ischemic middle cerebral artery stroke on their problem list.    Date of Eval: 2024   Evaluating Therapist: CONOR HEIN    //RECENT RESULTS  CT OF HEAD/MRI: 2024  FINDINGS:  No abnormal restricted diffusion to suggest an acute infarct.  No suspicious extra-axial fluid.  No midline shift.  There is mild ventriculomegaly which probably relates to cortical volume loss.  There is a mild amount of nonspecific periventricular and subcortical white  matter T2 prolongation which is most commonly attributed to small vessel  ischemic disease.  IMPRESSION:  *No acute intracranial findings.  *Please see above for more details.    Primary Complaint: The patient is a 58 y.o. female With no medical problem on chart, per signout , patient had ischemic stroke before, not know what medication she is on, but with residual aphasia and right upper extremities weakness presented due to aphasia, per sister, patient living at home, she called her at 3:30 PM, noticed patient was unable to express word, last known well unknown, patient was found by EMS as unable to give a history, she was only able to say yes or no question, per EMS patient was found tachycardia but no medication was given, blood glucose on presentation was 359, lactic acid 4.3.  Blood pressure 149, NIH 3 for level of consciousness and moderate aphasia, TNK was not given due to unknown last known well, CT head did not show any acute abnormality, CTA did show basilar artery occlusion,  With no acute 
Added scheduled metoprolol for elevated BP &HR, will increase fluids to 100ml/hr as clinically dry and last EF preserved, strict I&Os. Cr 1.1, baseline 0.8 normal. Has chronic foleys, on rocephin as previous cultures nearly pan sensitive. Will confirm if exchanged in ED, not listed in LDA    Was meeting sirs on admission with UTI as suspected source.  Consulted medicine for med mgmt - HTN, DM, UTI, HFpEF. Greatly appreciate recs   
[facility] Neurology   IN-PATIENT SERVICE   [facility]    Progress Note             Date:   7/31/2024  Patient name:  Moy Mckinney  Date of admission:  7/29/2024  4:27 PM  MRN:   [id number]  Account:  [id number]  YOB: 1965  PCP:    No primary care provider on file.  Room:   [address] [place] [address]  Code Status:    Full Code    Chief Complaint:     Chief Complaint   Patient presents with    Altered Mental Status     Found at [facility]       Interval hx:     The patient was seen and examined at bedside. Is vitally stable, alert and oriented x 4. No acute events overnight.  The patient stated that she slept okay last night.  He denies any headache, dizziness or changes in vision.  As per patient, right extremity strength is coming back.  Patient has chronic indwelling Medina catheter for her urinary retention.  She is continues to be on azithromycin and Rocephin for pneumonia and UTI.  Discussed the patient with RN, no other acute issues noted.      Brief History of Present Illness:     The patient is a 58 y.o. female With no medical problem on chart, per signout , patient had ischemic stroke before, not know what medication she is on, but with residual aphasia and right upper extremities weakness presented due to aphasia, per sister, patient living at home, she called her at 3:30 PM, noticed patient was unable to express word, last known well unknown, patient was found by EMS as unable to give a history, she was only able to say yes or no question, per EMS patient was found tachycardia but no medication was given, blood glucose on presentation was 359, lactic acid 4.3.  Blood pressure 149, NIH 3 for level of consciousness and moderate aphasia, TNK was not given due to unknown last known well, CT head did not show any acute abnormality, CTA did show basilar artery occlusion,  With no acute intracranial finding with MRI limited brain        Past Medical History:     History 
Occupational Therapy  Facility/Department: [facility] 1C STEPDOWN   Daily Treatment Note  Patient Name: Moy Mckinney        MRN: [id number]    : 1965    Date of Service: 2024    Discharge Recommendations  Discharge Recommendations: Patient would benefit from continued therapy after discharge    OT Equipment Recommendations  Equipment Needed: Yes  ADL Assistive Devices: Shower Chair with back;Grab Bars - shower;Grab Bars - toilet    Assessment  Performance deficits / Impairments: Decreased functional mobility ;Decreased ADL status;Decreased endurance;Decreased high-level IADLs;Decreased balance;Decreased cognition  Assessment: Pt would benefit from continued skilled OT to address above deficits to increase independence with ADL/IADLs and to promote overall occupational performance.   Prognosis: Good  REQUIRES OT FOLLOW-UP: Yes  Activity Tolerance  Activity Tolerance: Patient Tolerated treatment well  Safety Devices  Type of Devices: All fall risk precautions in place;Bed alarm in place;Patient at risk for falls;Nurse notified;Left in bed;Call light within reach  Restraints  Restraints Initially in Place: No    Restrictions/Precautions  Restrictions/Precautions  Restrictions/Precautions: Fall Risk;General Precautions;Bed Alarm;Up as Tolerated  Required Braces or Orthoses?: No  Position Activity Restriction  Other position/activity restrictions: Up with assist, purewick, impulsive    Subjective  General  Patient assessed for rehabilitation services?: Yes  Response to previous treatment: Patient with no complaints from previous session  Family / Caregiver Present: No  General Comment  Comments: Nursing OK'd for OT tx this date. Pt agreeable to therapy and pleasant/cooperative throughout. Pt denied pain throughout session this date, however requested pain medication at conclusion of tx. Nursing notified.    Objective  Orientation  Overall Orientation Status: Within Functional Limits  Orientation Level: Oriented to 
Physical Therapy  Facility/Department: [address] STEPDOWN  Physical Therapy Initial Assessment    Name: Moy Mckinney  : 1965  MRN: [id number]  Date of Service: 2024    Per EMR:    The patient is a 58 y.o. female  H/O ischemic stroke before, unknown medication  residual aphasia and right upper extremities weakness presented due to aphasia, per sister, patient living at home, she called her at 3:30 PM, noticed patient was unable to express word, last known well unknown, patient was found by EMS as unable to give a history, she was only able to say yes or no question, per EMS patient was found tachycardia but no medication was given, blood glucose on presentation was 359, lactic acid 4.3.  Blood pressure 149, NIH 3 for level of consciousness and moderate aphasia, TNK was not given due to unknown last known well, CT head did not show any acute abnormality, CTA did show basilar artery occlusion,  With no acute intracranial finding with MRI limited brain.      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   This patient may benefit from a Physical Medicine and Rehab consult.        Patient Diagnosis(es): There were no encounter diagnoses.  Past Medical History:  has no past medical history on file.  Past Surgical History:  has no past surgical history on file.    Assessment   Body Structures, Functions, Activity Limitations Requiring Skilled Therapeutic Intervention: Decreased functional mobility ;Decreased tolerance to work activity;Decreased strength;Decreased endurance;Decreased balance;Increased pain  Assessment: Pt ambulater ~75 ft CGA no AD, sit <> stand CGA, 
[facility]  Office: [phone]  Geovanny Quintero DO, Chuy Hathaway DO, Todd Smith DO, Johny Everett DO, Ivan rByant MD, Tram Alejo MD, Gino Bryant MD, Tonia Schulte MD,  Pb Tejeda MD, Kar Alvarado MD, Tim Cornejo MD,  Cherelle David DO, Alea Salinas MD, Kelvin Henry MD, Edgardo Quintero DO, Nery Diaz MD,  Alessandro Lawton DO, Becky Agosto MD, Khalida Portillo MD, Tejal Hodge MD, Carmencita Scherer MD,  Noah Zepeda MD, Peewee Pryor MD, Driss Gardner MD, Luis Georges MD, Mario Oropeza MD, Richie Orlando MD, Nikita Spears DO, Gera Quesada DO, Christel Chiu MD,  Florentino Toscano MD, Shirley Waterhouse, CNP,  Idalia Chavis CNP, Marky Gill, CNP,  Krissy Farnsworth, DNP, Kiah Durant, CNP, Arianna Carranza, CNP, Mattie Briggs CNP, Alma Jaiems CNP, America Little, PA-C, Mirian Hampton PA-C, Hallie Woodard, CNP, Kisha Culver, CNP, Emerita Daniel, CNP, Tina Leyva, CNP, Angela Pepper CNP, Amalia Garcia, CNS, Clarice Martin, CNP, Stephany Lee CNP, Tracy Schwab, CNP         IN-PATIENT SERVICE  [facility]    Progress Note    7/31/2024    10:05 AM    Name:   Moy Mckinney  MRN:     [id number]     Acct:      [id number]   Room:   0143/0143-01   Day:  2  Admit Date:  7/29/2024  4:27 PM    PCP:   No primary care provider on file.  Code Status:  Full Code    Subjective:     C/C:   Chief Complaint   Patient presents with    Altered Mental Status     Found at [facility]     Interval History Status:   Comfortable  No distress  Still aphasic  Unable to provide any reliable historical data    Data Base Updates:  BP improved    Qrnotz979molv/L Potassium4.2mmol/L Iwmbbept953ivmj/L  Low mmol/L   Anion Hay09bage/L     Blood sugars improved but still not at goal  Hrebvhb278 High mg/dL     BUN7mg/dL Creatinine0.8    WBC10.7k/uL RBC4.27m/uL Aouzjshwmq58.5 Low     Specimen Description.BLOOD Special RequestsR HAND 2ML 
Pt asked RN to place key fob in locked place for safe keeping. Key fob locked in pt's med bin.  
Pt discharged home,left unit walking with family. Discharged instructions given to patient and family member,understanding expressed via feedback.Fair well wishes expressed to patient upon departure.  
SLP ALL NOTES  Facility/Department: [address] STEPDOWN   CLINICAL BEDSIDE SWALLOW EVALUATION    NAME: Moy Mckinney  : 1965  MRN: [id number]    ADMISSION DATE: 2024  ADMITTING DIAGNOSIS: has Aphasia; Type 2 diabetes mellitus with hyperglycemia, without long-term current use of insulin (HCC); Pneumonia of right lower lobe due to infectious organism; Cystitis; Sepsis without acute organ dysfunction (HCC); Basilar artery occlusion; and History of ischemic middle cerebral artery stroke on their problem list.    Recent Chest Xray/CT of Chest:   IMPRESSION:  No definite acute finding, as discussed above.  There is strong clinical  concern for developing infiltrate at the right base, short-term follow-up  chest x-ray suggested.       Date of Eval: 2024  Evaluating Therapist: JEMAL Yuan    Current Diet level:   Regular with thin    Primary Complaint   The patient is a 58 y.o. female With no medical problem on chart, per signout , patient had ischemic stroke before, not know what medication she is on, but with residual aphasia and right upper extremities weakness presented due to aphasia, per sister, patient living at home, she called her at 3:30 PM, noticed patient was unable to express word, last known well unknown, patient was found by EMS as unable to give a history, she was only able to say yes or no question, per EMS patient was found tachycardia but no medication was given, blood glucose on presentation was 359, lactic acid 4.3.  Blood pressure 149, NIH 3 for level of consciousness and moderate aphasia, TNK was not given due to unknown last known well, CT head did not show any acute abnormality, CTA did show basilar artery occlusion,  With no acute intracranial finding with MRI limited brain.    Pain:  Pain Assessment  Pain Assessment: None - Denies Pain    Reason for Referral  Moy Mckinney was referred for a bedside swallow evaluation to assess the efficiency of her swallow function, 
SLP ALL NOTES  [facility]  Speech Language Pathology    Date: 8/1/2024  Patient Name: Moy Mckinney  YOB: 1965   AGE: 58 y.o.  MRN: [id number]        Patient Not Available for Speech Therapy     Due to:  [] Testing  [] Hemodialysis  [] Cancelled by RN  [] Surgery   [] Intubation/Sedation/Pain Medication  [] Medical instability  [x] Other:pt with PT at time of attempt.     Next scheduled treatment: 8/2/2024 or as able    Completed by: Joselin Head  Clinician  Cosigned By: Urszula Caban M.S.CCC/SLP   
SPIRITUAL HEALTH  [facility]  Emergency/Trauma Note    PATIENT NAME: Sim Joyce     Shift date: 7.29.2024  Shift day: Monday   Shift # 2    Room # 16/16   Name: Sim Joyce            Age: 58 y.o.  Gender: female          Language: English   Anglican: None   Principal Problem: Stroke of unknown etiology (HCC)     Trauma/Incident type: Stroke Alert    Admit Date & Time: 7/29/2024  4:27 PM  TRAUMA NAME: NANDA    ADVANCE DIRECTIVES IN CHART?  No    NAME OF DECISION MAKER: None    RELATIONSHIP OF DECISION MAKER TO PATIENT: None     EMERGENCY CONTACTS IN CHART:  Extended Emergency Contact Information  Primary Emergency Contact: Jayne Hudson  Mobile Phone: [phone]  Relation: Brother/Sister       PATIENT/EVENT DESCRIPTION:  Sim Joyce is a 58 y.o. female who arrived via EMT  from Scene of the Accident  as a Stroke Alert  due to Stroke-like issues. Pt to be admitted to 16/16.         SPIRITUAL ASSESSMENT:  Patient Assessment:  Patient appears to show no distress at this time appearing Calm and Coping.  Perceived Needs Assessment:  Build a relationship of care and support, Receive care and concern, Establish rapport and connectedness, Receive comfort, and Recieve hospitality.    Relational Resources appear average..      Emotional Resources appear absent. .     Spiritual Resources appear average.     Support Assessment:   No family bedside at this time.        INTERVENTION:   Provided space for feelings, thoughts, and concerns, Actively listened, Listened empathetically, Acknowledged current situation, Acknowledged difficult experiences, and Cultivated a relationship of care and support.      OUTCOME:  Built relationships of care and support, Demonstrated caring concern, and Demonstrated kindness and compassion with a congruent response given the current situation and/or circumstances.        PATIENT BELONGINGS:  No belongings noted    ANY BELONGINGS OF SIGNIFICANT 
Plan  Times Per Week: 3-4x/wk  Specific Instructions for Next Treatment: Propriceptive functional interventions  Current Treatment Recommendations: Balance training, Functional mobility training, Endurance training, Safety education & training, Patient/Caregiver education & training, Self-Care / ADL, Equipment evaluation, education, & procurement, Positioning, Home management training, Cognitive/Perceptual training     Restrictions  Restrictions/Precautions  Restrictions/Precautions: Up as Tolerated  Required Braces or Orthoses?: No  Position Activity Restriction  Other position/activity restrictions: Hx of CVA w/ aphasia    Subjective   General  Patient assessed for rehabilitation services?: Yes  Family / Caregiver Present: No  General Comment  Comments: RN ok'd for OT/PT eval this AM. Pt agreeable to session, pleasent/cooperative throughout. Pt denies any pain.     Social/Functional History  Social/Functional History  Lives With: Alone  Type of Home: Apartment  Home Layout: One level (7th floor. Laundry is on first floor.)  Home Access: Elevator  Bathroom Shower/Tub: Tub/Shower unit  Bathroom Toilet: Standard  Bathroom Equipment: Grab bars in shower  Home Equipment: None (Does not use AD at baseline)  Has the patient had two or more falls in the past year or any fall with injury in the past year?: No  ADL Assistance: Independent  Homemaking Assistance: Independent  Homemaking Responsibilities: Yes  Meal Prep Responsibility: Primary  Laundry Responsibility: Primary  Bill Paying/Finance Responsibility: Primary  Shopping Responsibility: Primary  Ambulation Assistance: Independent  Transfer Assistance: Independent  Active : No  Patient's  Info: Sister  Occupation: On disability  Leisure & Hobbies: playing cards  Additional Comments: Would not have 24/7 A but could have assist from sister if needed.       Objective   Safety Devices  Type of Devices: Call light within reach;Left in bed;Bed alarm in 
Individual Concurrent Group Co-treatment   Time In 1412         Time Out 1500         Minutes 48         Timed Code Treatment Minutes: 48 Minutes       Estela Nielson, PT, DPT         
intracranial findings. *Please see above for more details.     CT CHEST PULMONARY EMBOLISM W CONTRAST    Result Date: 7/29/2024  1.  No acute pulmonary embolism. 2. No acute pulmonary disease.     CTA HEAD NECK W CONTRAST    Addendum Date: 7/29/2024    ADDENDUM: Case was discussed with Dr. Ye at 6:13 p.m.     Result Date: 7/29/2024  Basilar artery occlusion. RECOMMENDATIONS: The findings were sent to the Radiology Results Communication Center at 6:09 pm on 7/29/2024 to be communicated to a licensed caregiver.     CT HEAD WO CONTRAST    Result Date: 7/29/2024  No acute intracranial abnormality. RECOMMENDATIONS: The findings were sent to the Radiology Results Communication Center at 5:33 pm on 7/29/2024 to be communicated to a licensed caregiver.       Assessment:        Primary Problem  Stroke-like symptoms    Active Hospital Problems    Diagnosis Date Noted    Hypertension not at goal [I10] 07/31/2024    Stroke-like symptoms [R29.90] 07/31/2024    Aphasia [R47.01] 07/30/2024    Type 2 diabetes mellitus with hyperglycemia, without long-term current use of insulin (HCC) [E11.65] 07/30/2024    Pneumonia of right lower lobe due to infectious organism [J18.9] 07/30/2024    Cystitis [N30.90] 07/30/2024    Sepsis (HCC) [A41.9] 07/30/2024    Basilar artery occlusion [I65.1] 07/30/2024    Hx of arterial ischemic stroke [Z86.73] 07/30/2024    Intracranial atherosclerosis [I67.2] 07/30/2024    Hx of medication noncompliance [Z91.148] 07/30/2024         Plan:        Neurology evaluation continues  Endovascular eval noted: No intervention planned  Aspirin  Lipitor  Plavix  PT/OT/ST  Glycemic contol - Monitor and control blood sugars  Lantus titrated to 18 units  Blood Pressure - Monitor and control  Lisinopril increased to 20 mg daily  Antibiotics per C&S Results (negative so far)  Encourage compliance  DVT prophylaxis: On heparin  Discharge planning in progress  Anticipate discharge when arrangements complete and ok 
ordered  -POCT x4     #Hypertensive Urgency  -Blood pressure 180/123 on presentation  -Medicine consulted for HTN management  -Currently on Coreg 12.5 mg BID and Lisinopril 10 mg. Hydralazine 10 mg q4H PRN and IV Labetalol 10 mg q4H PRN     DVT prophylaxis: Heparin 5000 x 3  GI prophylaxis: Not indicated  Diet: N.p.o., start regular diet if she passed bedside swallow test  Disposition: To be decided     OT/PT     Code Status: Full code      Follow-up further recommendations after discussing case with the attending.  The plan was discussed with the patient, patient's family and the medical staff.   Consultations:   IP CONSULT TO ENDOVASCULAR NEUROSURGERY  IP CONSULT TO CARDIOLOGY  IP CONSULT TO INTERNAL MEDICINE  IP CONSULT TO PHYSICAL MEDICINE REHAB  IP CONSULT TO HOME CARE NEEDS    Patient is admitted as inpatient status because of co-morbidities listed above, severity of signs and symptoms as outlined, requirement for current medical therapies and most importantly because of direct risk to patient if care not provided in a hospital setting.    Radha Perez MD  Internal Medicine Resident PGY2  Neurology Service  8/1/2024  7:42 AM    Copy sent to Dr. Mason primary care provider on file.

## 2024-08-03 LAB — SERVICE CMNT-IMP: NORMAL

## 2024-08-03 NOTE — DISCHARGE SUMMARY
glargine (LANTUS) 100 UNIT/ML injection vial Inject 18 Units into the skin daily, Disp-10 mL, R-3Normal      clopidogrel (PLAVIX) 75 MG tablet Take 1 tablet by mouth daily, Disp-30 tablet, R-3Normal      glucose 4 g chewable tablet Take 4 tablets by mouth as needed for Low blood sugar, Disp-60 tablet, R-3DC to SNF             Activity: activity as tolerated    Restrictions: Driving restriction No, Swimming restriction No, Operating heavy machinery restriction Yes, Compromising heights restriction Yes    Diet: regular diet    Follow-up:    Farheen Aaron, DO  [address] [facility] [place] [address]  [phone]    Schedule an appointment as soon as possible for a visit in 1 month(s)      Iona Arroyo, APRN - CNP  [address] [facility] [address]  [phone]    Go in 1 week(s)        Follow up labs: None    Follow up imaging: None    Note that over 30 minutes was spent in preparing discharge papers, discussing discharge with patient, medication review, etc.      Radha Perez MD,  Internal Medicine Resident PGY-2  Neurology Service  [place], [place]  8/2/2024, 9:46 PM

## 2024-08-06 ENCOUNTER — TELEPHONE (OUTPATIENT)
Dept: UROLOGY | Age: 59
End: 2024-08-06

## 2024-08-06 NOTE — TELEPHONE ENCOUNTER
Patient left a voicemail that she has questions about up coming appt on 8/9/24 she did not specify what her questions was. She can be reached at [phone].

## 2024-08-09 ENCOUNTER — APPOINTMENT (OUTPATIENT)
Dept: GENERAL RADIOLOGY | Age: 59
End: 2024-08-09
Payer: COMMERCIAL

## 2024-08-09 ENCOUNTER — HOSPITAL ENCOUNTER (EMERGENCY)
Age: 59
Discharge: HOME | End: 2024-08-09
Attending: EMERGENCY MEDICINE
Payer: COMMERCIAL

## 2024-08-09 VITALS
TEMPERATURE: 98 F | SYSTOLIC BLOOD PRESSURE: 120 MMHG | DIASTOLIC BLOOD PRESSURE: 87 MMHG | RESPIRATION RATE: 20 BRPM | OXYGEN SATURATION: 96 % | HEART RATE: 96 BPM

## 2024-08-09 DIAGNOSIS — M79.672 LEFT FOOT PAIN: Primary | ICD-10-CM

## 2024-08-09 LAB
ANION GAP SERPL CALCULATED.3IONS-SCNC: 15 MMOL/L (ref 9–16)
BASOPHILS # BLD: 0.09 K/UL (ref 0–0.2)
BASOPHILS NFR BLD: 1 % (ref 0–2)
BILIRUB UR QL STRIP: NEGATIVE
BUN SERPL-MCNC: 19 MG/DL (ref 8–23)
CALCIUM SERPL-MCNC: 9.7 MG/DL (ref 8.6–10.4)
CHLORIDE SERPL-SCNC: 102 MMOL/L (ref 98–107)
CLARITY UR: CLEAR
CO2 SERPL-SCNC: 16 MMOL/L (ref 20–31)
COLOR UR: YELLOW
COMMENT: NORMAL
CREAT SERPL-MCNC: 1 MG/DL (ref 0.5–0.9)
EOSINOPHIL # BLD: 0.45 K/UL (ref 0–0.44)
EOSINOPHILS RELATIVE PERCENT: 4 % (ref 1–4)
ERYTHROCYTE [DISTWIDTH] IN BLOOD BY AUTOMATED COUNT: 13.7 % (ref 11.8–14.4)
GFR, ESTIMATED: 65 ML/MIN/1.73M2
GLUCOSE SERPL-MCNC: 149 MG/DL (ref 74–99)
GLUCOSE UR STRIP-MCNC: NEGATIVE MG/DL
HCT VFR BLD AUTO: 39.7 % (ref 36.3–47.1)
HGB BLD-MCNC: 12.1 G/DL (ref 11.9–15.1)
HGB UR QL STRIP.AUTO: NEGATIVE
IMM GRANULOCYTES # BLD AUTO: 0.03 K/UL (ref 0–0.3)
IMM GRANULOCYTES NFR BLD: 0 %
KETONES UR STRIP-MCNC: NEGATIVE MG/DL
LEUKOCYTE ESTERASE UR QL STRIP: NEGATIVE
LYMPHOCYTES NFR BLD: 3.81 K/UL (ref 1.1–3.7)
LYMPHOCYTES RELATIVE PERCENT: 33 % (ref 24–43)
MCH RBC QN AUTO: 26.9 PG (ref 25.2–33.5)
MCHC RBC AUTO-ENTMCNC: 30.5 G/DL (ref 28.4–34.8)
MCV RBC AUTO: 88.4 FL (ref 82.6–102.9)
MONOCYTES NFR BLD: 1.1 K/UL (ref 0.1–1.2)
MONOCYTES NFR BLD: 10 % (ref 3–12)
NEUTROPHILS NFR BLD: 52 % (ref 36–65)
NEUTS SEG NFR BLD: 6.16 K/UL (ref 1.5–8.1)
NITRITE UR QL STRIP: NEGATIVE
NRBC BLD-RTO: 0 PER 100 WBC
PH UR STRIP: 5.5 [PH] (ref 5–8)
PLATELET # BLD AUTO: 390 K/UL (ref 138–453)
PMV BLD AUTO: 9.9 FL (ref 8.1–13.5)
POTASSIUM SERPL-SCNC: 5.4 MMOL/L (ref 3.7–5.3)
PROT UR STRIP-MCNC: NEGATIVE MG/DL
RBC # BLD AUTO: 4.49 M/UL (ref 3.95–5.11)
SODIUM SERPL-SCNC: 133 MMOL/L (ref 136–145)
SP GR UR STRIP: 1.01 (ref 1–1.03)
UROBILINOGEN UR STRIP-ACNC: NORMAL EU/DL (ref 0–1)
WBC OTHER # BLD: 11.6 K/UL (ref 3.5–11.3)

## 2024-08-09 PROCEDURE — 73630 X-RAY EXAM OF FOOT: CPT

## 2024-08-09 PROCEDURE — 99284 EMERGENCY DEPT VISIT MOD MDM: CPT

## 2024-08-09 PROCEDURE — 6360000002 HC RX W HCPCS

## 2024-08-09 PROCEDURE — 85025 COMPLETE CBC W/AUTO DIFF WBC: CPT

## 2024-08-09 PROCEDURE — 96372 THER/PROPH/DIAG INJ SC/IM: CPT

## 2024-08-09 PROCEDURE — 6370000000 HC RX 637 (ALT 250 FOR IP)

## 2024-08-09 PROCEDURE — 81003 URINALYSIS AUTO W/O SCOPE: CPT

## 2024-08-09 PROCEDURE — 73610 X-RAY EXAM OF ANKLE: CPT

## 2024-08-09 PROCEDURE — 80048 BASIC METABOLIC PNL TOTAL CA: CPT

## 2024-08-09 RX ORDER — KETOROLAC TROMETHAMINE 30 MG/ML
30 INJECTION, SOLUTION INTRAMUSCULAR; INTRAVENOUS ONCE
Status: COMPLETED | OUTPATIENT
Start: 2024-08-09 | End: 2024-08-09

## 2024-08-09 RX ORDER — KETOROLAC TROMETHAMINE 15 MG/ML
15 INJECTION, SOLUTION INTRAMUSCULAR; INTRAVENOUS ONCE
Status: DISCONTINUED | OUTPATIENT
Start: 2024-08-09 | End: 2024-08-09

## 2024-08-09 RX ORDER — ACETAMINOPHEN 500 MG
1000 TABLET ORAL 3 TIMES DAILY
Qty: 180 TABLET | Refills: 0 | Status: SHIPPED | OUTPATIENT
Start: 2024-08-09

## 2024-08-09 RX ORDER — ACETAMINOPHEN 500 MG
1000 TABLET ORAL ONCE
Status: COMPLETED | OUTPATIENT
Start: 2024-08-09 | End: 2024-08-09

## 2024-08-09 RX ORDER — 0.9 % SODIUM CHLORIDE 0.9 %
1000 INTRAVENOUS SOLUTION INTRAVENOUS ONCE
Status: DISCONTINUED | OUTPATIENT
Start: 2024-08-09 | End: 2024-08-09 | Stop reason: HOSPADM

## 2024-08-09 RX ADMIN — ACETAMINOPHEN 1000 MG: 500 TABLET ORAL at 17:38

## 2024-08-09 RX ADMIN — KETOROLAC TROMETHAMINE 30 MG: 30 INJECTION, SOLUTION INTRAMUSCULAR at 17:38

## 2024-08-09 ASSESSMENT — ENCOUNTER SYMPTOMS
ABDOMINAL PAIN: 0
SHORTNESS OF BREATH: 0

## 2024-08-09 ASSESSMENT — PAIN SCALES - GENERAL
PAINLEVEL_OUTOF10: 7
PAINLEVEL_OUTOF10: 8

## 2024-08-09 ASSESSMENT — PAIN - FUNCTIONAL ASSESSMENT: PAIN_FUNCTIONAL_ASSESSMENT: 0-10

## 2024-08-09 NOTE — ED PROVIDER NOTES
[facility] ED  Emergency Department Encounter  Emergency Medicine Resident     Pt Name:Moy Mckinney  MRN: [id number]  Birthdate 1965  Date of evaluation: 24  PCP:  Iona Arroyo APRN - CNP  Note Started: 4:39 PM EDT      CHIEF COMPLAINT       Chief Complaint   Patient presents with    Foot Pain     L       HISTORY OF PRESENT ILLNESS  (Location/Symptom, Timing/Onset, Context/Setting, Quality, Duration, Modifying Factors, Severity.)      Moy Mckinney is a 59 y.o. female who presents with  left foot and ankle pain following a fall. The patient reports that she tripped and injured her foot when she lost her balance while going to the bathroom. She states, \"I was going to the bathroom and I lost my balance and when I did that I hit the floor and my foot kind of wrapped around the base of the toes.\" The incident occurred last night around 3 or 4 AM. Since the fall, she has been experiencing significant pain across her toes (specifically toes two through five) but not in her great toe. She denies any pain in the back of her leg or her thigh. The patient also denies any head injury, lightheadedness, chest pain, abdominal pain, nausea, or vomiting. She has not taken any medication for pain since the incident and has been \"suffering with this pain\" and \"crying.\" The patient has a history of being on blood thinners, specifically Plavix and aspirin, but denies being on Warfarin, Coumadin, Xarelto, or Eliquis. She also reports an allergy to Motrin, which causes her eyes to swell.    PAST MEDICAL / SURGICAL / SOCIAL / FAMILY HISTORY      has a past medical history of Anxiety, Chronic back pain, Chronic kidney disease, Hypertension, Kidney stone, Mitral valve prolapse, and Stroke (cerebrum) (HCC).       has a past surgical history that includes  section; Hysterectomy (); and Tonsillectomy.      Social History     Socioeconomic History    Marital status: Single     Spouse name: Not  Year: No     Number of Places Lived in the Last Year: 1     Unstable Housing in the Last Year: No       Family History   Problem Relation Age of Onset    Heart Disease Mother        Allergies:  Motrin [ibuprofen micronized] and Motrin [ibuprofen]    Home Medications:  Prior to Admission medications    Medication Sig Start Date End Date Taking? Authorizing Provider   acetaminophen (TYLENOL) 500 MG tablet Take 2 tablets by mouth 3 times daily 8/9/24  Yes Evens Ye DO   aspirin 81 MG EC tablet Take 1 tablet by mouth daily 8/2/24   Radha Perez MD   atorvastatin (LIPITOR) 80 MG tablet Take 1 tablet by mouth nightly 8/1/24   Radha Perez MD   carvedilol (COREG) 25 MG tablet Take 1 tablet by mouth 2 times daily (with meals) 8/1/24   Radha Perez MD   lisinopril (PRINIVIL;ZESTRIL) 20 MG tablet Take 1 tablet by mouth daily 8/2/24   Radha Perez MD   insulin glargine (LANTUS) 100 UNIT/ML injection vial Inject 18 Units into the skin daily 8/2/24   Radha Perez MD   clopidogrel (PLAVIX) 75 MG tablet Take 1 tablet by mouth daily 8/2/24   Radha Perez MD   glucose 4 g chewable tablet Take 4 tablets by mouth as needed for Low blood sugar 8/1/24   Radha Perez MD   magnesium oxide (MAG-OX) 400 (240 Mg) MG tablet Take 1 tablet by mouth daily 11/17/23   Josr Ramachandran MD   potassium chloride (KLOR-CON M) 20 MEQ extended release tablet Take 1 tablet by mouth 2 times daily (with meals) 9/29/23   Josr Ramachandran MD   atorvastatin (LIPITOR) 40 MG tablet Take 1 tablet by mouth nightly 4/20/23   Florentino Toscano MD   aspirin 81 MG chewable tablet Take 1 tablet by mouth daily 4/21/23   Florentino Toscano MD   clopidogrel (PLAVIX) 75 MG tablet Take 1 tablet by mouth daily 4/21/23   Florentino Toscano MD   Blood Pressure KIT 1 kit by Does not apply route 2 times daily as needed (Before metoprolol administration) 4/12/23   Krissy Farnsworth APRN - NP   metFORMIN (GLUCOPHAGE) 1000 MG tablet Take 1

## 2024-08-09 NOTE — ED NOTES
Pt reports to the ED with complaints of L foot pain. Pt states around 0400 this morning, she was walking to the bathroom when she hit her foot on the toilet. Pt denies hitting her head or LOC. PMS is intact at this time. Pt states she was recently discharged from the hospital with a stroke, pt denies any new stroke symptoms at this time. Upon assessment, neuro status is intact at this time, facial expressions are symmetrical. Pt states she is compliant with her meds. Pt is A&O x4 and speaking in complete sentences. Pt is resting in bed comfortably, NAD noted. Pt denies any other symptoms at this time including chest pain or SOB. Care ongoing

## 2024-08-09 NOTE — DISCHARGE INSTRUCTIONS
You are seen in the emergency department for your left foot injury.  You are given an Ace bandage as well as pain control.  Please take the medications as prescribed.    PLEASE RETURN TO THE EMERGENCY DEPARTMENT IMMEDIATELY for worsening symptoms, pain not controlled with the prescribed / over the counter pain medication, numbness or tingling in your feet or toes, increased swelling to your toes, or if you develop any concerning symptoms such as: high fever not relieved by acetaminophen (Tylenol) and/or ibuprofen (Motrin / Advil), chills, shortness of breath, chest pain, feeling of your heart fluttering or racing, persistent nausea and/or vomiting, vomiting up blood, blood in your stool, numbness, loss of consciousness, weakness or tingling in the arms or legs or change in color of the extremities, changes in mental status, persistent headache, blurry vision, loss of bladder / bowel control, unable to follow up with your physician, or other any other care or concern.

## 2024-08-09 NOTE — ED PROVIDER NOTES
[facility] ED     Emergency Department     Faculty Attestation        I performed a history and physical examination of the patient and discussed management with the resident. I reviewed the resident’s note and agree with the documented findings and plan of care. Any areas of disagreement are noted on the chart. I was personally present for the key portions of any procedures. I have documented in the chart those procedures where I was not present during the key portions. I have reviewed the emergency nurses triage note. I agree with the chief complaint, past medical history, past surgical history, allergies, medications, social and family history as documented unless otherwise noted below.  For Physician Assistant/ Nurse Practitioner cases/documentation I have personally evaluated this patient and have completed at least one if not all key elements of the E/M (history, physical exam, and MDM). Additional findings are as noted.      Vital Signs: BP: 120/87  Pulse: 96  Respirations: 20  Temp: 98 °F (36.7 °C) SpO2: 96 %  PCP:  Iona Arroyo, ASHOK - CNP  Note Started: 8/9/24, 5:23 PM EDT    Pertinent Comments:         Critical Care  None      (Please note that portions of this note were completed with a voice recognition program. Efforts were made to edit the dictations but occasionally words are mis-transcribed. Whenever words are used in this note in any gender, they shall be construed as though they were used in the gender appropriate to the circumstances; and whenever words are used in this note in the singular or plural form, they shall be construed as though they were used in the form appropriate to the circumstances.)    Marlo Powers MD [facility]  Attending Emergency Medicine Physician           Marlo Powers MD  08/09/24 [id number]

## 2024-10-03 NOTE — PROGRESS NOTES
Patient instructed to remove shoes and socks and instructed to sit in exam chair.  Current PCP is Iona Arroyo APRN - CNP and date of last visit was unknown.   Do you have a follow up visit scheduled?  No  If yes, the date is unknown

## 2024-10-09 ENCOUNTER — OFFICE VISIT (OUTPATIENT)
Dept: PODIATRY | Age: 59
End: 2024-10-09
Payer: COMMERCIAL

## 2024-10-09 VITALS
HEIGHT: 62 IN | BODY MASS INDEX: 31.83 KG/M2 | DIASTOLIC BLOOD PRESSURE: 97 MMHG | HEART RATE: 97 BPM | WEIGHT: 173 LBS | SYSTOLIC BLOOD PRESSURE: 140 MMHG

## 2024-10-09 DIAGNOSIS — B35.1 PAIN DUE TO ONYCHOMYCOSIS OF NAIL: ICD-10-CM

## 2024-10-09 DIAGNOSIS — B35.1 ONYCHOMYCOSIS: Primary | ICD-10-CM

## 2024-10-09 DIAGNOSIS — M79.609 PAIN DUE TO ONYCHOMYCOSIS OF NAIL: ICD-10-CM

## 2024-10-09 DIAGNOSIS — E11.42 TYPE 2 DIABETES MELLITUS WITH DIABETIC POLYNEUROPATHY, UNSPECIFIED WHETHER LONG TERM INSULIN USE (HCC): ICD-10-CM

## 2024-10-09 PROCEDURE — 1036F TOBACCO NON-USER: CPT

## 2024-10-09 PROCEDURE — 3046F HEMOGLOBIN A1C LEVEL >9.0%: CPT

## 2024-10-09 PROCEDURE — G8427 DOCREV CUR MEDS BY ELIG CLIN: HCPCS

## 2024-10-09 PROCEDURE — 2022F DILAT RTA XM EVC RTNOPTHY: CPT

## 2024-10-09 PROCEDURE — 11721 DEBRIDE NAIL 6 OR MORE: CPT

## 2024-10-09 PROCEDURE — 3017F COLORECTAL CA SCREEN DOC REV: CPT

## 2024-10-09 PROCEDURE — 3077F SYST BP >= 140 MM HG: CPT

## 2024-10-09 PROCEDURE — G8484 FLU IMMUNIZE NO ADMIN: HCPCS

## 2024-10-09 PROCEDURE — 99213 OFFICE O/P EST LOW 20 MIN: CPT

## 2024-10-09 PROCEDURE — 3080F DIAST BP >= 90 MM HG: CPT

## 2024-10-09 PROCEDURE — G8417 CALC BMI ABV UP PARAM F/U: HCPCS

## 2024-10-09 NOTE — PROGRESS NOTES
[facility]  [address]   [address] [place] [address]  Tel: [phone]   Fax: [phone]    Subjective     CC: Diabetic foot exam and painful and elongated toe nails    Interval:   - Recent A1C of 9.9% on 24  - Patient states her toenails are elongated and painful    HPI:  Moy Mckinney is a 59 y.o. year old female who presents to clinic today for diabetic foot examination and also complaining of painful and elongated toenails.  The patient is a diabetic, and they're last HgbA1c was 8.1% in (2023). The patient states their digits are painful when the toenails are elongated, causing rubbing in shoe gear. The patient admits to tingling and numbness in the lower extremities. Patient denies any rest pain or claudication symptoms.The patient denies any history of acute trauma. The patient denies any other pedal complaints.     Primary care physician is Iona Arroyo APRN - CNP.    ROS:    Constitutional: Denies nausea, vomiting, fever, chills.  Neurologic: Admits to numbness, tingling, and burning in the feet.    Vascular: Denies symptoms of lower extremity claudication.    Skin: Denies open wounds.  Otherwise negative except as noted in the HPI.     PMH:  Past Medical History:   Diagnosis Date    Anxiety     Chronic back pain     Chronic kidney disease     Hypertension     Kidney stone     Mitral valve prolapse 1982    Stroke (cerebrum) (HCC)        Surgical History:   Past Surgical History:   Procedure Laterality Date     SECTION      HYSTERECTOMY (CERVIX STATUS UNKNOWN)  2008    TONSILLECTOMY         Social History:  Social History     Tobacco Use    Smoking status: Never    Smokeless tobacco: Never   Substance Use Topics    Alcohol use: Not Currently    Drug use: Never       Medications:  Prior to Admission medications    Medication Sig Start Date End Date Taking? Authorizing Provider   acetaminophen (TYLENOL) 500 MG tablet Take 2 tablets by mouth 3 times

## 2024-11-06 RX ORDER — ASPIRIN 81 MG/1
81 TABLET, COATED ORAL DAILY
Qty: 30 TABLET | Refills: 3 | OUTPATIENT
Start: 2024-11-06

## 2024-12-26 NOTE — PROGRESS NOTES
Patient instructed to remove shoes and socks and instructed to sit in exam chair.  Current PCP is Iona Arroyo APRN - CNP and date of last visit was unknown .   Do you have a follow up visit scheduled?  No  If yes, the date is unknown

## 2025-01-15 ENCOUNTER — OFFICE VISIT (OUTPATIENT)
Dept: PODIATRY | Age: 60
End: 2025-01-15

## 2025-01-15 VITALS
SYSTOLIC BLOOD PRESSURE: 146 MMHG | HEIGHT: 62 IN | WEIGHT: 173 LBS | HEART RATE: 100 BPM | DIASTOLIC BLOOD PRESSURE: 96 MMHG | BODY MASS INDEX: 31.83 KG/M2

## 2025-01-15 DIAGNOSIS — Q66.6 PES PLANOVALGUS: ICD-10-CM

## 2025-01-15 DIAGNOSIS — M79.609 PAIN DUE TO ONYCHOMYCOSIS OF NAIL: ICD-10-CM

## 2025-01-15 DIAGNOSIS — L85.3 XEROSIS CUTIS: ICD-10-CM

## 2025-01-15 DIAGNOSIS — B35.1 PAIN DUE TO ONYCHOMYCOSIS OF NAIL: ICD-10-CM

## 2025-01-15 DIAGNOSIS — G63 POLYNEUROPATHY ASSOCIATED WITH UNDERLYING DISEASE (HCC): ICD-10-CM

## 2025-01-15 DIAGNOSIS — B35.1 ONYCHOMYCOSIS: Primary | ICD-10-CM

## 2025-01-15 DIAGNOSIS — E11.42 TYPE 2 DIABETES MELLITUS WITH DIABETIC POLYNEUROPATHY, UNSPECIFIED WHETHER LONG TERM INSULIN USE (HCC): ICD-10-CM

## 2025-01-15 DIAGNOSIS — I73.9 PVD (PERIPHERAL VASCULAR DISEASE) (HCC): ICD-10-CM

## 2025-03-27 ENCOUNTER — HOSPITAL ENCOUNTER (OUTPATIENT)
Dept: MAMMOGRAPHY | Age: 60
Discharge: HOME | End: 2025-03-29
Payer: COMMERCIAL

## 2025-03-27 DIAGNOSIS — Z12.31 VISIT FOR SCREENING MAMMOGRAM: ICD-10-CM

## 2025-03-27 PROCEDURE — 77063 BREAST TOMOSYNTHESIS BI: CPT

## 2025-04-30 ENCOUNTER — OFFICE VISIT (OUTPATIENT)
Age: 60
End: 2025-04-30
Payer: MEDICAID

## 2025-04-30 VITALS
HEART RATE: 96 BPM | SYSTOLIC BLOOD PRESSURE: 126 MMHG | DIASTOLIC BLOOD PRESSURE: 91 MMHG | BODY MASS INDEX: 30.73 KG/M2 | HEIGHT: 62 IN | WEIGHT: 167 LBS

## 2025-04-30 DIAGNOSIS — E11.51 TYPE II DIABETES MELLITUS WITH PERIPHERAL CIRCULATORY DISORDER (HCC): ICD-10-CM

## 2025-04-30 DIAGNOSIS — B35.1 ONYCHOMYCOSIS: Primary | ICD-10-CM

## 2025-04-30 DIAGNOSIS — B35.1 PAIN DUE TO ONYCHOMYCOSIS OF NAIL: ICD-10-CM

## 2025-04-30 DIAGNOSIS — Q66.6 PES PLANOVALGUS: ICD-10-CM

## 2025-04-30 DIAGNOSIS — I73.9 PVD (PERIPHERAL VASCULAR DISEASE): ICD-10-CM

## 2025-04-30 DIAGNOSIS — M79.609 PAIN DUE TO ONYCHOMYCOSIS OF NAIL: ICD-10-CM

## 2025-04-30 DIAGNOSIS — G63 POLYNEUROPATHY ASSOCIATED WITH UNDERLYING DISEASE: ICD-10-CM

## 2025-04-30 DIAGNOSIS — E11.42 TYPE 2 DIABETES MELLITUS WITH DIABETIC POLYNEUROPATHY, UNSPECIFIED WHETHER LONG TERM INSULIN USE (HCC): ICD-10-CM

## 2025-04-30 PROCEDURE — 3017F COLORECTAL CA SCREEN DOC REV: CPT

## 2025-04-30 PROCEDURE — 11721 DEBRIDE NAIL 6 OR MORE: CPT

## 2025-04-30 PROCEDURE — 3080F DIAST BP >= 90 MM HG: CPT

## 2025-04-30 PROCEDURE — 3074F SYST BP LT 130 MM HG: CPT

## 2025-04-30 PROCEDURE — G8427 DOCREV CUR MEDS BY ELIG CLIN: HCPCS

## 2025-04-30 PROCEDURE — 99212 OFFICE O/P EST SF 10 MIN: CPT

## 2025-04-30 PROCEDURE — 2022F DILAT RTA XM EVC RTNOPTHY: CPT

## 2025-04-30 PROCEDURE — 1036F TOBACCO NON-USER: CPT

## 2025-04-30 PROCEDURE — G8417 CALC BMI ABV UP PARAM F/U: HCPCS

## 2025-04-30 PROCEDURE — 3046F HEMOGLOBIN A1C LEVEL >9.0%: CPT

## 2025-04-30 PROCEDURE — 99213 OFFICE O/P EST LOW 20 MIN: CPT

## 2025-04-30 RX ORDER — UREA 40 %
CREAM (GRAM) TOPICAL
Qty: 120 G | Refills: 2 | Status: SHIPPED | OUTPATIENT
Start: 2025-04-30

## 2025-04-30 NOTE — PROGRESS NOTES
Patient instructed to remove shoes and socks and instructed to sit in exam chair.  Current PCP is Iona Arroyo APRN - CNP and date of last visit was unknown.   Do you have a follow up visit scheduled?  No  If yes, the date is unknown    
([facility])                       Plan   Patient examined and evaluated.  Diagnosis and treatment options discussed in detail.  Nails 1-10 debrided sharply with sterile nail nippers without incident.  Patient was unhappy with the length of her nails at the end of the visit. She stated that she wants them cut shorter. Discussion was had on the importance of not cutting them too short. Patient verbalized understanding, but is still not satisfied with nail length. No further debridement performed as nails were cut to an appropriate length.   Patient was educated on the utmost importance of tight glycemic control.  Patient was advised to continue daily foot checks, in addition to not ambulating barefoot  Patient to RTC in 3 months.  Please, call the office with any questions or concerns.  Rx provided for urea cream to apply daily to the affected feet     Diabetic foot examination performed this visit.  The exam included neurological sensory exam, a 10-g monofilament and pinprick sensation, ankle reflexes, visual skin inspection, vascular exam including assessment of pedal pulses, orthopedic exam for deformities, and shoe inspection.  Increased risk factors noted on the diabetic foot exam include decreased sensory exam and peripheral neuropathy.  Shoegear inspected and found to be appropriate size and wear.       Diabetic Education:    I have reviewed with the patient the do's and don'ts of those with diabetes. This would include avoiding barefoot walking, avoiding the use of over the counter corn removal medications, keeping the blood sugar levels under control, wearing well-fitting shoes to avoid getting sores, inspecting the feet on a daily basis and if any cuts, redness, blisters or swelling are noted to contact the foot and ankle surgeon immediately, do not trim any calluses or blisters on their feet, patient should visit their foot and ankle surgeon on a regular basis for examination of there feet and have periodic